# Patient Record
Sex: FEMALE | Race: WHITE | NOT HISPANIC OR LATINO | Employment: UNEMPLOYED | URBAN - METROPOLITAN AREA
[De-identification: names, ages, dates, MRNs, and addresses within clinical notes are randomized per-mention and may not be internally consistent; named-entity substitution may affect disease eponyms.]

---

## 2019-12-11 ENCOUNTER — TRANSCRIBE ORDERS (OUTPATIENT)
Dept: NUTRITION | Facility: HOSPITAL | Age: 39
End: 2019-12-11

## 2019-12-11 DIAGNOSIS — K58.0 IRRITABLE BOWEL SYNDROME WITH DIARRHEA: Primary | ICD-10-CM

## 2020-01-15 ENCOUNTER — APPOINTMENT (OUTPATIENT)
Dept: NUTRITION | Facility: HOSPITAL | Age: 40
End: 2020-01-15

## 2020-08-04 ENCOUNTER — TELEMEDICINE (OUTPATIENT)
Dept: SLEEP MEDICINE | Facility: HOSPITAL | Age: 40
End: 2020-08-04

## 2020-08-04 DIAGNOSIS — G47.33 OBSTRUCTIVE SLEEP APNEA, ADULT: ICD-10-CM

## 2020-08-04 DIAGNOSIS — E66.01 MORBID (SEVERE) OBESITY DUE TO EXCESS CALORIES (HCC): ICD-10-CM

## 2020-08-04 DIAGNOSIS — R56.9 NOCTURNAL SEIZURES (HCC): ICD-10-CM

## 2020-08-04 DIAGNOSIS — R06.83 SNORING: Primary | ICD-10-CM

## 2020-08-04 DIAGNOSIS — F51.04 PSYCHOPHYSIOLOGICAL INSOMNIA: ICD-10-CM

## 2020-08-04 PROCEDURE — 99203 OFFICE O/P NEW LOW 30 MIN: CPT | Performed by: INTERNAL MEDICINE

## 2020-08-04 RX ORDER — LOPERAMIDE HYDROCHLORIDE 2 MG/1
TABLET ORAL
COMMUNITY
End: 2022-09-26

## 2020-08-04 RX ORDER — ALBUTEROL SULFATE 2.5 MG/3ML
2.5 SOLUTION RESPIRATORY (INHALATION) EVERY 6 HOURS PRN
COMMUNITY
End: 2022-09-26

## 2020-08-04 RX ORDER — METHOCARBAMOL 500 MG/1
TABLET, FILM COATED ORAL
COMMUNITY
End: 2022-09-26

## 2020-08-04 RX ORDER — CYCLOBENZAPRINE HCL 10 MG
10 TABLET ORAL 3 TIMES DAILY PRN
COMMUNITY

## 2020-08-04 RX ORDER — ALBUTEROL SULFATE 90 UG/1
AEROSOL, METERED RESPIRATORY (INHALATION)
COMMUNITY

## 2020-08-04 RX ORDER — DULOXETIN HYDROCHLORIDE 30 MG/1
CAPSULE, DELAYED RELEASE ORAL
COMMUNITY
End: 2022-09-26

## 2020-08-04 RX ORDER — FLUOXETINE 10 MG/1
TABLET, FILM COATED ORAL
COMMUNITY
End: 2022-09-26

## 2020-08-04 RX ORDER — NAPROXEN 500 MG/1
TABLET ORAL
COMMUNITY

## 2020-08-04 RX ORDER — PRAZOSIN HYDROCHLORIDE 5 MG/1
CAPSULE ORAL
COMMUNITY
End: 2022-09-26

## 2020-08-04 RX ORDER — PREDNISONE 20 MG/1
TABLET ORAL
COMMUNITY
End: 2022-09-26

## 2020-08-04 RX ORDER — ACETAMINOPHEN 160 MG
TABLET,DISINTEGRATING ORAL
COMMUNITY

## 2020-08-04 RX ORDER — HYDROXYZINE PAMOATE 25 MG/1
25 CAPSULE ORAL 3 TIMES DAILY PRN
COMMUNITY

## 2020-08-04 RX ORDER — IPRATROPIUM BROMIDE 21 UG/1
SPRAY, METERED NASAL
COMMUNITY

## 2020-08-04 RX ORDER — MONTELUKAST SODIUM 10 MG/1
TABLET ORAL
COMMUNITY
End: 2022-09-26

## 2020-08-04 RX ORDER — DULOXETIN HYDROCHLORIDE 60 MG/1
CAPSULE, DELAYED RELEASE ORAL
COMMUNITY
End: 2022-09-26

## 2020-08-04 RX ORDER — DICYCLOMINE HYDROCHLORIDE 10 MG/1
CAPSULE ORAL
COMMUNITY
End: 2022-09-26

## 2020-08-04 RX ORDER — FLUTICASONE PROPIONATE 50 MCG
SPRAY, SUSPENSION (ML) NASAL AS NEEDED
COMMUNITY
End: 2022-09-26

## 2020-08-04 RX ORDER — PHENOL 1.4 %
AEROSOL, SPRAY (ML) MUCOUS MEMBRANE NIGHTLY
COMMUNITY

## 2020-08-04 RX ORDER — LEVETIRACETAM 750 MG/1
750 TABLET ORAL 2 TIMES DAILY
COMMUNITY

## 2020-08-04 NOTE — PATIENT INSTRUCTIONS
Insomnia  Insomnia is a sleep disorder that makes it difficult to fall asleep or stay asleep. Insomnia can cause fatigue, low energy, difficulty concentrating, mood swings, and poor performance at work or school.  There are three different ways to classify insomnia:  · Difficulty falling asleep.  · Difficulty staying asleep.  · Waking up too early in the morning.  Any type of insomnia can be long-term (chronic) or short-term (acute). Both are common. Short-term insomnia usually lasts for three months or less. Chronic insomnia occurs at least three times a week for longer than three months.  What are the causes?  Insomnia may be caused by another condition, situation, or substance, such as:  · Anxiety.  · Certain medicines.  · Gastroesophageal reflux disease (GERD) or other gastrointestinal conditions.  · Asthma or other breathing conditions.  · Restless legs syndrome, sleep apnea, or other sleep disorders.  · Chronic pain.  · Menopause.  · Stroke.  · Abuse of alcohol, tobacco, or illegal drugs.  · Mental health conditions, such as depression.  · Caffeine.  · Neurological disorders, such as Alzheimer's disease.  · An overactive thyroid (hyperthyroidism).  Sometimes, the cause of insomnia may not be known.  What increases the risk?  Risk factors for insomnia include:  · Gender. Women are affected more often than men.  · Age. Insomnia is more common as you get older.  · Stress.  · Lack of exercise.  · Irregular work schedule or working night shifts.  · Traveling between different time zones.  · Certain medical and mental health conditions.  What are the signs or symptoms?  If you have insomnia, the main symptom is having trouble falling asleep or having trouble staying asleep. This may lead to other symptoms, such as:  · Feeling fatigued or having low energy.  · Feeling nervous about going to sleep.  · Not feeling rested in the morning.  · Having trouble concentrating.  · Feeling irritable, anxious, or depressed.  How  is this diagnosed?  This condition may be diagnosed based on:  · Your symptoms and medical history. Your health care provider may ask about:  ? Your sleep habits.  ? Any medical conditions you have.  ? Your mental health.  · A physical exam.  How is this treated?  Treatment for insomnia depends on the cause. Treatment may focus on treating an underlying condition that is causing insomnia. Treatment may also include:  · Medicines to help you sleep.  · Counseling or therapy.  · Lifestyle adjustments to help you sleep better.  Follow these instructions at home:  Eating and drinking    · Limit or avoid alcohol, caffeinated beverages, and cigarettes, especially close to bedtime. These can disrupt your sleep.  · Do not eat a large meal or eat spicy foods right before bedtime. This can lead to digestive discomfort that can make it hard for you to sleep.  Sleep habits    · Keep a sleep diary to help you and your health care provider figure out what could be causing your insomnia. Write down:  ? When you sleep.  ? When you wake up during the night.  ? How well you sleep.  ? How rested you feel the next day.  ? Any side effects of medicines you are taking.  ? What you eat and drink.  · Make your bedroom a dark, comfortable place where it is easy to fall asleep.  ? Put up shades or blackout curtains to block light from outside.  ? Use a white noise machine to block noise.  ? Keep the temperature cool.  · Limit screen use before bedtime. This includes:  ? Watching TV.  ? Using your smartphone, tablet, or computer.  · Stick to a routine that includes going to bed and waking up at the same times every day and night. This can help you fall asleep faster. Consider making a quiet activity, such as reading, part of your nighttime routine.  · Try to avoid taking naps during the day so that you sleep better at night.  · Get out of bed if you are still awake after 15 minutes of trying to sleep. Keep the lights down, but try reading or  doing a quiet activity. When you feel sleepy, go back to bed.  General instructions  · Take over-the-counter and prescription medicines only as told by your health care provider.  · Exercise regularly, as told by your health care provider. Avoid exercise starting several hours before bedtime.  · Use relaxation techniques to manage stress. Ask your health care provider to suggest some techniques that may work well for you. These may include:  ? Breathing exercises.  ? Routines to release muscle tension.  ? Visualizing peaceful scenes.  · Make sure that you drive carefully. Avoid driving if you feel very sleepy.  · Keep all follow-up visits as told by your health care provider. This is important.  Contact a health care provider if:  · You are tired throughout the day.  · You have trouble in your daily routine due to sleepiness.  · You continue to have sleep problems, or your sleep problems get worse.  Get help right away if:  · You have serious thoughts about hurting yourself or someone else.  If you ever feel like you may hurt yourself or others, or have thoughts about taking your own life, get help right away. You can go to your nearest emergency department or call:  · Your local emergency services (911 in the [place]  · A suicide crisis helpline, such as the National Suicide Prevention Lifeline at 1-715.819.1067. This is open 24 hours a day.  Summary  · Insomnia is a sleep disorder that makes it difficult to fall asleep or stay asleep.  · Insomnia can be long-term (chronic) or short-term (acute).  · Treatment for insomnia depends on the cause. Treatment may focus on treating an underlying condition that is causing insomnia.  · Keep a sleep diary to help you and your health care provider figure out what could be causing your insomnia.  This information is not intended to replace advice given to you by your health care provider. Make sure you discuss any questions you have with your health care provider.  Document  Released: 12/15/2001 Document Revised: 11/30/2018 Document Reviewed: 09/27/2018  [employer] Patient Education © 2020 [employer]  Sleep Apnea  Sleep apnea is a condition in which breathing pauses or becomes shallow during sleep. Episodes of sleep apnea usually last 10 seconds or longer, and they may occur as many as 20 times an hour. Sleep apnea disrupts your sleep and keeps your body from getting the rest that it needs. This condition can increase your risk of certain health problems, including:  · Heart attack.  · Stroke.  · Obesity.  · Diabetes.  · Heart failure.  · Irregular heartbeat.  What are the causes?  There are three kinds of sleep apnea:  · Obstructive sleep apnea. This kind is caused by a blocked or collapsed airway.  · Central sleep apnea. This kind happens when the part of the brain that controls breathing does not send the correct signals to the muscles that control breathing.  · Mixed sleep apnea. This is a combination of obstructive and central sleep apnea.  The most common cause of this condition is a collapsed or blocked airway. An airway can collapse or become blocked if:  · Your throat muscles are abnormally relaxed.  · Your tongue and tonsils are larger than normal.  · You are overweight.  · Your airway is smaller than normal.  What increases the risk?  You are more likely to develop this condition if you:  · Are overweight.  · Smoke.  · Have a smaller than normal airway.  · Are elderly.  · Are male.  · Drink alcohol.  · Take sedatives or tranquilizers.  · Have a family history of sleep apnea.  What are the signs or symptoms?  Symptoms of this condition include:  · Trouble staying asleep.  · Daytime sleepiness and tiredness.  · Irritability.  · Loud snoring.  · Morning headaches.  · Trouble concentrating.  · Forgetfulness.  · Decreased interest in sex.  · Unexplained sleepiness.  · Mood swings.  · Personality changes.  · Feelings of depression.  · Waking up often during the night to  urinate.  · Dry mouth.  · Sore throat.  How is this diagnosed?  This condition may be diagnosed with:  · A medical history.  · A physical exam.  · A series of tests that are done while you are sleeping (sleep study). These tests are usually done in a sleep lab, but they may also be done at home.  How is this treated?  Treatment for this condition aims to restore normal breathing and to ease symptoms during sleep. It may involve managing health issues that can affect breathing, such as high blood pressure or obesity. Treatment may include:  · Sleeping on your side.  · Using a decongestant if you have nasal congestion.  · Avoiding the use of depressants, including alcohol, sedatives, and narcotics.  · Losing weight if you are overweight.  · Making changes to your diet.  · Quitting smoking.  · Using a device to open your airway while you sleep, such as:  ? An oral appliance. This is a custom-made mouthpiece that shifts your lower jaw forward.  ? A continuous positive airway pressure (CPAP) device. This device blows air through a mask when you breathe out (exhale).  ? A nasal expiratory positive airway pressure (EPAP) device. This device has valves that you put into each nostril.  ? A bi-level positive airway pressure (BPAP) device. This device blows air through a mask when you breathe in (inhale) and breathe out (exhale).  · Having surgery if other treatments do not work. During surgery, excess tissue is removed to create a wider airway.  It is important to get treatment for sleep apnea. Without treatment, this condition can lead to:  · High blood pressure.  · Coronary artery disease.  · In men, an inability to achieve or maintain an erection (impotence).  · Reduced thinking abilities.  Follow these instructions at home:  Lifestyle  · Make any lifestyle changes that your health care provider recommends.  · Eat a healthy, well-balanced diet.  · Take steps to lose weight if you are overweight.  · Avoid using depressants,  including alcohol, sedatives, and narcotics.  · Do not use any products that contain nicotine or tobacco, such as cigarettes, e-cigarettes, and chewing tobacco. If you need help quitting, ask your health care provider.  General instructions  · Take over-the-counter and prescription medicines only as told by your health care provider.  · If you were given a device to open your airway while you sleep, use it only as told by your health care provider.  · If you are having surgery, make sure to tell your health care provider you have sleep apnea. You may need to bring your device with you.  · Keep all follow-up visits as told by your health care provider. This is important.  Contact a health care provider if:  · The device that you received to open your airway during sleep is uncomfortable or does not seem to be working.  · Your symptoms do not improve.  · Your symptoms get worse.  Get help right away if:  · You develop:  ? Chest pain.  ? Shortness of breath.  ? Discomfort in your back, arms, or stomach.  · You have:  ? Trouble speaking.  ? Weakness on one side of your body.  ? Drooping in your face.  These symptoms may represent a serious problem that is an emergency. Do not wait to see if the symptoms will go away. Get medical help right away. Call your local emergency services (911 in the [place] Do not drive yourself to the hospital.  Summary  · Sleep apnea is a condition in which breathing pauses or becomes shallow during sleep.  · The most common cause is a collapsed or blocked airway.  · The goal of treatment is to restore normal breathing and to ease symptoms during sleep.  This information is not intended to replace advice given to you by your health care provider. Make sure you discuss any questions you have with your health care provider.  Document Released: 12/08/2003 Document Revised: 10/04/2019 Document Reviewed: 08/13/2019  [employer] Patient Education © 2020 [employer]

## 2020-08-05 NOTE — PROGRESS NOTES
Subjective   Merle Muir is a 40 y.o. female is being seen for consultation today at the request of Dr. Whalen for evaluation of difficulties getting to sleep and staying asleep.  You have chosen to receive care through a telehealth visit.  Do you consent to use a video/audio connection for your medical care today? Yes      History of Present Illness  Patient complains of not sleeping well.  She says she awakens frequently and has problems getting to sleep.  She says she has had snoring noted all of her life.  She reportedly had nasal surgery when she was younger.  She has occasional apneas noted for the past 3 years.  She says she has had nocturnal seizures noted throughout her life.  She is on medications but said her last nocturnal seizure was a couple nights ago.  She has a neurostimulator implant to help control her seizures.      She says she is not rested on arising in the morning.  She denies morning headaches.  She will fall asleep if sitting quietly during the day.  She denies taking naps.  She has a history of occasional heartburn.  She thinks she has broken her nose in the past.    She has bed between 10 PM and 11 PM.  She may take 2 to 3 hours to go to sleep.  She says she awakens almost every hour when she is in bed.  She thinks she only gets 2 to 4 hours of sleep per night.  She denies any dysfunction or diabetes.  She has been told she had polycystic ovary syndrome.      She has seasonal environmental allergies and says she is allergic to Sudafed and aspirin      Current Outpatient Medications:   •  Acetaminophen 500 MG capsule, , Disp: , Rfl:   •  albuterol (PROVENTIL) (2.5 MG/3ML) 0.083% nebulizer solution, albuterol sulfate 2.5 mg/3 mL (0.083 %) solution for nebulization  One vial Every four to six hours, Disp: , Rfl:   •  albuterol sulfate  (90 Base) MCG/ACT inhaler, albuterol sulfate HFA 90 mcg/actuation aerosol inhaler, Disp: , Rfl:   •  Cholecalciferol (VITAMIN D3) 50 MCG (2000  UT) capsule, Vitamin D3 50 mcg (2,000 unit) capsule  2 capsules daily OTC, Disp: , Rfl:   •  cyclobenzaprine (FLEXERIL) 10 MG tablet, cyclobenzaprine 10 mg tablet, Disp: , Rfl:   •  dicyclomine (BENTYL) 10 MG capsule, dicyclomine 10 mg capsule, Disp: , Rfl:   •  DULoxetine (Cymbalta) 30 MG capsule, duloxetine 30 mg capsule,delayed release, Disp: , Rfl:   •  DULoxetine (Cymbalta) 60 MG capsule, duloxetine 60 mg capsule,delayed release, Disp: , Rfl:   •  FLUoxetine (PROzac) 10 MG tablet, fluoxetine 10 mg capsule, Disp: , Rfl:   •  fluticasone (FLONASE) 50 MCG/ACT nasal spray, fluticasone propionate 50 mcg/actuation nasal spray,suspension, Disp: , Rfl:   •  Fluticasone Furoate-Vilanterol (Breo Ellipta) 100-25 MCG/INH inhaler, Every 12 (Twelve) Hours., Disp: , Rfl:   •  hydrOXYzine pamoate (VISTARIL) 25 MG capsule, hydroxyzine pamoate 25 mg capsule, Disp: , Rfl:   •  ipratropium (ATROVENT) 0.03 % nasal spray, ipratropium bromide 0.03 % nasal spray, Disp: , Rfl:   •  levETIRAcetam (KEPPRA) 750 MG tablet, levetiracetam 750 mg tablet, Disp: , Rfl:   •  loperamide (IMODIUM A-D) 2 MG tablet, Anti-Diarrheal (loperamide) 2 mg tablet, Disp: , Rfl:   •  Melatonin 10 MG tablet, , Disp: , Rfl:   •  methocarbamol (ROBAXIN) 500 MG tablet, methocarbamol 500 mg tablet, Disp: , Rfl:   •  montelukast (SINGULAIR) 10 MG tablet, montelukast 10 mg tablet, Disp: , Rfl:   •  naproxen (NAPROSYN) 500 MG tablet, naproxen 500 mg tablet, Disp: , Rfl:   •  norethindrone (AYGESTIN) 5 MG tablet, norethindrone acetate 5 mg tablet, Disp: , Rfl:   •  prazosin (MINIPRESS) 5 MG capsule, prazosin 5 mg capsule, Disp: , Rfl:   •  predniSONE (DELTASONE) 20 MG tablet, prednisone 20 mg tablet, Disp: , Rfl:     Social History    Tobacco Use      Smoking status: Without       Social History     Substance and Sexual Activity   Alcohol Use  she estimates having only 1-2 drinks per year       Caffeine: She has 1 cola per day and may occasionally have a  tea    History reviewed.  She has a history she says of arthritis and chronic pain.  She has history of depression anxiety and panic disorders as well as her seizure disorder.  She has a history of asthma and fibromyalgia    History reviewed.  She had wisdom teeth extraction, tonsillectomy, sinus surgery, , nasal repair, exploratory lap, cholecystectomy, and neurostimulator.    History reviewed.  She was adopted but thinks her birth mother had a history of breast cancer    The following portions of the patient's history were reviewed and updated as appropriate: allergies, current medications, past family history, past medical history, past social history, past surgical history and problem list.    Review of Systems   Constitutional: Positive for fatigue.   Eyes: Negative.    Respiratory: Positive for shortness of breath and wheezing.    Cardiovascular: Negative.    Gastrointestinal: Positive for diarrhea.   Endocrine: Negative.    Genitourinary: Positive for frequency.   Musculoskeletal: Positive for arthralgias, back pain, joint swelling and myalgias.   Skin: Negative.    Allergic/Immunologic: Positive for environmental allergies.   Neurological: Positive for seizures and headaches.   Hematological: Negative.    Psychiatric/Behavioral: Positive for dysphoric mood. The patient is nervous/anxious.    Chilton score is 13/24    Objective     There were no vitals taken for this visit.     Physical Exam  Patient appears awake and alert.  She does not appear to be in acute respiratory distress.  She does appear to be anxious and is pacing constantly.  Stated weight is 230 pounds.  Her height 5 feet 2 inches.  Her body mass index is 40.5.  She has Mallampati class IV anatomy.    Assessment/Plan   Diagnoses and all orders for this visit:    Snoring  -     Polysomnography 4 or More Parameters; Future    Obstructive sleep apnea, adult  -     Polysomnography 4 or More Parameters; Future    Psychophysiological  insomnia    Nocturnal seizures (CMS/HCC)  -     Polysomnography 4 or More Parameters; Future    Morbid (severe) obesity due to excess calories (CMS/HCC)    Patient has a history of snoring nonrestorative sleep.  I think she gives a very good story for obstructive sleep apnea.  She also has disturbed sleep with what she describes as nocturnal seizures.  We will plan to proceed to polysomnogram with complete seizure montage.  We have discussed possible therapies for sleep apnea including CPAP, weight control, oral appliance, and surgery.  We have also discussed the long-term consequences of untreated obstructive sleep apnea.  She is encouraged to lose weight.  She is encouraged to avoid alcohol and sedatives close to bedtime.  She is encouraged to practice lateral position sleep.    Patient also seems to have significant psychophysiologic insomnia.  We have discussed measures to improve sleep hygiene.  She is try to get a regular rise time and a regular bedtime.  She is try to get light exposure early in the day.  She is to try to exercise on a regular basis.  She is tried to establish a bedtime ritual and get off screens at least an hour before she wishes to go to sleep.  This may need to be addressed further once we have her sleep apnea treated.    Total time: 30 minutes exclusive of procedures.         Javier Sharma MD [facility]  Sleep Medicine  Pulmonary and Critical Care Medicine

## 2022-09-19 ENCOUNTER — APPOINTMENT (OUTPATIENT)
Dept: CT IMAGING | Facility: HOSPITAL | Age: 42
End: 2022-09-19

## 2022-09-19 ENCOUNTER — HOSPITAL ENCOUNTER (EMERGENCY)
Facility: HOSPITAL | Age: 42
Discharge: HOME | End: 2022-09-20
Attending: EMERGENCY MEDICINE | Admitting: EMERGENCY MEDICINE

## 2022-09-19 DIAGNOSIS — K59.00 CONSTIPATION, UNSPECIFIED CONSTIPATION TYPE: ICD-10-CM

## 2022-09-19 DIAGNOSIS — R10.31 ACUTE BILATERAL LOWER ABDOMINAL PAIN: Primary | ICD-10-CM

## 2022-09-19 DIAGNOSIS — K62.5 RECTAL BLEEDING: ICD-10-CM

## 2022-09-19 DIAGNOSIS — R10.32 ACUTE BILATERAL LOWER ABDOMINAL PAIN: Primary | ICD-10-CM

## 2022-09-19 LAB
ABO GROUP BLD: NORMAL
ALBUMIN SERPL-MCNC: 4.2 G/DL (ref 3.5–5.2)
ALBUMIN/GLOB SERPL: 1.1 G/DL
ALP SERPL-CCNC: 113 U/L (ref 39–117)
ALT SERPL W P-5'-P-CCNC: 44 U/L (ref 1–33)
ANION GAP SERPL CALCULATED.3IONS-SCNC: 11 MMOL/L (ref 5–15)
AST SERPL-CCNC: 31 U/L (ref 1–32)
BASOPHILS # BLD AUTO: 0.05 10*3/MM3 (ref 0–0.2)
BASOPHILS NFR BLD AUTO: 0.5 % (ref 0–1.5)
BILIRUB SERPL-MCNC: 0.4 MG/DL (ref 0–1.2)
BLD GP AB SCN SERPL QL: NEGATIVE
BUN SERPL-MCNC: 12 MG/DL (ref 6–20)
BUN/CREAT SERPL: 17.9 (ref 7–25)
CALCIUM SPEC-SCNC: 9.5 MG/DL (ref 8.6–10.5)
CHLORIDE SERPL-SCNC: 102 MMOL/L (ref 98–107)
CO2 SERPL-SCNC: 27 MMOL/L (ref 22–29)
CREAT SERPL-MCNC: 0.67 MG/DL (ref 0.57–1)
DEPRECATED RDW RBC AUTO: 42.5 FL (ref 37–54)
EGFRCR SERPLBLD CKD-EPI 2021: 113.5 ML/MIN/1.73
EOSINOPHIL # BLD AUTO: 0.16 10*3/MM3 (ref 0–0.4)
EOSINOPHIL NFR BLD AUTO: 1.6 % (ref 0.3–6.2)
ERYTHROCYTE [DISTWIDTH] IN BLOOD BY AUTOMATED COUNT: 14.2 % (ref 12.3–15.4)
GLOBULIN UR ELPH-MCNC: 3.9 GM/DL
GLUCOSE SERPL-MCNC: 93 MG/DL (ref 65–99)
HCG INTACT+B SERPL-ACNC: <0.1 MIU/ML
HCT VFR BLD AUTO: 43.6 % (ref 34–46.6)
HGB BLD-MCNC: 13.9 G/DL (ref 12–15.9)
HOLD SPECIMEN: NORMAL
HOLD SPECIMEN: NORMAL
IMM GRANULOCYTES # BLD AUTO: 0.03 10*3/MM3 (ref 0–0.05)
IMM GRANULOCYTES NFR BLD AUTO: 0.3 % (ref 0–0.5)
LIPASE SERPL-CCNC: 33 U/L (ref 13–60)
LYMPHOCYTES # BLD AUTO: 3.58 10*3/MM3 (ref 0.7–3.1)
LYMPHOCYTES NFR BLD AUTO: 36.1 % (ref 19.6–45.3)
MCH RBC QN AUTO: 26.4 PG (ref 26.6–33)
MCHC RBC AUTO-ENTMCNC: 31.9 G/DL (ref 31.5–35.7)
MCV RBC AUTO: 82.7 FL (ref 79–97)
MONOCYTES # BLD AUTO: 0.73 10*3/MM3 (ref 0.1–0.9)
MONOCYTES NFR BLD AUTO: 7.4 % (ref 5–12)
NEUTROPHILS NFR BLD AUTO: 5.38 10*3/MM3 (ref 1.7–7)
NEUTROPHILS NFR BLD AUTO: 54.1 % (ref 42.7–76)
NRBC BLD AUTO-RTO: 0 /100 WBC (ref 0–0.2)
PLATELET # BLD AUTO: 337 10*3/MM3 (ref 140–450)
PMV BLD AUTO: 11.2 FL (ref 6–12)
POTASSIUM SERPL-SCNC: 4.3 MMOL/L (ref 3.5–5.2)
PROT SERPL-MCNC: 8.1 G/DL (ref 6–8.5)
RBC # BLD AUTO: 5.27 10*6/MM3 (ref 3.77–5.28)
RH BLD: POSITIVE
SODIUM SERPL-SCNC: 140 MMOL/L (ref 136–145)
T&S EXPIRATION DATE: NORMAL
WBC NRBC COR # BLD: 9.93 10*3/MM3 (ref 3.4–10.8)
WHOLE BLOOD HOLD COAG: NORMAL
WHOLE BLOOD HOLD SPECIMEN: NORMAL

## 2022-09-19 PROCEDURE — 84702 CHORIONIC GONADOTROPIN TEST: CPT | Performed by: EMERGENCY MEDICINE

## 2022-09-19 PROCEDURE — 86850 RBC ANTIBODY SCREEN: CPT

## 2022-09-19 PROCEDURE — 25010000002 IOPAMIDOL 61 % SOLUTION: Performed by: EMERGENCY MEDICINE

## 2022-09-19 PROCEDURE — 86901 BLOOD TYPING SEROLOGIC RH(D): CPT

## 2022-09-19 PROCEDURE — 86900 BLOOD TYPING SEROLOGIC ABO: CPT

## 2022-09-19 PROCEDURE — 99283 EMERGENCY DEPT VISIT LOW MDM: CPT

## 2022-09-19 PROCEDURE — 80053 COMPREHEN METABOLIC PANEL: CPT

## 2022-09-19 PROCEDURE — 83690 ASSAY OF LIPASE: CPT | Performed by: EMERGENCY MEDICINE

## 2022-09-19 PROCEDURE — 85025 COMPLETE CBC W/AUTO DIFF WBC: CPT

## 2022-09-19 PROCEDURE — 74177 CT ABD & PELVIS W/CONTRAST: CPT

## 2022-09-19 RX ORDER — SODIUM CHLORIDE 0.9 % (FLUSH) 0.9 %
10 SYRINGE (ML) INJECTION AS NEEDED
Status: DISCONTINUED | OUTPATIENT
Start: 2022-09-19 | End: 2022-09-20 | Stop reason: HOSPADM

## 2022-09-19 RX ADMIN — IOPAMIDOL 90 ML: 612 INJECTION, SOLUTION INTRAVENOUS at 23:20

## 2022-09-20 VITALS
RESPIRATION RATE: 16 BRPM | WEIGHT: 230 LBS | TEMPERATURE: 98.3 F | OXYGEN SATURATION: 97 % | HEART RATE: 83 BPM | BODY MASS INDEX: 42.33 KG/M2 | DIASTOLIC BLOOD PRESSURE: 47 MMHG | HEIGHT: 62 IN | SYSTOLIC BLOOD PRESSURE: 106 MMHG

## 2022-09-20 LAB — HOLD SPECIMEN: NORMAL

## 2022-09-20 NOTE — ED PROVIDER NOTES
[facility]    EMERGENCY DEPARTMENT ENCOUNTER      Pt Name: Merle Muir  MRN: [id number]  YOB: 1980  Date of evaluation: 9/19/2022  Provider: Vikas Govea MD    CHIEF COMPLAINT       Chief Complaint   Patient presents with   • Abdominal Pain   • Diarrhea   • Constipation   • Black or Bloody Stool         HISTORY OF PRESENT ILLNESS  (Location/Symptom, Timing/Onset, Context/Setting, Quality, Duration, Modifying Factors, Severity.)   Merle Muir is a 42 y.o. female who presents to the emergency department with moderate severity intermittent watery diarrhea and constipation over the course the past 3 days.  She has had occasional bright red blood streaked on her stool and on the tissue paper but no gross blood in the toilet and she has had no vomiting.  She describes moderate cramping left lower quadrant abdominal pain intermittently as well that is not currently ongoing.  She does not take any anticoagulant medications.  She denies any fever, chills, or urinary symptoms associated with this.      Nursing notes were reviewed.    REVIEW OF SYSTEMS    (2-9 systems for level 4, 10 or more for level 5)   ROS:  General:  No fevers, no chills, no weakness  Cardiovascular:  No chest pain, no palpitations  Respiratory:  No shortness of breath, no cough, no wheezing  Gastrointestinal: Diarrhea  Musculoskeletal:  No muscle pain, no joint pain  Skin:  No rash  Neurologic:  No speech problems, no headache, no extremity numbness, no extremity tingling, no extremity weakness  Psychiatric:  No anxiety  Genitourinary:  No dysuria, no hematuria    Except as noted above the remainder of the review of systems was reviewed and negative.       PAST MEDICAL HISTORY   None    SURGICAL HISTORY     No past surgical history on file.      CURRENT MEDICATIONS     No current facility-administered medications for this encounter.    Current Outpatient Medications:   •  Acetaminophen 500 MG capsule, , Disp: , Rfl:   •   albuterol (PROVENTIL) (2.5 MG/3ML) 0.083% nebulizer solution, albuterol sulfate 2.5 mg/3 mL (0.083 %) solution for nebulization  One vial Every four to six hours, Disp: , Rfl:   •  albuterol sulfate  (90 Base) MCG/ACT inhaler, albuterol sulfate HFA 90 mcg/actuation aerosol inhaler, Disp: , Rfl:   •  Cholecalciferol (VITAMIN D3) 50 MCG (2000 UT) capsule, Vitamin D3 50 mcg (2,000 unit) capsule  2 capsules daily OTC, Disp: , Rfl:   •  cyclobenzaprine (FLEXERIL) 10 MG tablet, cyclobenzaprine 10 mg tablet, Disp: , Rfl:   •  dicyclomine (BENTYL) 10 MG capsule, dicyclomine 10 mg capsule, Disp: , Rfl:   •  DULoxetine (CYMBALTA) 30 MG capsule, duloxetine 30 mg capsule,delayed release, Disp: , Rfl:   •  DULoxetine (CYMBALTA) 60 MG capsule, duloxetine 60 mg capsule,delayed release, Disp: , Rfl:   •  FLUoxetine (PROzac) 10 MG tablet, fluoxetine 10 mg capsule, Disp: , Rfl:   •  fluticasone (FLONASE) 50 MCG/ACT nasal spray, fluticasone propionate 50 mcg/actuation nasal spray,suspension, Disp: , Rfl:   •  Fluticasone Furoate-Vilanterol (Breo Ellipta) 100-25 MCG/INH inhaler, Every 12 (Twelve) Hours., Disp: , Rfl:   •  hydrOXYzine pamoate (VISTARIL) 25 MG capsule, hydroxyzine pamoate 25 mg capsule, Disp: , Rfl:   •  ipratropium (ATROVENT) 0.03 % nasal spray, ipratropium bromide 0.03 % nasal spray, Disp: , Rfl:   •  levETIRAcetam (KEPPRA) 750 MG tablet, levetiracetam 750 mg tablet, Disp: , Rfl:   •  loperamide (IMODIUM A-D) 2 MG tablet, Anti-Diarrheal (loperamide) 2 mg tablet, Disp: , Rfl:   •  Melatonin 10 MG tablet, , Disp: , Rfl:   •  methocarbamol (ROBAXIN) 500 MG tablet, methocarbamol 500 mg tablet, Disp: , Rfl:   •  montelukast (SINGULAIR) 10 MG tablet, montelukast 10 mg tablet, Disp: , Rfl:   •  naproxen (NAPROSYN) 500 MG tablet, naproxen 500 mg tablet, Disp: , Rfl:   •  norethindrone (AYGESTIN) 5 MG tablet, norethindrone acetate 5 mg tablet, Disp: , Rfl:   •  prazosin (MINIPRESS) 5 MG capsule, prazosin 5 mg capsule,  "Disp: , Rfl:   •  predniSONE (DELTASONE) 20 MG tablet, prednisone 20 mg tablet, Disp: , Rfl:     ALLERGIES     Asa [aspirin], Minocycline, Rifampin, and Sudafed [pseudoephedrine hcl]    FAMILY HISTORY     No family history on file.       SOCIAL HISTORY       Social History     Socioeconomic History   • Marital status: Single         PHYSICAL EXAM    (up to 7 for level 4, 8 or more for level 5)     Vitals:    09/19/22 1955 09/19/22 2339 09/19/22 2359   BP: 129/73 116/65 106/47   Pulse: 83     Resp: 16     Temp: 98.3 °F (36.8 °C)     TempSrc: Oral     SpO2: 98% (!) 88% 97%   Weight: 104 kg (230 lb)     Height: 157.5 cm (62\")         Physical Exam  General: Awake, alert, no acute distress.  HEENT: Conjunctivae normal.  Neck: Trachea midline.  Cardiac: Heart regular rate, rhythm, no murmurs, rubs, or gallops  Lungs: Lungs are clear to auscultation, there is no wheezing, rhonchi, or rales. There is no use of accessory muscles.  Chest wall: There is no tenderness to palpation over the chest wall or over ribs  Abdomen: Abdomen is soft, nontender, nondistended. There are no firm or pulsatile masses, no rebound rigidity or guarding.   Musculoskeletal: No deformity.  Neuro: Alert and oriented x 4.  Dermatology: Skin is warm and dry  Psych: Mentation is grossly normal, cognition is grossly normal. Affect is appropriate.        DIAGNOSTIC RESULTS     EKG: All EKGs are interpreted by the Emergency Department Physician who either signs or Co-signs this chart in the absence of a cardiologist.    No orders to display       RADIOLOGY:   Non-plain film images such as CT, Ultrasound and MRI are read by the radiologist. Plain radiographic images are visualized and preliminarily interpreted by the emergency physician with the below findings:      [x] Radiologist's Report Reviewed:  CT Abdomen Pelvis With Contrast   Final Result      2 left adnexal cysts measuring up to 4.6 and 3.9 cm in diameter respectively.      No additional acute " abdominal or pelvic process seen.      Scattered colonic diverticula without visible acute diverticulitis.      Possible hepatic steatosis.      Electronically signed by:  Lior Huntley M.D.     9/19/2022 9:50 PM Mountain Time            ED BEDSIDE ULTRASOUND:   Performed by ED Physician - none    LABS:    I have reviewed and interpreted all of the currently available lab results from this visit (if applicable):  Results for orders placed or performed during the hospital encounter of 09/19/22   Comprehensive Metabolic Panel    Specimen: Blood   Result Value Ref Range    Glucose 93 65 - 99 mg/dL    BUN 12 6 - 20 mg/dL    Creatinine 0.67 0.57 - 1.00 mg/dL    Sodium 140 136 - 145 mmol/L    Potassium 4.3 3.5 - 5.2 mmol/L    Chloride 102 98 - 107 mmol/L    CO2 27.0 22.0 - 29.0 mmol/L    Calcium 9.5 8.6 - 10.5 mg/dL    Total Protein 8.1 6.0 - 8.5 g/dL    Albumin 4.20 3.50 - 5.20 g/dL    ALT (SGPT) 44 (H) 1 - 33 U/L    AST (SGOT) 31 1 - 32 U/L    Alkaline Phosphatase 113 39 - 117 U/L    Total Bilirubin 0.4 0.0 - 1.2 mg/dL    Globulin 3.9 gm/dL    A/G Ratio 1.1 g/dL    BUN/Creatinine Ratio 17.9 7.0 - 25.0    Anion Gap 11.0 5.0 - 15.0 mmol/L    eGFR 113.5 >60.0 mL/min/1.73   CBC Auto Differential    Specimen: Blood   Result Value Ref Range    WBC 9.93 3.40 - 10.80 10*3/mm3    RBC 5.27 3.77 - 5.28 10*6/mm3    Hemoglobin 13.9 12.0 - 15.9 g/dL    Hematocrit 43.6 34.0 - 46.6 %    MCV 82.7 79.0 - 97.0 fL    MCH 26.4 (L) 26.6 - 33.0 pg    MCHC 31.9 31.5 - 35.7 g/dL    RDW 14.2 12.3 - 15.4 %    RDW-SD 42.5 37.0 - 54.0 fl    MPV 11.2 6.0 - 12.0 fL    Platelets 337 140 - 450 10*3/mm3    Neutrophil % 54.1 42.7 - 76.0 %    Lymphocyte % 36.1 19.6 - 45.3 %    Monocyte % 7.4 5.0 - 12.0 %    Eosinophil % 1.6 0.3 - 6.2 %    Basophil % 0.5 0.0 - 1.5 %    Immature Grans % 0.3 0.0 - 0.5 %    Neutrophils, Absolute 5.38 1.70 - 7.00 10*3/mm3    Lymphocytes, Absolute 3.58 (H) 0.70 - 3.10 10*3/mm3    Monocytes, Absolute 0.73 0.10 - 0.90 10*3/mm3  "   Eosinophils, Absolute 0.16 0.00 - 0.40 10*3/mm3    Basophils, Absolute 0.05 0.00 - 0.20 10*3/mm3    Immature Grans, Absolute 0.03 0.00 - 0.05 10*3/mm3    nRBC 0.0 0.0 - 0.2 /100 WBC   hCG, Quantitative, Pregnancy    Specimen: Blood   Result Value Ref Range    HCG Quantitative <0.10 mIU/mL   Lipase    Specimen: Blood   Result Value Ref Range    Lipase 33 13 - 60 U/L   Type & Screen    Specimen: Blood   Result Value Ref Range    ABO Type O     RH type Positive     Antibody Screen Negative     T&S Expiration Date 9/22/2022 11:59:59 PM    Green Top (Gel)   Result Value Ref Range    Extra Tube Hold for add-ons.    Lavender Top   Result Value Ref Range    Extra Tube hold for add-on    Gold Top - SST   Result Value Ref Range    Extra Tube Hold for add-ons.    Gray Top   Result Value Ref Range    Extra Tube Hold for add-ons.    Light Blue Top   Result Value Ref Range    Extra Tube Hold for add-ons.         All other labs were within normal range or not returned as of this dictation.      EMERGENCY DEPARTMENT COURSE and DIFFERENTIAL DIAGNOSIS/MDM:   Vitals:    Vitals:    09/19/22 1955 09/19/22 2339 09/19/22 2359   BP: 129/73 116/65 106/47   Pulse: 83     Resp: 16     Temp: 98.3 °F (36.8 °C)     TempSrc: Oral     SpO2: 98% (!) 88% 97%   Weight: 104 kg (230 lb)     Height: 157.5 cm (62\")              Patient is very well-appearing and nontoxic throughout the duration of her stay in the emergency department with no ongoing abdominal pain and a benign exam.  She has had mild bleeding streaked in her stool but no gross blood and no hematemesis.  There is no suggestion based on my assessment of significant GI bleeding.  CT scan demonstrates no acute intra-abdominal pathology including evidence of diverticulitis, vascular abnormality, appendicitis, or other acute process.  Labs are unremarkable.  I feel that he is appropriate for discharge home at this time.  Will refer to gastroenterology for further evaluation.    I had a " discussion with the patient/family regarding diagnosis, diagnostic results, treatment plan, and medications.  The patient/family indicated understanding of these instructions.  I spent adequate time at the bedside preceding discharge necessary to personally discuss the aftercare instructions, giving patient education, providing explanations of the results of our evaluations/findings, and my decision making to assure that the patient/family understand the plan of care.  Time was allotted to answer questions at that time and throughout the ED course.  Emphasis was placed on timely follow-up after discharge.  I also discussed the potential for the development of an acute emergent condition requiring further evaluation, admission, or even surgical intervention. I discussed that we found nothing during the visit today indicating the need for further workup, admission, or the presence of an unstable medical condition.  I encouraged the patient to return to the emergency department immediately for ANY concerns, worsening, new complaints, or if symptoms persist and unable to seek follow-up in a timely fashion.  The patient/family expressed understanding and agreement with this plan.  The patient will follow-up with their PCP in 1-2 days for reevaluation.       MEDICATIONS ADMINISTERED IN ED:  Medications   iopamidol (ISOVUE-300) 61 % injection 90 mL (90 mL Intravenous Given 9/19/22 4650)       PROCEDURES:  Procedures    CRITICAL CARE TIME    Total Critical Care time was 0 minutes, excluding separately reportable procedures.   There was a high probability of clinically significant/life threatening deterioration in the patient's condition which required my urgent intervention.      FINAL IMPRESSION      1. Acute bilateral lower abdominal pain    2. Constipation, unspecified constipation type    3. Rectal bleeding          DISPOSITION/PLAN     ED Disposition     ED Disposition   Discharge    Condition   Stable    Comment   --              PATIENT REFERRED TO:  Lorena Whalen MD  [id number]  [place] [id number]  [phone]    Schedule an appointment as soon as possible for a visit in 2 days      [facility] Emergency Department  [address] [facility] [id number] [phone]    If symptoms worsen    Venkata Manzano MD  [address]  [address]  [place] [address]  [phone]    Schedule an appointment as soon as possible for a visit in 2 days        DISCHARGE MEDICATIONS:     Medication List      CONTINUE taking these medications    Acetaminophen 500 MG capsule     * albuterol sulfate  (90 Base) MCG/ACT inhaler  Commonly known as: PROVENTIL HFA;VENTOLIN HFA;PROAIR HFA     * albuterol (2.5 MG/3ML) 0.083% nebulizer solution  Commonly known as: PROVENTIL     Breo Ellipta 100-25 MCG/INH inhaler  Generic drug: Fluticasone Furoate-Vilanterol     cyclobenzaprine 10 MG tablet  Commonly known as: FLEXERIL     dicyclomine 10 MG capsule  Commonly known as: BENTYL     * DULoxetine 60 MG capsule  Commonly known as: CYMBALTA     * DULoxetine 30 MG capsule  Commonly known as: CYMBALTA     FLUoxetine 10 MG tablet  Commonly known as: PROzac     fluticasone 50 MCG/ACT nasal spray  Commonly known as: FLONASE     hydrOXYzine pamoate 25 MG capsule  Commonly known as: VISTARIL     ipratropium 0.03 % nasal spray  Commonly known as: ATROVENT     levETIRAcetam 750 MG tablet  Commonly known as: KEPPRA     loperamide 2 MG tablet  Commonly known as: IMODIUM A-D     Melatonin 10 MG tablet     methocarbamol 500 MG tablet  Commonly known as: ROBAXIN     montelukast 10 MG tablet  Commonly known as: SINGULAIR     naproxen 500 MG tablet  Commonly known as: NAPROSYN     norethindrone 5 MG tablet  Commonly known as: AYGESTIN     prazosin 5 MG capsule  Commonly known as: MINIPRESS     predniSONE 20 MG tablet  Commonly known as: DELTASONE     Vitamin D3 50 MCG (2000 UT) capsule         * This list has 4  medication(s) that are the same as other medications prescribed for you. Read the directions carefully, and ask your doctor or other care provider to review them with you.                    Comment: Please note this report has been produced using speech recognition software.      Vikas Govea MD  Attending Emergency Physician               Vikas Govea MD  09/21/22 0790

## 2022-09-26 ENCOUNTER — TELEPHONE (OUTPATIENT)
Dept: GASTROENTEROLOGY | Facility: CLINIC | Age: 42
End: 2022-09-26

## 2022-09-26 ENCOUNTER — OFFICE VISIT (OUTPATIENT)
Dept: GASTROENTEROLOGY | Facility: CLINIC | Age: 42
End: 2022-09-26

## 2022-09-26 VITALS
SYSTOLIC BLOOD PRESSURE: 140 MMHG | TEMPERATURE: 97.6 F | DIASTOLIC BLOOD PRESSURE: 90 MMHG | OXYGEN SATURATION: 99 % | HEIGHT: 62 IN | HEART RATE: 77 BPM | WEIGHT: 234 LBS | BODY MASS INDEX: 43.06 KG/M2

## 2022-09-26 DIAGNOSIS — K62.5 RECTAL BLEEDING: ICD-10-CM

## 2022-09-26 DIAGNOSIS — K90.89 BILE SALT-INDUCED DIARRHEA: Primary | ICD-10-CM

## 2022-09-26 DIAGNOSIS — R11.0 CHRONIC NAUSEA: ICD-10-CM

## 2022-09-26 DIAGNOSIS — R10.13 DYSPEPSIA: ICD-10-CM

## 2022-09-26 PROCEDURE — 99204 OFFICE O/P NEW MOD 45 MIN: CPT | Performed by: NURSE PRACTITIONER

## 2022-09-26 RX ORDER — NAPROXEN 500 MG/1
500 TABLET ORAL 2 TIMES DAILY WITH MEALS
COMMUNITY

## 2022-09-26 RX ORDER — PREGABALIN 50 MG/1
CAPSULE ORAL
COMMUNITY

## 2022-09-26 RX ORDER — ONDANSETRON 4 MG/1
TABLET, ORALLY DISINTEGRATING ORAL
COMMUNITY
Start: 2022-09-07

## 2022-09-26 RX ORDER — OMEPRAZOLE 20 MG/1
20 CAPSULE, DELAYED RELEASE ORAL DAILY
Qty: 30 CAPSULE | Refills: 5 | Status: SHIPPED | OUTPATIENT
Start: 2022-09-26 | End: 2022-11-15 | Stop reason: SDUPTHER

## 2022-09-26 RX ORDER — SEMAGLUTIDE 1.34 MG/ML
INJECTION, SOLUTION SUBCUTANEOUS
COMMUNITY

## 2022-09-26 RX ORDER — NORETHINDRONE 0.35 MG/1
1 TABLET ORAL DAILY
COMMUNITY
Start: 2022-09-12

## 2022-09-26 RX ORDER — MONTELUKAST SODIUM 4 MG/1
1 TABLET, CHEWABLE ORAL 2 TIMES DAILY
Qty: 60 TABLET | Refills: 2 | Status: SHIPPED | OUTPATIENT
Start: 2022-09-26 | End: 2023-01-11

## 2022-09-26 RX ORDER — CARIPRAZINE 3 MG/1
CAPSULE, GELATIN COATED ORAL
COMMUNITY
Start: 2022-09-11

## 2022-09-26 NOTE — PROGRESS NOTES
New Patient Consultation     Patient Name: Merle Muir  : 1980   MRN: [id number]     Chief Complaint:    Chief Complaint   Patient presents with   • Follow-up     ER       History of Present Illness: Merle Muir is a 42 y.o. female who is here today for a Gastroenterology Consultation for diarrhea.      Merle reports years of post-prandial diarrhea. Recently started having constipation for the past 2 weeks.  There has been some rectal bleeding with straining.  Now back to having diarrhea. No longer having bleeding. Had constipation prior to merry but since merry as been having diarrhea.  There is associated lower abdominal pain.   This is gas and bloating.  Has tried cutting out diarrhea which is helpful but does not resolve the issues.    There is frequent nausea and vomiting.  Rarely has reflux but reports excessive belching.  There is no trouble swallowing.  There is anxiety and depression.  There is occasionally epigastric pain- not often.       She reports a history of pill cam and colonscopy- unsure where or when or why these were done. She does have a history of pelvic floor dysfunction.     abd surgical history includes merry, and exporatory laparotomy, c section, e-stem for urinary incontinence  She was recently evaluated in the ED with benign appearing blood work and CT scan    CT Abdomen Pelvis With Contrast (2022 23:18)      Subjective      Review of Systems:   Review of Systems   Constitutional: Negative for appetite change and unexpected weight loss.   HENT: Negative for trouble swallowing.    Gastrointestinal: Positive for abdominal distention, abdominal pain, anal bleeding, constipation, diarrhea, nausea, vomiting and indigestion. Negative for blood in stool, rectal pain and GERD.       Past Medical History:   Past Medical History:   Diagnosis Date   • Cholelithiasis     Gallbladder been removed due to infection   • Lactose intolerance     I think so not sure        Past Surgical History:   Past Surgical History:   Procedure Laterality Date   • CHOLECYSTECTOMY     • COLONOSCOPY         Family History:   Family History   Family history unknown: Yes       Social History:   Social History     Socioeconomic History   • Marital status: Single   Tobacco Use   • Smoking status: Never Smoker   • Smokeless tobacco: Never Used   Substance and Sexual Activity   • Alcohol use: Never   • Drug use: Never   • Sexual activity: Yes     Partners: Male     Birth control/protection: None       Alcohol/Tobacco History:   Social History     Substance and Sexual Activity   Alcohol Use Never     Social History     Tobacco Use   Smoking Status Never Smoker   Smokeless Tobacco Never Used       Medications:     Current Outpatient Medications:   •  Acetaminophen 500 MG capsule, Take  by mouth Every 4 (Four) Hours As Needed., Disp: , Rfl:   •  albuterol sulfate  (90 Base) MCG/ACT inhaler, albuterol sulfate HFA 90 mcg/actuation aerosol inhaler, Disp: , Rfl:   •  Cholecalciferol (VITAMIN D3) 50 MCG (2000 UT) capsule, Vitamin D3 50 mcg (2,000 unit) capsule  2 capsules daily OTC, Disp: , Rfl:   •  cyclobenzaprine (FLEXERIL) 10 MG tablet, Take 10 mg by mouth 3 (Three) Times a Day As Needed., Disp: , Rfl:   •  Fluticasone Furoate-Vilanterol (BREO ELLIPTA) 100-25 MCG/INH inhaler, Every 12 (Twelve) Hours., Disp: , Rfl:   •  Noemi 0.35 MG tablet, Take 1 tablet by mouth Daily., Disp: , Rfl:   •  hydrOXYzine pamoate (VISTARIL) 25 MG capsule, Take 25 mg by mouth 3 (Three) Times a Day As Needed., Disp: , Rfl:   •  ipratropium (ATROVENT) 0.03 % nasal spray, ipratropium bromide 0.03 % nasal spray, Disp: , Rfl:   •  levETIRAcetam (KEPPRA) 750 MG tablet, Take 750 mg by mouth 2 (Two) Times a Day., Disp: , Rfl:   •  Melatonin 10 MG tablet, Every Night., Disp: , Rfl:   •  naproxen (NAPROSYN) 500 MG tablet, Take 500 mg by mouth 2 (Two) Times a Day With Meals., Disp: , Rfl:   •  ondansetron ODT (ZOFRAN-ODT) 4 MG  "disintegrating tablet, Place 1 tablet 3 times a day by translingual route as needed for 2 days., Disp: , Rfl:   •  pregabalin (LYRICA) 50 MG capsule, pregabalin 50 mg capsule, Disp: , Rfl:   •  Semaglutide, 1 MG/DOSE, (Ozempic, 1 MG/DOSE,) 2 MG/1.5ML solution pen-injector, , Disp: , Rfl:   •  sertraline (ZOLOFT) 50 MG tablet, Take 50 mg by mouth Daily., Disp: , Rfl:   •  Vraylar 3 MG capsule capsule, Take 1 capsule every day by oral route for 90 days., Disp: , Rfl:   •  colestipol (COLESTID) 1 g tablet, Take 1 tablet by mouth 2 (Two) Times a Day., Disp: 60 tablet, Rfl: 2  •  naproxen (NAPROSYN) 500 MG tablet, naproxen 500 mg tablet, Disp: , Rfl:   •  omeprazole (priLOSEC) 20 MG capsule, Take 1 capsule by mouth Daily., Disp: 30 capsule, Rfl: 5    Allergies:   Allergies   Allergen Reactions   • Asa [Aspirin] Other (See Comments)     Unknown   • Minocycline Other (See Comments)     Renal failure   • Rifampin Other (See Comments)     Renal failure   • Sudafed [Pseudoephedrine Hcl] Other (See Comments)     Pt states it shuts her body down       Objective     Physical Exam:  Vital Signs:   Vitals:    09/26/22 0818   BP: 140/90   BP Location: Right arm   Patient Position: Sitting   Cuff Size: Adult   Pulse: 77   Temp: 97.6 °F (36.4 °C)   TempSrc: Temporal   SpO2: 99%   Weight: 106 kg (234 lb)   Height: 157.5 cm (62\")     Body mass index is 42.8 kg/m².     Physical Exam  Vitals and nursing note reviewed.   Constitutional:       General: She is not in acute distress.     Appearance: She is well-developed. She is obese. She is not diaphoretic.   Eyes:      General: No scleral icterus.     Conjunctiva/sclera: Conjunctivae normal.   Neck:      Thyroid: No thyromegaly.   Cardiovascular:      Rate and Rhythm: Normal rate and regular rhythm.   Pulmonary:      Effort: Pulmonary effort is normal.      Breath sounds: Normal breath sounds.   Abdominal:      General: Bowel sounds are normal. There is no distension.      Palpations: " Abdomen is soft.      Tenderness: There is no abdominal tenderness. There is no guarding or rebound.      Hernia: No hernia is present.   Musculoskeletal:      Cervical back: Neck supple.      Right lower leg: No edema.      Left lower leg: No edema.   Skin:     General: Skin is warm and dry.      Capillary Refill: Capillary refill takes 2 to 3 seconds.      Coloration: Skin is not jaundiced or pale.      Findings: No bruising or petechiae.      Nails: There is no clubbing.   Neurological:      Mental Status: She is alert and oriented to person, place, and time.   Psychiatric:         Behavior: Behavior normal.         Thought Content: Thought content normal.         Judgment: Judgment normal.         Assessment / Plan      Assessment/Plan:   Diagnoses and all orders for this visit:    1. Bile salt-induced diarrhea (Primary)  -     Ambulatory Referral For Screening Colonoscopy  Diarrhea with likely overlapping IBS, lactose intolerance, and bile salt diarrhea.  We will start colestipol twice daily.  Recommend colonoscopy due to rectal bleeding and chronicity of diarrhea.  Continue to avoid dairy.  2. Rectal bleeding  -     Ambulatory Referral For Screening Colonoscopy    3. Dyspepsia  -     Ambulatory referral for Screening EGD  -     omeprazole (priLOSEC) 20 MG capsule; Take 1 capsule by mouth Daily.  Dispense: 30 capsule; Refill: 5    4. Chronic nausea  -     Ambulatory referral for Screening EGD  -     omeprazole (priLOSEC) 20 MG capsule; Take 1 capsule by mouth Daily.  Dispense: 30 capsule; Refill: 5  -Reading her chronic nausea, she is on ozempic which may be contributing.  Will trial PPi and schedule EGD.         Follow Up:   Return in about 6 weeks (around 11/7/2022), or if symptoms worsen or fail to improve.    Plan of care reviewed with the patient at the conclusion of today's visit.  Education was provided regarding diagnosis, management, and any prescribed or recommended OTC medications.  Patient  verbalized understanding of and agreement with management plan.     OFE Clark  [facility]

## 2022-09-26 NOTE — TELEPHONE ENCOUNTER
Venecia,  Patient called and her pharmacy only got one of her prescriptions. They didn't receive the colestipol

## 2022-10-26 ENCOUNTER — PRIOR AUTHORIZATION (OUTPATIENT)
Dept: GASTROENTEROLOGY | Facility: CLINIC | Age: 42
End: 2022-10-26

## 2022-11-10 ENCOUNTER — OUTSIDE FACILITY SERVICE (OUTPATIENT)
Dept: GASTROENTEROLOGY | Facility: CLINIC | Age: 42
End: 2022-11-10

## 2022-11-10 PROCEDURE — 45380 COLONOSCOPY AND BIOPSY: CPT | Performed by: INTERNAL MEDICINE

## 2022-11-10 PROCEDURE — 43239 EGD BIOPSY SINGLE/MULTIPLE: CPT | Performed by: INTERNAL MEDICINE

## 2022-11-10 PROCEDURE — 88305 TISSUE EXAM BY PATHOLOGIST: CPT

## 2022-11-11 ENCOUNTER — LAB REQUISITION (OUTPATIENT)
Dept: LAB | Facility: HOSPITAL | Age: 42
End: 2022-11-11

## 2022-11-11 DIAGNOSIS — K21.00 GASTRO-ESOPHAGEAL REFLUX DISEASE WITH ESOPHAGITIS, WITHOUT BLEEDING: ICD-10-CM

## 2022-11-11 DIAGNOSIS — R19.7 DIARRHEA, UNSPECIFIED: ICD-10-CM

## 2022-11-11 DIAGNOSIS — Q39.6 CONGENITAL DIVERTICULUM OF ESOPHAGUS: ICD-10-CM

## 2022-11-11 DIAGNOSIS — K76.6 PORTAL HYPERTENSION: ICD-10-CM

## 2022-11-11 DIAGNOSIS — R10.13 EPIGASTRIC PAIN: ICD-10-CM

## 2022-11-11 DIAGNOSIS — K31.89 OTHER DISEASES OF STOMACH AND DUODENUM: ICD-10-CM

## 2022-11-11 DIAGNOSIS — K64.8 OTHER HEMORRHOIDS: ICD-10-CM

## 2022-11-11 DIAGNOSIS — K62.5 HEMORRHAGE OF ANUS AND RECTUM: ICD-10-CM

## 2022-11-14 LAB — REF LAB TEST METHOD: NORMAL

## 2022-11-15 DIAGNOSIS — B96.81 HELICOBACTER PYLORI GASTRITIS: Primary | ICD-10-CM

## 2022-11-15 DIAGNOSIS — R11.0 CHRONIC NAUSEA: ICD-10-CM

## 2022-11-15 DIAGNOSIS — R10.13 DYSPEPSIA: ICD-10-CM

## 2022-11-15 DIAGNOSIS — K29.70 HELICOBACTER PYLORI GASTRITIS: Primary | ICD-10-CM

## 2022-11-15 RX ORDER — CLARITHROMYCIN 500 MG/1
500 TABLET, COATED ORAL 2 TIMES DAILY
Qty: 28 TABLET | Refills: 0 | Status: SHIPPED | OUTPATIENT
Start: 2022-11-15

## 2022-11-15 RX ORDER — OMEPRAZOLE 40 MG/1
40 CAPSULE, DELAYED RELEASE ORAL 2 TIMES DAILY
Qty: 28 CAPSULE | Refills: 0 | Status: SHIPPED | OUTPATIENT
Start: 2022-11-15 | End: 2022-11-29

## 2022-11-15 RX ORDER — AMOXICILLIN 500 MG/1
1000 CAPSULE ORAL 2 TIMES DAILY
Qty: 56 CAPSULE | Refills: 0 | Status: SHIPPED | OUTPATIENT
Start: 2022-11-15

## 2023-01-09 DIAGNOSIS — K90.89 BILE SALT-INDUCED DIARRHEA: ICD-10-CM

## 2023-01-11 RX ORDER — MONTELUKAST SODIUM 4 MG/1
1 TABLET, CHEWABLE ORAL 2 TIMES DAILY
Qty: 60 TABLET | Refills: 2 | Status: SHIPPED | OUTPATIENT
Start: 2023-01-11 | End: 2023-04-07

## 2023-04-06 DIAGNOSIS — K90.89 BILE SALT-INDUCED DIARRHEA: ICD-10-CM

## 2023-04-07 RX ORDER — MONTELUKAST SODIUM 4 MG/1
1 TABLET, CHEWABLE ORAL 2 TIMES DAILY
Qty: 60 TABLET | Refills: 2 | Status: SHIPPED | OUTPATIENT
Start: 2023-04-07

## 2023-04-07 RX ORDER — OMEPRAZOLE 20 MG/1
CAPSULE, DELAYED RELEASE ORAL
Qty: 30 CAPSULE | Refills: 2 | Status: SHIPPED | OUTPATIENT
Start: 2023-04-07

## 2023-09-26 RX ORDER — METHOCARBAMOL 500 MG/1
500 TABLET, FILM COATED ORAL 3 TIMES DAILY
COMMUNITY
Start: 2023-06-07

## 2023-09-26 RX ORDER — PRAZOSIN HYDROCHLORIDE 5 MG/1
5 CAPSULE ORAL NIGHTLY
COMMUNITY
Start: 2023-06-07

## 2023-10-03 ENCOUNTER — OFFICE VISIT (OUTPATIENT)
Dept: BARIATRICS/WEIGHT MGMT | Facility: CLINIC | Age: 43
End: 2023-10-03
Payer: MEDICAID

## 2023-10-03 ENCOUNTER — OFFICE VISIT (OUTPATIENT)
Dept: BEHAVIORAL HEALTH | Facility: CLINIC | Age: 43
End: 2023-10-03
Payer: MEDICAID

## 2023-10-03 VITALS
RESPIRATION RATE: 18 BRPM | HEART RATE: 86 BPM | OXYGEN SATURATION: 98 % | TEMPERATURE: 97.7 F | DIASTOLIC BLOOD PRESSURE: 82 MMHG | WEIGHT: 237 LBS | HEIGHT: 62 IN | BODY MASS INDEX: 43.61 KG/M2 | SYSTOLIC BLOOD PRESSURE: 136 MMHG

## 2023-10-03 DIAGNOSIS — G47.33 OSA ON CPAP: ICD-10-CM

## 2023-10-03 DIAGNOSIS — F43.10 POST TRAUMATIC STRESS DISORDER (PTSD): ICD-10-CM

## 2023-10-03 DIAGNOSIS — E66.01 OBESITY, CLASS III, BMI 40-49.9 (MORBID OBESITY): Primary | ICD-10-CM

## 2023-10-03 DIAGNOSIS — E11.9 TYPE 2 DIABETES MELLITUS WITHOUT COMPLICATION, WITHOUT LONG-TERM CURRENT USE OF INSULIN: ICD-10-CM

## 2023-10-03 DIAGNOSIS — F32.89 OTHER DEPRESSION: ICD-10-CM

## 2023-10-03 DIAGNOSIS — Z71.89 ENCOUNTER FOR PSYCHOLOGICAL ASSESSMENT PRIOR TO BARIATRIC SURGERY: ICD-10-CM

## 2023-10-03 DIAGNOSIS — J45.909 ASTHMA, UNSPECIFIED ASTHMA SEVERITY, UNSPECIFIED WHETHER COMPLICATED, UNSPECIFIED WHETHER PERSISTENT: ICD-10-CM

## 2023-10-03 DIAGNOSIS — R53.82 CHRONIC FATIGUE: ICD-10-CM

## 2023-10-03 DIAGNOSIS — K21.9 GASTROESOPHAGEAL REFLUX DISEASE, UNSPECIFIED WHETHER ESOPHAGITIS PRESENT: ICD-10-CM

## 2023-10-03 DIAGNOSIS — E66.01 MORBID OBESITY WITH BMI OF 40.0-44.9, ADULT: Primary | ICD-10-CM

## 2023-10-03 PROBLEM — Z86.19 HISTORY OF HELICOBACTER PYLORI INFECTION: Status: ACTIVE | Noted: 2023-10-03

## 2023-10-03 PROBLEM — G40.909 EPILEPSY: Status: ACTIVE | Noted: 2023-10-03

## 2023-10-03 PROBLEM — M54.9 BACK PAIN: Status: ACTIVE | Noted: 2023-10-03

## 2023-10-03 PROBLEM — F41.9 ANXIETY AND DEPRESSION: Status: ACTIVE | Noted: 2023-10-03

## 2023-10-03 PROBLEM — R11.0 NAUSEA: Status: ACTIVE | Noted: 2023-10-03

## 2023-10-03 PROBLEM — F32.A ANXIETY AND DEPRESSION: Status: ACTIVE | Noted: 2023-10-03

## 2023-10-03 PROBLEM — M79.7 FIBROMYALGIA: Status: ACTIVE | Noted: 2023-10-03

## 2023-10-03 PROBLEM — K80.20 CHOLELITHIASIS: Status: ACTIVE | Noted: 2023-10-03

## 2023-10-03 PROCEDURE — 1159F MED LIST DOCD IN RCRD: CPT | Performed by: PHYSICIAN ASSISTANT

## 2023-10-03 PROCEDURE — 1160F RVW MEDS BY RX/DR IN RCRD: CPT | Performed by: PHYSICIAN ASSISTANT

## 2023-10-03 PROCEDURE — 90791 PSYCH DIAGNOSTIC EVALUATION: CPT | Performed by: PSYCHOLOGIST

## 2023-10-03 PROCEDURE — 1159F MED LIST DOCD IN RCRD: CPT | Performed by: PSYCHOLOGIST

## 2023-10-03 PROCEDURE — 1160F RVW MEDS BY RX/DR IN RCRD: CPT | Performed by: PSYCHOLOGIST

## 2023-10-03 PROCEDURE — 99204 OFFICE O/P NEW MOD 45 MIN: CPT | Performed by: PHYSICIAN ASSISTANT

## 2023-10-03 NOTE — PROGRESS NOTES
[facility] BARIATRIC SURGERY  [address]  [address]  [facility] [id number] [phone]      Patient  Name:  Merle Muir  :  1980      Date of Visit: 10/03/2023      Chief Complaint:  weight gain; unable to maintain weight loss      History of Present Illness:  Merle Muir is a 43 y.o. female who presents today for evaluation, education and consultation regarding metabolic and bariatric surgery with Dr. Cerna.     Merle has been overweight for at least 20+ years, has been 35 pounds or more overweight for at least 20+ years, has been 100 pounds or more overweight for 15+ or more years and started dieting at age 20.  Previous diet attempts include: Low Carbohydrate, Low Fat, and Calorie Counting; None; None.  The most weight Merle lost was 5 pounds but was unable to maintain that weight loss.  Her maximum lifetime weight is 245 pounds.     GI: GERD treated with daily Prilosec. EGD in . Hx of h pylori. S/p lap merry in  for gallstones. Reports history of chronic nausea.     History of epilepsy on Keppra and s/p neurostimulator. Follows with Dr. Deleon.     Type 2 diabetic-dx in . A1c is 7. Currently on Ozempic. No hx of insulin use.     Other PMHx significant for BALAJI on CPAP, asthma, anxiety/depression, PTSD, hx of suicide attempt, chronic back pain.       Complete history has been obtained and discussed today, as pertinent to metabolic/ bariatric surgery.     Past Medical History:   Diagnosis Date    Anxiety and depression     Asthma     Back pain     Naproxen and Flexeril; hx of steroid injections    Chest pain     Cholelithiasis     Gallbladder been removed due to infection    Epilepsy     Keppra; has neurotransmiter in place. Dr. Deleon    Fibromyalgia     GERD (gastroesophageal reflux disease)     Omeprazole; EGD in ; hx of h pylori    History of Helicobacter pylori infection     s/p treatment    History of renal insufficiency     History  of suicide attempt     at age 18 and 20    Lactose intolerance     I think so not sure    Nausea     chronic    BALAJI on CPAP     PTSD (post-traumatic stress disorder)     Type 2 diabetes mellitus     dx in ; no insulin; last a1c 7    Urinary incontinence      Past Surgical History:   Procedure Laterality Date     SECTION      lower transverse    CRANIAL NEUROSTIMULATOR INSERTION/REPLACEMENT      battery replaced in     DIAGNOSTIC LAPAROSCOPY      ovarian cysts    ENDOSCOPY AND COLONOSCOPY      LAPAROSCOPIC CHOLECYSTECTOMY      gallstones    NASAL SINUS SURGERY      TEETH EXTRACTION         Allergies   Allergen Reactions    Minocycline Other (See Comments)     Renal failure    Rifampin Other (See Comments)     Renal failure    Sudafed [Pseudoephedrine Hcl] Other (See Comments)     Pt states it shuts her body down    Asa [Aspirin] Other (See Comments)     Unknown    Lactose Intolerance (Gi) GI Intolerance       Current Outpatient Medications:     Acetaminophen 500 MG capsule, Take  by mouth Every 4 (Four) Hours As Needed., Disp: , Rfl:     albuterol sulfate  (90 Base) MCG/ACT inhaler, albuterol sulfate HFA 90 mcg/actuation aerosol inhaler, Disp: , Rfl:     Cholecalciferol (VITAMIN D3) 50 MCG (2000 UT) capsule, Vitamin D3 50 mcg (2,000 unit) capsule  2 capsules daily OTC, Disp: , Rfl:     colestipol (COLESTID) 1 g tablet, Take 1 tablet by mouth 2 (Two) Times a Day., Disp: 60 tablet, Rfl: 2    cyclobenzaprine (FLEXERIL) 10 MG tablet, Take 1 tablet by mouth 3 (Three) Times a Day As Needed., Disp: , Rfl:     Fluticasone Furoate-Vilanterol (BREO ELLIPTA) 100-25 MCG/INH inhaler, Every 12 (Twelve) Hours., Disp: , Rfl:     hydrOXYzine pamoate (VISTARIL) 25 MG capsule, Take 1 capsule by mouth 3 (Three) Times a Day As Needed., Disp: , Rfl:     ipratropium (ATROVENT) 0.03 % nasal spray, ipratropium bromide 0.03 % nasal spray, Disp: , Rfl:     levETIRAcetam (KEPPRA) 750 MG tablet, Take 1  tablet by mouth 2 (Two) Times a Day., Disp: , Rfl:     Melatonin 10 MG tablet, Every Night., Disp: , Rfl:     methocarbamol (ROBAXIN) 500 MG tablet, Take 1 tablet by mouth 3 (Three) Times a Day., Disp: , Rfl:     mometasone-formoterol (DULERA 100) 100-5 MCG/ACT inhaler, Inhale 2 puffs 2 (Two) Times a Day., Disp: , Rfl:     naproxen (NAPROSYN) 500 MG tablet, Take 1 tablet by mouth 2 (Two) Times a Day With Meals., Disp: , Rfl:     omeprazole (priLOSEC) 20 MG capsule, TAKE ONE CAPSULE BY MOUTH EVERY DAY, Disp: 30 capsule, Rfl: 2    ondansetron ODT (ZOFRAN-ODT) 4 MG disintegrating tablet, Place 1 tablet 3 times a day by translingual route as needed for 2 days., Disp: , Rfl:     prazosin (MINIPRESS) 5 MG capsule, Take 1 capsule by mouth Every Night., Disp: , Rfl:     pregabalin (LYRICA) 50 MG capsule, pregabalin 50 mg capsule, Disp: , Rfl:     Semaglutide, 1 MG/DOSE, (Ozempic, 1 MG/DOSE,) 2 MG/1.5ML solution pen-injector, , Disp: , Rfl:     Vraylar 3 MG capsule capsule, Take 1 capsule every day by oral route for 90 days., Disp: , Rfl:     amoxicillin (AMOXIL) 500 MG capsule, Take 2 capsules by mouth 2 (Two) Times a Day. (Patient not taking: Reported on 10/3/2023), Disp: 56 capsule, Rfl: 0    clarithromycin (BIAXIN) 500 MG tablet, Take 1 tablet by mouth 2 (Two) Times a Day. (Patient not taking: Reported on 10/3/2023), Disp: 28 tablet, Rfl: 0    Noemi 0.35 MG tablet, Take 1 tablet by mouth Daily. (Patient not taking: Reported on 10/3/2023), Disp: , Rfl:     sertraline (ZOLOFT) 50 MG tablet, Take 1 tablet by mouth Daily. (Patient not taking: Reported on 10/3/2023), Disp: , Rfl:     Social History     Socioeconomic History    Marital status: Single   Tobacco Use    Smoking status: Never    Smokeless tobacco: Never   Vaping Use    Vaping Use: Never used   Substance and Sexual Activity    Alcohol use: Yes     Comment: once a month    Drug use: Never    Sexual activity: Yes     Partners: Male     Birth control/protection:  None     Social History     Social History Narrative    Lives in [place], [place]  with 1 child and is currently disabled.        Family History   Adopted: Yes   Family history unknown: Yes       Review of Systems:  Constitutional:  reports fatigue, weight gain and denies fevers, chills.  HEENT:  denies headache, ear pain or loss of hearing, blurred or double vision, nasal discharge or sore throat.  Cardiovascular:  reports none and denies HTN, HLD, CAD, Atrial Fib, hx heart disease, heart murmur, hx MI, chest pain, palpitations, edema, hx DVT.  Respiratory:  reports sleep apnea, asthma and denies dyspnea on exertion, shortness of breath , cough , wheezing, COPD, hx PE.  Gastrointestinal:  reports heartburn, nausea, gallbladder issues and denies dysphagia, vomiting, abdominal pain, IBS, diarrhea, constipation, melena, blood in stool, liver disease, hx pancreatitis.  Genitourinary:  reports renal insufficiency, incontinence and denies history of  frequent UTI,  hematuria, dysuria, polyuria, polydipsia, renal failure.    Musculoskeletal:  reports back pain and denies joint pain and autoimmune disease.  Neurological:  reports headaches, migraines, seizure and denies numbness /tingling, dizziness, confusion, stroke.  Psychiatric:  reports hx depression, hx anxiety, hx suicide attempt and denies depressed mood, feeling anxious, bipolar disorder, suicidal ideation, hx substance abuse, eating disorder.  Endocrine:  reports PCOS, diabetes and denies endometriosis, glucose intolerance, thyroid disease, gout.  Hematologic:  reports none and denies bruising, bleeding disorder, hx anemia, hx blood transfusion.  Skin:  reports none and denies rashes, hx MRSA.    Physical Exam:  Vital Signs:  Weight: 108 kg (237 lb)   Body mass index is 43.35 kg/m².  Temp: 97.7 °F (36.5 °C)   Heart Rate: 86   BP: 136/82     Physical Exam  Constitutional:       Appearance: She is obese.   HENT:      Head: Normocephalic and atraumatic.    Eyes:      Extraocular Movements: Extraocular movements intact.      Conjunctiva/sclera: Conjunctivae normal.   Cardiovascular:      Rate and Rhythm: Normal rate and regular rhythm.   Pulmonary:      Effort: Pulmonary effort is normal.      Breath sounds: Normal breath sounds.   Abdominal:      General: Bowel sounds are normal. There is no distension.      Palpations: Abdomen is soft. There is no mass.      Tenderness: There is no abdominal tenderness.      Comments: Old lap scars; lower transverse scar from prior c section.    Musculoskeletal:         General: Normal range of motion.      Cervical back: Normal range of motion and neck supple.   Skin:     General: Skin is warm and dry.   Neurological:      General: No focal deficit present.      Mental Status: She is alert and oriented to person, place, and time.   Psychiatric:         Mood and Affect: Mood normal.         Behavior: Behavior normal.         Thought Content: Thought content normal.         Judgment: Judgment normal.       Patient Active Problem List   Diagnosis    Asthma    Generalized anxiety disorder    Insomnia    Moderate recurrent major depression    Pelvic and perineal pain    GERD (gastroesophageal reflux disease)    History of Helicobacter pylori infection    Back pain    Type 2 diabetes mellitus    BALAJI on CPAP    Nausea    Fibromyalgia    Epilepsy    Cholelithiasis    Anxiety and depression       Assessment:  43 y.o. female with medically complicated obesity pursuing sleeve gastrectomy.    Metabolic & Bariatric Surgery is deemed medically necessary given the following:  Class 3 Severe Obesity (BMI >=40). Obesity-related health conditions include the following: obstructive sleep apnea, diabetes mellitus, and GERD. Obesity is worsening. BMI is is above average; BMI management plan is completed. We discussed consulting a Bariatric surgeon.          Plan:  Further evaluation will include: CBC, CMP, Lipids, TSH, HgA1C, H.Pylori UBT, Gastric  Emptying Study, and EGD.    Additional clearances needed prior to surgery will include: Cardiology, Pulmonary, and Neurology.     Patient understands that bariatric surgery is not cosmetic surgery but rather a tool to help make a lifelong commitment to lifestyle changes including diet, exercise and behavior modifications.  The patient has been educated today on those expected postoperative lifestyle changes.  Psychological and Nutritional consultations will be completed prior to surgery.  Instructions on how to access [employer] (an internet based site w/ educational surgical videos) were given to the patient.  Recommended perioperative vitamin supplementation was reviewed.  The importance of avoiding ASA/ NSAIDS/ steroids/ tobacco/nicotine/ hormones/ immunomodulators perioperatively was discussed in detail.  All questions/concerns have been addressed.      Further input to follow pending the above.           JENNIFER Linn      ADDENDUM:     No record of EGD in 2022, patient did have PillCam.  I will arrange for an EGD at [facility].  She will need to hold her Ozempic 2 weeks prior.

## 2023-10-03 NOTE — PROGRESS NOTES
PROGRESS NOTE    Data:    Merle Muir is a 43 y.o. female who met with the undersigned for a scheduled psychological evaluation from 12:45 - 1:30 pm.      Clinical Maneuvering/Intervention:      Chief complaint and history of presenting illness/Problems: struggling with obesity for several years. Despite trying different weight loss plans and diets, the pt reported being unsuccessful in losing weight. A psychological evaluation was conducted in order to assess past and current level of functioning. Areas assessed included, but were not limited to: perception of social support, perception of ability to face and deal with challenges in life (positive functioning), anxiety symptoms, depressive symptoms, perspective on beliefs/belief system, coping skills for stress, intelligence level, addiction issues, etc. Therapeutic rapport was established. Interventions conducted today were geared towards assessing the pt's readiness for weight loss surgery and identifying and psychological contraindications for undergoing such a major life change. Social support was deemed strong (specific to weight loss surgery/weight loss in this manner and in a general sense): fiance, friends, therapist, and doctor. Current psychological struggles were described as low to moderate and included PTSD, depression, and frustrations with being overweight. Coping skills for distress and related to undergoing a major life change such as weight loss surgery/weight loss were deemed strong and included asking for help, good sense of humor, follows directions well, responsible person, and maintains quality relationships with others. She expressed wanting to have weight loss surgery, while at the same time, feeling quite nervous about the surgery itself. She expressed worry about complications and was quite fidgety throughout the visit. An action plan was designed to empower the pt to know what to do. Simple steps of one, two, three were laid out in  order to help the pt feel more in control of things and less anxious. She will follow up with this therapist in two months. The reason explained was that she needs to give herself time to feel more centered and confident about her decision to have weight loss surgery.     Past Family and Social History:      History of family mental health problems: mother (substance abuse), father (substance abuse)     Psychosocial history: treatment of psychiatric care in the past (N/A), alcohol/substance abuse treatment in the past (N/A) , alcohol/substance abuse problems (N/A), inpatient psychiatric care (N/A).    Mental Status Exam (MSE):  Hygiene:  fair  Dress: normal  Attitude:  cooperative   Motor Activity: fidgety  Speech: normal  Mood:   anxious  Affect:  congruent  Thought Processes: normal  Thought Content:  normal  Suicidal Thoughts:  not endorsed  Homicidal Thoughts:  not endorsed  Crisis Safety Plan: not needed   Hallucinations:  denied      Patient's Support Network Includes:  family, friends      Progress toward goal: there is evidence to suggest that she is taking measures to improve the quality of her life including seeking weight loss surgery      Functional Status: moderate      Prognosis: good with weight loss surgery    Evaluation, Diagnoses, and Ability/Capacity to Respond to Treatment:      The pt presented to be struggling with PTSD, depression, and being overweight (BMI = 43.35, morbid obesity). Results of MSE demonstrated a functional status of moderate. She often struggles to leave her home related to anxiety. She has seizures, which seem to exacerbate emotional distress. Strengths: asking for help, etc (see detailed list of coping skills above). Needed for growth (CPT code requirement for Weaknesses): psychotropic medication, counseling and weight loss.      From a psychological standpoint, the pt does not present as a good candidate for bariatric surgery.     Treatment Plan:      Short term  goals/required for weight loss surgery: A second psychological evaluation, not to occur at this facility sooner than 12/2/23.     Short term goals: Continue with psychotropic medication as prescribed and with regular weekly counseling sessions. Heal from anxiety/PTSD and depression.  Long term goals: Reach a healthy weight, heal from PTSD/depression, and feel more positive in mood overall.     Caity Sylvester, PhD, LP

## 2023-10-04 LAB
ALBUMIN SERPL-MCNC: 4.4 G/DL (ref 3.9–4.9)
ALBUMIN/GLOB SERPL: 1.3 {RATIO} (ref 1.2–2.2)
ALP SERPL-CCNC: 134 IU/L (ref 44–121)
ALT SERPL-CCNC: 42 IU/L (ref 0–32)
AST SERPL-CCNC: 29 IU/L (ref 0–40)
BASOPHILS # BLD AUTO: 0.1 X10E3/UL (ref 0–0.2)
BASOPHILS NFR BLD AUTO: 1 %
BILIRUB SERPL-MCNC: 0.4 MG/DL (ref 0–1.2)
BUN SERPL-MCNC: 12 MG/DL (ref 6–24)
BUN/CREAT SERPL: 16 (ref 9–23)
CALCIUM SERPL-MCNC: 9.8 MG/DL (ref 8.7–10.2)
CHLORIDE SERPL-SCNC: 101 MMOL/L (ref 96–106)
CHOLEST SERPL-MCNC: 188 MG/DL (ref 100–199)
CO2 SERPL-SCNC: 24 MMOL/L (ref 20–29)
CREAT SERPL-MCNC: 0.76 MG/DL (ref 0.57–1)
EGFRCR SERPLBLD CKD-EPI 2021: 101 ML/MIN/1.73
EOSINOPHIL # BLD AUTO: 0.2 X10E3/UL (ref 0–0.4)
EOSINOPHIL NFR BLD AUTO: 2 %
ERYTHROCYTE [DISTWIDTH] IN BLOOD BY AUTOMATED COUNT: 14.5 % (ref 11.7–15.4)
GLOBULIN SER CALC-MCNC: 3.5 G/DL (ref 1.5–4.5)
GLUCOSE SERPL-MCNC: 110 MG/DL (ref 70–99)
HBA1C MFR BLD: 5.9 % (ref 4.8–5.6)
HCT VFR BLD AUTO: 43.9 % (ref 34–46.6)
HDLC SERPL-MCNC: 38 MG/DL
HGB BLD-MCNC: 14.1 G/DL (ref 11.1–15.9)
IMM GRANULOCYTES # BLD AUTO: 0.1 X10E3/UL (ref 0–0.1)
IMM GRANULOCYTES NFR BLD AUTO: 1 %
LDLC SERPL CALC-MCNC: 120 MG/DL (ref 0–99)
LYMPHOCYTES # BLD AUTO: 3.2 X10E3/UL (ref 0.7–3.1)
LYMPHOCYTES NFR BLD AUTO: 32 %
MCH RBC QN AUTO: 26 PG (ref 26.6–33)
MCHC RBC AUTO-ENTMCNC: 32.1 G/DL (ref 31.5–35.7)
MCV RBC AUTO: 81 FL (ref 79–97)
MONOCYTES # BLD AUTO: 0.7 X10E3/UL (ref 0.1–0.9)
MONOCYTES NFR BLD AUTO: 8 %
NEUTROPHILS # BLD AUTO: 5.6 X10E3/UL (ref 1.4–7)
NEUTROPHILS NFR BLD AUTO: 56 %
PLATELET # BLD AUTO: 296 X10E3/UL (ref 150–450)
POTASSIUM SERPL-SCNC: 4.3 MMOL/L (ref 3.5–5.2)
PROT SERPL-MCNC: 7.9 G/DL (ref 6–8.5)
RBC # BLD AUTO: 5.42 X10E6/UL (ref 3.77–5.28)
SODIUM SERPL-SCNC: 139 MMOL/L (ref 134–144)
TRIGL SERPL-MCNC: 166 MG/DL (ref 0–149)
TSH SERPL DL<=0.005 MIU/L-ACNC: 1.43 UIU/ML (ref 0.45–4.5)
VLDLC SERPL CALC-MCNC: 30 MG/DL (ref 5–40)
WBC # BLD AUTO: 9.8 X10E3/UL (ref 3.4–10.8)

## 2023-10-05 RX ORDER — OMEPRAZOLE 20 MG/1
CAPSULE, DELAYED RELEASE ORAL
Qty: 30 CAPSULE | Refills: 2 | Status: SHIPPED | OUTPATIENT
Start: 2023-10-05

## 2023-10-05 NOTE — TELEPHONE ENCOUNTER
Rx Refill Note  Pending Prescriptions:                       Disp   Refills    omeprazole (priLOSEC) 20 MG capsule [Pharm*30 cap*2        Sig: TAKE ONE CAPSULE BY MOUTH EVERY DAY    Last office visit with prescribing clinician: Visit date not found   Last telemedicine visit with prescribing clinician: Visit date not found   Next office visit with prescribing clinician: Visit date not found         Milli Ponce MA  10/05/23, 08:47 EDT

## 2023-10-10 ENCOUNTER — DOCUMENTATION (OUTPATIENT)
Dept: BARIATRICS/WEIGHT MGMT | Facility: CLINIC | Age: 43
End: 2023-10-10
Payer: MEDICAID

## 2023-10-10 NOTE — PROGRESS NOTES
"Weight Loss Surgery  Presurgical Nutrition Assessment     Merle Muir  10/03/2023  [id number]  [id number]  1980   female    Surgery desired: LSG (sleeve gastrectomy)     HEIGHT 157.5 cm (62\")   WEIGHT 108 kg (237 #)   BMI 43.35        Highest weight ever:  111 kg  (245 #)      Allergies   Allergen Reactions    Minocycline Other (See Comments)     Renal failure    Rifampin Other (See Comments)     Renal failure    Sudafed [Pseudoephedrine Hcl] Other (See Comments)     Pt states it shuts her body down    Asa [Aspirin] Other (See Comments)     Unknown    Lactose Intolerance (Gi) GI Intolerance       Current Outpatient Medications:     Acetaminophen 500 MG capsule, Take  by mouth Every 4 (Four) Hours As Needed., Disp: , Rfl:     albuterol sulfate  (90 Base) MCG/ACT inhaler, albuterol sulfate HFA 90 mcg/actuation aerosol inhaler, Disp: , Rfl:     amoxicillin (AMOXIL) 500 MG capsule, Take 2 capsules by mouth 2 (Two) Times a Day. (Patient not taking: Reported on 10/3/2023), Disp: 56 capsule, Rfl: 0    Cholecalciferol (VITAMIN D3) 50 MCG (2000 UT) capsule, Vitamin D3 50 mcg (2,000 unit) capsule  2 capsules daily OTC, Disp: , Rfl:     clarithromycin (BIAXIN) 500 MG tablet, Take 1 tablet by mouth 2 (Two) Times a Day. (Patient not taking: Reported on 10/3/2023), Disp: 28 tablet, Rfl: 0    colestipol (COLESTID) 1 g tablet, Take 1 tablet by mouth 2 (Two) Times a Day., Disp: 60 tablet, Rfl: 2    cyclobenzaprine (FLEXERIL) 10 MG tablet, Take 1 tablet by mouth 3 (Three) Times a Day As Needed., Disp: , Rfl:     Fluticasone Furoate-Vilanterol (BREO ELLIPTA) 100-25 MCG/INH inhaler, Every 12 (Twelve) Hours., Disp: , Rfl:     Noemi 0.35 MG tablet, Take 1 tablet by mouth Daily. (Patient not taking: Reported on 10/3/2023), Disp: , Rfl:     hydrOXYzine pamoate (VISTARIL) 25 MG capsule, Take 1 capsule by mouth 3 (Three) Times a Day As Needed., Disp: , Rfl:     ipratropium (ATROVENT) 0.03 % nasal spray, ipratropium " bromide 0.03 % nasal spray, Disp: , Rfl:     levETIRAcetam (KEPPRA) 750 MG tablet, Take 1 tablet by mouth 2 (Two) Times a Day., Disp: , Rfl:     Melatonin 10 MG tablet, Every Night., Disp: , Rfl:     methocarbamol (ROBAXIN) 500 MG tablet, Take 1 tablet by mouth 3 (Three) Times a Day., Disp: , Rfl:     mometasone-formoterol (DULERA 100) 100-5 MCG/ACT inhaler, Inhale 2 puffs 2 (Two) Times a Day., Disp: , Rfl:     naproxen (NAPROSYN) 500 MG tablet, Take 1 tablet by mouth 2 (Two) Times a Day With Meals., Disp: , Rfl:     omeprazole (priLOSEC) 20 MG capsule, TAKE ONE CAPSULE BY MOUTH EVERY DAY, Disp: 30 capsule, Rfl: 2    ondansetron ODT (ZOFRAN-ODT) 4 MG disintegrating tablet, Place 1 tablet 3 times a day by translingual route as needed for 2 days., Disp: , Rfl:     prazosin (MINIPRESS) 5 MG capsule, Take 1 capsule by mouth Every Night., Disp: , Rfl:     pregabalin (LYRICA) 50 MG capsule, pregabalin 50 mg capsule, Disp: , Rfl:     Semaglutide, 1 MG/DOSE, (Ozempic, 1 MG/DOSE,) 2 MG/1.5ML solution pen-injector, , Disp: , Rfl:     sertraline (ZOLOFT) 50 MG tablet, Take 1 tablet by mouth Daily. (Patient not taking: Reported on 10/3/2023), Disp: , Rfl:     Vraylar 3 MG capsule capsule, Take 1 capsule every day by oral route for 90 days., Disp: , Rfl:       Subjective information: Nutrition consult completed with patient, who is edentulous and who states that she has dentures, but that they cause choking, preventing her from wearing them. Therefore, she states, she is limited in the foods she can eat. No crunchy or chewy foods, such as steak, she says.  Also, patient reports frequent nightmares and severe back pain prevent her from sleeping through the night. When she awakens, she eats. This, along with snacking before bed, were described as primary problems preventing weight loss.     Nutrition Recall   Example of Usual 24 hour intake:     Breakfast: Usually skips, but when she does she picks up biscuits and gravy or  "pancakes from fast food restaurants, or chooses something from [employer].     Lunch: Has a busy schedule, so she eats out frequently at various restaurants.     Dinner: When she doesn't cook, heats TV dinners or eats pasta or Mexican food out.  Uses air fryer when she cooks meat at home    Snacks: \"Whatever I find - it depends on the day.\"    Beverages of Choice: Regular Dr Pepper - 32 oz four times each day (128 total ounces stated); coffee once weekly.  No tea reported    Food Allergies or Intolerances: lactose intolerant          Exercise / Activity: no current personal planned exercise or activity program      Assessment of Nutritional Adequacy, Excessive Intake or Deficiencies:        Protein intake is too low to ensure successful weight loss. Minimal intake                                       Processed / simple Carbohydrate intake is: high due to soda intake and frequent fast food meals                                       Balance of diet with a variety of fruits and vegetables is: poor - none recorded or stated                                                       Reliance on restaurant food including fast food is: as previously recorded                                                                          Ingestion of sweet beverages eg soda, sweet tea, fruit juice: extremely excessive - regular Dr Pepper, as above      Education    Provided Nutrition Guidelines for Bariatric and Metabolic Surgery   Reviewed guidelines for higher protein, limited carbohydrate diet to promote weight loss.  Encouraged patient to incorporate these principles of healthy eating.    Educated patient to wisely choose an appropriate protein supplement beverage for the post-surgery liquid diet.  Provided product guidelines and examples.    Explained importance of goal setting to help in changing eating behaviors that are not conducive to weight loss.  Specific macronutrient goals as below.   Provided follow-up options for " support, including contact information for dietitians here.     Discussed importance of tracking grams of protein and carbohydrate in diet.  Web-based support information and apps for smart phones and computers given.        Nutrition Goals   Protein goal:  grams per day in three regular balanced meals and two to three high protein snacks each day, to ensure desired weight loss.   Carbohydrate goal:  100-140 grams per day  Beverage goal: Appropriate non-carbonated beverage intake.  Patient to wean self off of any sweet beverages, including soda.    Exercise Goals  Continue current exercise routine, if appropriate, and obtain approval from caregiver if physically limited for any reason.   Start activity plan per PCP/specialist advice if not currently exercising.     Recommend that team proceed with surgery and follow per protocol.   Anna Etienne RD  10/10/2023  08:43 EDT

## 2023-10-16 ENCOUNTER — OFFICE VISIT (OUTPATIENT)
Dept: CARDIOLOGY | Facility: CLINIC | Age: 43
End: 2023-10-16
Payer: MEDICAID

## 2023-10-16 VITALS
WEIGHT: 242 LBS | HEIGHT: 62 IN | DIASTOLIC BLOOD PRESSURE: 74 MMHG | BODY MASS INDEX: 44.53 KG/M2 | OXYGEN SATURATION: 97 % | SYSTOLIC BLOOD PRESSURE: 124 MMHG | HEART RATE: 64 BPM

## 2023-10-16 DIAGNOSIS — E78.5 HYPERLIPIDEMIA, UNSPECIFIED HYPERLIPIDEMIA TYPE: Primary | ICD-10-CM

## 2023-10-16 PROCEDURE — 99204 OFFICE O/P NEW MOD 45 MIN: CPT | Performed by: INTERNAL MEDICINE

## 2023-10-16 PROCEDURE — 93000 ELECTROCARDIOGRAM COMPLETE: CPT | Performed by: INTERNAL MEDICINE

## 2023-10-16 RX ORDER — BLOOD SUGAR DIAGNOSTIC
STRIP MISCELLANEOUS
COMMUNITY
Start: 2023-10-06

## 2023-10-16 RX ORDER — FLUTICASONE PROPIONATE 50 MCG
SPRAY, SUSPENSION (ML) NASAL
COMMUNITY
Start: 2023-10-06

## 2023-10-16 NOTE — PROGRESS NOTES
"Chief Complaint  Shortness of Breath and Surgical Clearance      Subjective   History of Present Illness    Problem List  -bariatric surgery preop  -HLD  -EKG, RBBB    Ms. Muir is a 43 year old woman being seen for the above.  Not having any current CV symptoms.  Remote hx of chest pain.  Able to walk without issues and do stairs without issues.  ROS negative except for the above.       Objective   Vital Signs:  Vitals:    10/16/23 1107   BP: 124/74   Pulse: 64   SpO2: 97%     Estimated body mass index is 44.26 kg/m² as calculated from the following:    Height as of this encounter: 157.5 cm (62\").    Weight as of this encounter: 110 kg (242 lb).       Physical Exam  HENT:      Head: Normocephalic.   Eyes:      Extraocular Movements: Extraocular movements intact.   Cardiovascular:      Rate and Rhythm: Normal rate and regular rhythm.      Heart sounds: No murmur heard.     No gallop.   Pulmonary:      Breath sounds: Normal breath sounds.   Abdominal:      Palpations: Abdomen is soft.   Musculoskeletal:      Right lower leg: No edema.      Left lower leg: No edema.   Skin:     General: Skin is warm and dry.   Neurological:      General: No focal deficit present.      Mental Status: She is alert.   Psychiatric:         Mood and Affect: Mood normal.           ECG 12 Lead    Date/Time: 10/16/2023 11:21 AM  Performed by: Rajiv Ingram MD    Authorized by: Rajiv Ingram MD  Rhythm: sinus rhythm  Conduction: right bundle branch block           Assessment   -bariatric surgery preop  -HLD  -EKG, RBBB    Plan   -can proceed with bariatric surgery without further CV evaluation  -CT calcium score    Return in about 6 months (around 4/16/2024).  Rajiv Ingram MD  10/16/2023 11:21 EDT     "

## 2023-10-17 ENCOUNTER — LAB (OUTPATIENT)
Dept: LAB | Facility: HOSPITAL | Age: 43
End: 2023-10-17
Payer: MEDICAID

## 2023-10-17 DIAGNOSIS — E11.9 TYPE 2 DIABETES MELLITUS WITHOUT COMPLICATION, WITHOUT LONG-TERM CURRENT USE OF INSULIN: ICD-10-CM

## 2023-10-17 DIAGNOSIS — G47.33 OSA ON CPAP: ICD-10-CM

## 2023-10-17 DIAGNOSIS — K21.9 GASTROESOPHAGEAL REFLUX DISEASE, UNSPECIFIED WHETHER ESOPHAGITIS PRESENT: ICD-10-CM

## 2023-10-17 DIAGNOSIS — R53.82 CHRONIC FATIGUE: ICD-10-CM

## 2023-10-17 DIAGNOSIS — J45.909 ASTHMA, UNSPECIFIED ASTHMA SEVERITY, UNSPECIFIED WHETHER COMPLICATED, UNSPECIFIED WHETHER PERSISTENT: ICD-10-CM

## 2023-10-17 DIAGNOSIS — E66.01 OBESITY, CLASS III, BMI 40-49.9 (MORBID OBESITY): ICD-10-CM

## 2023-10-17 PROCEDURE — 83013 H PYLORI (C-13) BREATH: CPT

## 2023-10-19 LAB — UREA BREATH TEST QL: POSITIVE

## 2023-10-30 ENCOUNTER — OFFICE VISIT (OUTPATIENT)
Dept: PULMONOLOGY | Facility: CLINIC | Age: 43
End: 2023-10-30
Payer: MEDICAID

## 2023-10-30 VITALS
TEMPERATURE: 98.4 F | HEIGHT: 62 IN | BODY MASS INDEX: 43.98 KG/M2 | HEART RATE: 66 BPM | SYSTOLIC BLOOD PRESSURE: 114 MMHG | OXYGEN SATURATION: 100 % | WEIGHT: 239 LBS | DIASTOLIC BLOOD PRESSURE: 62 MMHG

## 2023-10-30 DIAGNOSIS — Z01.818 PREOPERATIVE CLEARANCE: Primary | ICD-10-CM

## 2023-10-30 DIAGNOSIS — G47.33 OSA ON CPAP: ICD-10-CM

## 2023-10-30 DIAGNOSIS — J45.20 MILD INTERMITTENT ASTHMA WITHOUT COMPLICATION: ICD-10-CM

## 2023-10-30 PROCEDURE — 1159F MED LIST DOCD IN RCRD: CPT | Performed by: NURSE PRACTITIONER

## 2023-10-30 PROCEDURE — 99214 OFFICE O/P EST MOD 30 MIN: CPT | Performed by: NURSE PRACTITIONER

## 2023-10-30 PROCEDURE — 1160F RVW MEDS BY RX/DR IN RCRD: CPT | Performed by: NURSE PRACTITIONER

## 2023-10-30 NOTE — PROGRESS NOTES
"[facility] Pulmonary Evaluation    Chief Complaint  Preoperative clearance (New patient)    Referring Provider:     Kamari          Merle Muir presents to [facility] for pulmonary preoperative evaluation for bariatric surgery.    She does have a history of asthma.  She takes Dulera twice daily and albuterol as needed.  She denies any significant shortness of breath, cough or wheezing.  She denies any recent acute exacerbations or illnesses.  She has very rare use of steroids.  She does occasionally have some wheezing at night when she tries to lie down.      She was seen in 2020 by Dr. Sharma for sleep medicine.  He recommended a sleep study for evaluation of obstructive sleep apnea.  She did get the sleep study with her primary care provider, and has been on a CPAP for about a year, sees her PCP and neurology for supplies.       Objective     Vital Signs:   /62 (BP Location: Left arm, Patient Position: Sitting, Cuff Size: Adult)   Pulse 66   Temp 98.4 °F (36.9 °C)   Ht 157.5 cm (62\")   Wt 108 kg (239 lb)   SpO2 100% Comment: Room air at rest  BMI 43.71 kg/m²       Immunization History   Administered Date(s) Administered    COVID-19 (ASTRAZENECA) 04/07/2021    COVID-19 (PFIZER) Purple Cap Monovalent 12/09/2021    COVID-19 (UNSPECIFIED) 03/10/2021    Flu Vaccine Intradermal Quad 18-64YR 11/08/2012, 01/03/2014    MMR 01/10/1994       Physical Exam  Vitals reviewed.   Constitutional:       General: She is not in acute distress.     Appearance: She is well-developed.   HENT:      Head: Normocephalic and atraumatic.   Eyes:      Pupils: Pupils are equal, round, and reactive to light.   Cardiovascular:      Rate and Rhythm: Normal rate and regular rhythm.      Heart sounds: No murmur heard.  Pulmonary:      Effort: Pulmonary effort is normal. No respiratory distress.      Breath sounds: Normal breath sounds. No wheezing or rales.   Abdominal:      " General: Bowel sounds are normal. There is no distension.      Palpations: Abdomen is soft.   Musculoskeletal:         General: Normal range of motion.      Cervical back: Normal range of motion and neck supple.   Skin:     General: Skin is warm and dry.      Findings: No erythema.   Neurological:      Mental Status: She is alert and oriented to person, place, and time.   Psychiatric:         Behavior: Behavior normal.          Result Review :            PFTs done in the office today:  No obstruction or restriction FVC is 2.99, 90% predicted normal DLCO.      PA and lateral chest x-ray done the office today reviewed by me  Awaiting final MD interpretation                 Assessment and Plan    Problem List Items Addressed This Visit          Pulmonary and Pneumonias    Mild intermittent asthma without complication       Sleep    BALAJI on CPAP     Other Visit Diagnoses       Preoperative clearance    -  Primary    Relevant Orders    XR Chest PA & Lateral            Mrs. Muir does have history of asthma but it seems to be well controlled at this time.  We went over her pulmonary function test which were normal.  Continue on her Dulera twice daily.    Continue on her CPAP for underlying struct of sleep apnea.    She can follow-up here in the office as needed.    ARISCAT Preoperative Pulmonary Risk Index.    Age [x]   <= 50 years old (0 points)    []   51-80 years old (3 points)    []   >80 years old (16 points)       Preoperative Oxygen Saturation [x]   >= 96% (0 points)    []   91-95% (8 points)    []   <= 90% (24 points)       Other Clinical Risk Factors []   Respiratory Infection in the last month (17 points)    []   Pre-operative anemia: Hb < 10 g/dL (11 points)    []   Emergency Surgery (8 points)       Surgical Incision [x]   Upper abdominal (15 points)    []   Intrathoracic (24 points)       Duration of Surgery [x]   < 2 hours (0 points)    []   2-3 hours (16 points)    []   >3 hours (23 points)       Total  Criteria Point Count: 15     ARISCAT risk index interpretation:      0-25 points: Low risk  1.6 % pulmonary complication rate.   26-44 points: Intermediate risk 13.3% pulmonary complication rate.    points: High risk 42.1 % pulmonary complication rate.     Postoperative Pulmonary Complications include but are not limited to: Respiratory Failure, Pulmonary Infection, Pleural Effusion, Atelectasis, Pneumothorax Bronchospasm, Aspiration Pneumonia.        I spent 32 minutes caring for Merle on this date of service. This time includes time spent by me in the following activities:preparing for the visit, reviewing tests, obtaining and/or reviewing a separately obtained history, performing a medically appropriate examination and/or evaluation , counseling and educating the patient/family/caregiver, ordering medications, tests, or procedures, referring and communicating with other health care professionals , documenting information in the medical record, and independently interpreting results and communicating that information with the patient/family/caregiver    Follow Up     No follow-ups on file.  Patient was given instructions and counseling regarding her condition or for health maintenance advice. Please see specific information pulled into the AVS if appropriate.     OFE Onofre, ACNP  [facility]  Electronically signed

## 2023-11-03 ENCOUNTER — PATIENT ROUNDING (BHMG ONLY) (OUTPATIENT)
Dept: PULMONOLOGY | Facility: CLINIC | Age: 43
End: 2023-11-03
Payer: MEDICAID

## 2023-11-03 NOTE — PROGRESS NOTES
November 3, 2023    Hello, may I speak with Merle Muir?    My name is LEONIDES      I am  with [facility]  [address] [place] [id number] [phone].    Before we get started may I verify your date of birth? 1980    I am calling to officially welcome you to our practice and ask about your recent visit. Is this a good time to talk? yes    Tell me about your visit with us. What things went well?  YES THINGS WENT WELL       We're always looking for ways to make our patients' experiences even better. Do you have recommendations on ways we may improve?  no    Overall were you satisfied with your first visit to our practice? yes       I appreciate you taking the time to speak with me today. Is there anything else I can do for you? no      Thank you, and have a great day.

## 2024-01-29 ENCOUNTER — LAB (OUTPATIENT)
Dept: LAB | Facility: HOSPITAL | Age: 44
End: 2024-01-29
Payer: MEDICAID

## 2024-01-29 ENCOUNTER — HOSPITAL ENCOUNTER (OUTPATIENT)
Dept: NUCLEAR MEDICINE | Facility: HOSPITAL | Age: 44
Discharge: HOME | End: 2024-01-29
Payer: MEDICAID

## 2024-01-29 DIAGNOSIS — E11.9 TYPE 2 DIABETES MELLITUS WITHOUT COMPLICATION, WITHOUT LONG-TERM CURRENT USE OF INSULIN: ICD-10-CM

## 2024-01-29 DIAGNOSIS — A04.8 H. PYLORI INFECTION: ICD-10-CM

## 2024-01-29 PROCEDURE — 83013 H PYLORI (C-13) BREATH: CPT

## 2024-01-29 PROCEDURE — 78264 GASTRIC EMPTYING IMG STUDY: CPT

## 2024-01-29 PROCEDURE — A9541 TC99M SULFUR COLLOID: HCPCS | Performed by: PHYSICIAN ASSISTANT

## 2024-01-29 PROCEDURE — 0 TECHNETIUM SULFUR COLLOID: Performed by: PHYSICIAN ASSISTANT

## 2024-01-29 RX ADMIN — TECHNETIUM TC 99M SULFUR COLLOID 1 DOSE: KIT at 09:21

## 2024-01-31 DIAGNOSIS — A04.8 H. PYLORI INFECTION: Primary | ICD-10-CM

## 2024-01-31 LAB — UREA BREATH TEST QL: POSITIVE

## 2024-02-05 ENCOUNTER — OFFICE VISIT (OUTPATIENT)
Dept: BEHAVIORAL HEALTH | Facility: CLINIC | Age: 44
End: 2024-02-05
Payer: MEDICAID

## 2024-02-05 DIAGNOSIS — F43.10 POST TRAUMATIC STRESS DISORDER (PTSD): ICD-10-CM

## 2024-02-05 DIAGNOSIS — E66.01 MORBID OBESITY WITH BMI OF 40.0-44.9, ADULT: Primary | ICD-10-CM

## 2024-02-05 DIAGNOSIS — Z71.89 ENCOUNTER FOR PSYCHOLOGICAL ASSESSMENT PRIOR TO BARIATRIC SURGERY: ICD-10-CM

## 2024-02-05 DIAGNOSIS — Z62.819 HISTORY OF ABUSE IN CHILDHOOD: ICD-10-CM

## 2024-02-05 PROCEDURE — 1159F MED LIST DOCD IN RCRD: CPT | Performed by: PSYCHOLOGIST

## 2024-02-05 PROCEDURE — 90791 PSYCH DIAGNOSTIC EVALUATION: CPT | Performed by: PSYCHOLOGIST

## 2024-02-05 PROCEDURE — 1160F RVW MEDS BY RX/DR IN RCRD: CPT | Performed by: PSYCHOLOGIST
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
## 2024-03-26 ENCOUNTER — OFFICE VISIT (OUTPATIENT)
Dept: GASTROENTEROLOGY | Facility: CLINIC | Age: 44
End: 2024-03-26
Payer: MEDICAID

## 2024-03-26 VITALS
TEMPERATURE: 97.5 F | HEART RATE: 76 BPM | WEIGHT: 245 LBS | BODY MASS INDEX: 45.08 KG/M2 | SYSTOLIC BLOOD PRESSURE: 124 MMHG | DIASTOLIC BLOOD PRESSURE: 76 MMHG | OXYGEN SATURATION: 99 % | HEIGHT: 62 IN

## 2024-03-26 DIAGNOSIS — K29.70 HELICOBACTER PYLORI GASTRITIS: Primary | ICD-10-CM

## 2024-03-26 DIAGNOSIS — R10.13 DYSPEPSIA: ICD-10-CM

## 2024-03-26 DIAGNOSIS — B96.81 HELICOBACTER PYLORI GASTRITIS: Primary | ICD-10-CM

## 2024-03-26 PROCEDURE — 99214 OFFICE O/P EST MOD 30 MIN: CPT | Performed by: NURSE PRACTITIONER

## 2024-03-26 PROCEDURE — 1159F MED LIST DOCD IN RCRD: CPT | Performed by: NURSE PRACTITIONER

## 2024-03-26 PROCEDURE — 1160F RVW MEDS BY RX/DR IN RCRD: CPT | Performed by: NURSE PRACTITIONER

## 2024-03-26 RX ORDER — AMOXICILLIN 500 MG/1
1000 CAPSULE ORAL 2 TIMES DAILY
Qty: 56 CAPSULE | Refills: 0 | Status: SHIPPED | OUTPATIENT
Start: 2024-03-26

## 2024-03-26 RX ORDER — OMEPRAZOLE 20 MG/1
20 CAPSULE, DELAYED RELEASE ORAL 2 TIMES DAILY
Qty: 28 CAPSULE | Refills: 0 | Status: SHIPPED | OUTPATIENT
Start: 2024-03-26 | End: 2024-04-09

## 2024-03-26 RX ORDER — LEVOFLOXACIN 500 MG/1
500 TABLET, FILM COATED ORAL DAILY
Qty: 14 TABLET | Refills: 0 | Status: SHIPPED | OUTPATIENT
Start: 2024-03-26

## 2024-03-26 NOTE — PROGRESS NOTES
Follow Up      Patient Name: Merle Muir  : 1980   MRN: [id number]     Chief Complaint:    Chief Complaint   Patient presents with    H. Pylori       History of Present Illness: Merle Muir is a 43 y.o. female who is here today for follow up on h pylori    Merle is here today for H. pylori.  Her positive H. pylori status is keeping her from pursuing bariatric surgery.  She has been treated twice (maybe 3 times- she is unsure) for this problem with Prevpac and remains positive.  She is not on any immunosuppressants.  She does complete antibiotics as prescribed.  Currently not taking colestipol.     abd surgical history includes merry, and exporatory laparotomy, c section, e-stem for urinary incontinence     Subjective      Review of Systems:   Review of Systems   Constitutional:  Negative for appetite change and unexpected weight loss.   HENT:  Negative for trouble swallowing.    Gastrointestinal:  Positive for abdominal distention, abdominal pain, constipation, diarrhea, nausea and indigestion. Negative for anal bleeding, blood in stool, rectal pain, vomiting and GERD.       Medications:     Current Outpatient Medications:     Acetaminophen 500 MG capsule, Take  by mouth Every 4 (Four) Hours As Needed., Disp: , Rfl:     albuterol sulfate  (90 Base) MCG/ACT inhaler, albuterol sulfate HFA 90 mcg/actuation aerosol inhaler, Disp: , Rfl:     Cholecalciferol (VITAMIN D3) 50 MCG (2000 UT) capsule, Vitamin D3 50 mcg (2,000 unit) capsule  2 capsules daily OTC, Disp: , Rfl:     colestipol (COLESTID) 1 g tablet, Take 1 tablet by mouth 2 (Two) Times a Day., Disp: 60 tablet, Rfl: 2    cyclobenzaprine (FLEXERIL) 10 MG tablet, Take 1 tablet by mouth 3 (Three) Times a Day As Needed., Disp: , Rfl:     fluticasone (FLONASE) 50 MCG/ACT nasal spray, , Disp: , Rfl:     hydrOXYzine pamoate (VISTARIL) 25 MG capsule, Take 1 capsule by mouth 3 (Three) Times a Day As Needed., Disp: , Rfl:      ipratropium (ATROVENT) 0.03 % nasal spray, ipratropium bromide 0.03 % nasal spray, Disp: , Rfl:     levETIRAcetam (KEPPRA) 750 MG tablet, Take 1 tablet by mouth 2 (Two) Times a Day., Disp: , Rfl:     Melatonin 10 MG tablet, Every Night., Disp: , Rfl:     methocarbamol (ROBAXIN) 500 MG tablet, Take 1 tablet by mouth 3 (Three) Times a Day., Disp: , Rfl:     mometasone-formoterol (DULERA 100) 100-5 MCG/ACT inhaler, Inhale 2 puffs 2 (Two) Times a Day., Disp: , Rfl:     naproxen (NAPROSYN) 500 MG tablet, Take 1 tablet by mouth 2 (Two) Times a Day With Meals., Disp: , Rfl:     omeprazole (priLOSEC) 20 MG capsule, Take 1 capsule by mouth 2 (Two) Times a Day for 14 days., Disp: 28 capsule, Rfl: 0    ondansetron ODT (ZOFRAN-ODT) 4 MG disintegrating tablet, Place 1 tablet 3 times a day by translingual route as needed for 2 days., Disp: , Rfl:     OneTouch Ultra test strip, , Disp: , Rfl:     prazosin (MINIPRESS) 5 MG capsule, Take 1 capsule by mouth Every Night., Disp: , Rfl:     pregabalin (LYRICA) 50 MG capsule, pregabalin 50 mg capsule, Disp: , Rfl:     Semaglutide, 1 MG/DOSE, (Ozempic, 1 MG/DOSE,) 2 MG/1.5ML solution pen-injector, 1 mg 1 (One) Time Per Week., Disp: , Rfl:     sertraline (ZOLOFT) 50 MG tablet, Take 1 tablet by mouth Daily., Disp: , Rfl:     Vraylar 3 MG capsule capsule, Take 1 capsule every day by oral route for 90 days., Disp: , Rfl:     amoxicillin (AMOXIL) 500 MG capsule, Take 2 capsules by mouth 2 (Two) Times a Day., Disp: 56 capsule, Rfl: 0    levoFLOXacin (Levaquin) 500 MG tablet, Take 1 tablet by mouth Daily., Disp: 14 tablet, Rfl: 0    Allergies:   Allergies   Allergen Reactions    Minocycline Other (See Comments)     Renal failure    Rifampin Other (See Comments)     Renal failure    Sudafed [Pseudoephedrine Hcl] Other (See Comments)     Pt states it shuts her body down    Asa [Aspirin] Other (See Comments)     Unknown    Lactose Intolerance (Gi) GI Intolerance       Social History:   Social  "History     Socioeconomic History    Marital status: Single   Tobacco Use    Smoking status: Never     Passive exposure: Never    Smokeless tobacco: Never   Vaping Use    Vaping status: Never Used   Substance and Sexual Activity    Alcohol use: Yes     Comment: once a month    Drug use: Never    Sexual activity: Yes     Partners: Male     Birth control/protection: None        Surgical History:   Past Surgical History:   Procedure Laterality Date     SECTION      lower transverse    CRANIAL NEUROSTIMULATOR INSERTION/REPLACEMENT      battery replaced in     DIAGNOSTIC LAPAROSCOPY      ovarian cysts    ENDOSCOPY AND COLONOSCOPY      LAPAROSCOPIC CHOLECYSTECTOMY      gallstones    NASAL SINUS SURGERY      TEETH EXTRACTION          Medical History:   Past Medical History:   Diagnosis Date    Anxiety and depression     Asthma     Back pain     Naproxen and Flexeril; hx of steroid injections    Chest pain     Cholelithiasis     Gallbladder been removed due to infection    Epilepsy     Keppra; has neurotransmiter in place. Dr. Deleon    Fibromyalgia     GERD (gastroesophageal reflux disease)     Omeprazole; EGD in ; hx of h pylori    History of Helicobacter pylori infection     s/p treatment    History of renal insufficiency     History of suicide attempt     at age 18 and 20    Lactose intolerance     I think so not sure    Nausea     chronic    BALAJI on CPAP     PTSD (post-traumatic stress disorder)     Type 2 diabetes mellitus     dx in ; no insulin; last a1c 7    Urinary incontinence         Objective     Physical Exam:  Vital Signs:   Vitals:    24 0845   BP: 124/76   Pulse: 76   Temp: 97.5 °F (36.4 °C)   SpO2: 99%   Weight: 111 kg (245 lb)   Height: 157.5 cm (62.01\")     Body mass index is 44.8 kg/m².     Physical Exam  Constitutional:       General: She is not in acute distress.     Appearance: She is well-developed.   Pulmonary:      Effort: Pulmonary effort is normal. No " accessory muscle usage or respiratory distress.   Abdominal:      General: Bowel sounds are normal. There is no distension.      Palpations: Abdomen is soft.   Skin:     Coloration: Skin is not pale.      Findings: No erythema.   Neurological:      Mental Status: She is alert and oriented to person, place, and time.   Psychiatric:         Speech: Speech normal.         Behavior: Behavior normal.         Thought Content: Thought content normal.         Judgment: Judgment normal.         Assessment / Plan      Assessment/Plan:   Diagnoses and all orders for this visit:    1. Helicobacter pylori gastritis (Primary)  -     omeprazole (priLOSEC) 20 MG capsule; Take 1 capsule by mouth 2 (Two) Times a Day for 14 days.  Dispense: 28 capsule; Refill: 0  -     levoFLOXacin (Levaquin) 500 MG tablet; Take 1 tablet by mouth Daily.  Dispense: 14 tablet; Refill: 0  -     amoxicillin (AMOXIL) 500 MG capsule; Take 2 capsules by mouth 2 (Two) Times a Day.  Dispense: 56 capsule; Refill: 0  -     H. Pylori Antigen, Stool - Stool, Per Rectum  -     Ambulatory Referral to Infectious Disease    2. Dyspepsia    Will treat with salvage Levaquin therapy.  Discussed side effects of Levaquin-i.e. risk of tendon rupture    She will complete this and repeat a stool antigen in 4 weeks.  If this remains positive, she will follow with infectious disease.  Will go ahead and place his referral to avoid further delay in her bariatric surgery.  Advised not to schedule until after her treatment and stool test have a chance to be completed    Follow Up:   No follow-ups on file.    Plan of care reviewed with the patient at the conclusion of today's visit.  Education was provided regarding diagnosis, management, and any prescribed or recommended OTC medications.  Patient verbalized understanding of and agreement with management plan.       OFE Clark  [facility]

## 2024-05-08 ENCOUNTER — LAB (OUTPATIENT)
Dept: LAB | Facility: HOSPITAL | Age: 44
End: 2024-05-08
Payer: MEDICAID

## 2024-05-08 PROCEDURE — 87338 HPYLORI STOOL AG IA: CPT | Performed by: NURSE PRACTITIONER

## 2024-05-10 LAB — H PYLORI AG STL QL IA: NEGATIVE

## 2024-05-15 DIAGNOSIS — R53.83 FATIGUE, UNSPECIFIED TYPE: Primary | ICD-10-CM

## 2024-05-15 DIAGNOSIS — R06.00 DYSPNEA, UNSPECIFIED TYPE: ICD-10-CM

## 2024-06-11 DIAGNOSIS — R06.00 DYSPNEA, UNSPECIFIED TYPE: ICD-10-CM

## 2024-06-11 DIAGNOSIS — R53.83 FATIGUE, UNSPECIFIED TYPE: ICD-10-CM

## 2024-06-24 ENCOUNTER — CONSULT (OUTPATIENT)
Dept: BARIATRICS/WEIGHT MGMT | Facility: CLINIC | Age: 44
End: 2024-06-24
Payer: MEDICAID

## 2024-06-24 VITALS
TEMPERATURE: 97.7 F | BODY MASS INDEX: 45.82 KG/M2 | HEART RATE: 89 BPM | OXYGEN SATURATION: 97 % | HEIGHT: 62 IN | SYSTOLIC BLOOD PRESSURE: 136 MMHG | DIASTOLIC BLOOD PRESSURE: 86 MMHG | WEIGHT: 249 LBS | RESPIRATION RATE: 18 BRPM

## 2024-06-24 DIAGNOSIS — K21.9 GASTROESOPHAGEAL REFLUX DISEASE, UNSPECIFIED WHETHER ESOPHAGITIS PRESENT: ICD-10-CM

## 2024-06-24 DIAGNOSIS — G47.33 OSA ON CPAP: ICD-10-CM

## 2024-06-24 DIAGNOSIS — E66.01 OBESITY, CLASS III, BMI 40-49.9 (MORBID OBESITY): Primary | ICD-10-CM

## 2024-06-24 DIAGNOSIS — F32.A ANXIETY AND DEPRESSION: ICD-10-CM

## 2024-06-24 DIAGNOSIS — E11.9 TYPE 2 DIABETES MELLITUS WITHOUT COMPLICATION, WITHOUT LONG-TERM CURRENT USE OF INSULIN: ICD-10-CM

## 2024-06-24 DIAGNOSIS — M54.9 BACK PAIN, UNSPECIFIED BACK LOCATION, UNSPECIFIED BACK PAIN LATERALITY, UNSPECIFIED CHRONICITY: ICD-10-CM

## 2024-06-24 DIAGNOSIS — F41.9 ANXIETY AND DEPRESSION: ICD-10-CM

## 2024-06-24 DIAGNOSIS — J45.20 MILD INTERMITTENT ASTHMA WITHOUT COMPLICATION: ICD-10-CM

## 2024-06-24 PROCEDURE — 99215 OFFICE O/P EST HI 40 MIN: CPT | Performed by: SURGERY

## 2024-06-24 RX ORDER — ENOXAPARIN SODIUM 100 MG/ML
40 INJECTION SUBCUTANEOUS ONCE
OUTPATIENT
Start: 2024-06-24 | End: 2024-06-24

## 2024-06-24 RX ORDER — MELOXICAM 15 MG/1
15 TABLET ORAL DAILY
COMMUNITY

## 2024-06-24 RX ORDER — ACETAMINOPHEN 500 MG
1000 TABLET ORAL ONCE
OUTPATIENT
Start: 2024-06-24 | End: 2024-06-24

## 2024-06-24 RX ORDER — GABAPENTIN 100 MG/1
600 CAPSULE ORAL ONCE
OUTPATIENT
Start: 2024-06-24 | End: 2024-06-24

## 2024-06-24 RX ORDER — GLIPIZIDE 5 MG/1
5 TABLET, FILM COATED, EXTENDED RELEASE ORAL 2 TIMES DAILY
COMMUNITY

## 2024-06-24 RX ORDER — OMEPRAZOLE 20 MG/1
20 CAPSULE, DELAYED RELEASE ORAL DAILY
COMMUNITY

## 2024-06-24 RX ORDER — PANTOPRAZOLE SODIUM 40 MG/10ML
40 INJECTION, POWDER, LYOPHILIZED, FOR SOLUTION INTRAVENOUS ONCE
OUTPATIENT
Start: 2024-06-24 | End: 2024-06-24

## 2024-06-24 RX ORDER — INSULIN GLARGINE 100 [IU]/ML
10 INJECTION, SOLUTION SUBCUTANEOUS DAILY
COMMUNITY

## 2024-06-24 RX ORDER — SCOLOPAMINE TRANSDERMAL SYSTEM 1 MG/1
1 PATCH, EXTENDED RELEASE TRANSDERMAL ONCE
OUTPATIENT
Start: 2024-06-24 | End: 2024-06-24

## 2024-06-24 RX ORDER — SODIUM CHLORIDE 9 MG/ML
150 INJECTION, SOLUTION INTRAVENOUS CONTINUOUS
OUTPATIENT
Start: 2024-06-24

## 2024-06-24 RX ORDER — CHLORHEXIDINE GLUCONATE ORAL RINSE 1.2 MG/ML
15 SOLUTION DENTAL
OUTPATIENT
Start: 2024-06-24 | End: 2024-06-24

## 2024-06-24 NOTE — H&P (VIEW-ONLY)
"[facility]IATR[facility] SURGERY  [address]  [address]  [facility] [id number] [phone]      Patient  Name:  Merle Muir  :  1980      Date of Visit: 2024      Chief Complaint:  weight gain; unable to maintain weight loss    History of Present Illness:  Merle Muir is a 43 y.o. female who presents today for evaluation, education and consultation regarding weight loss surgery.     Patient has been overweight for many years, with numerous failed dietary/weight loss attempts.  She now has obesity related comorbidities of asthma, back pain, depression, GERD, BALAJI, DM2, urinary incontinence and as such has decided to pursue weight loss surgery.    From intake:  \"GI: GERD treated with daily Prilosec. EGD in . Hx of h pylori. S/p lap merry in  for gallstones. Reports history of chronic nausea.     History of epilepsy on Keppra and s/p neurostimulator. Follows with Dr. Deleon.     Type 2 diabetic-dx in . A1c is 7. Currently on Ozempic. No hx of insulin use.     Other PMHx significant for BALAJI on CPAP, asthma, anxiety/depression, PTSD, hx of suicide attempt, chronic back pain.     \"    2024 Update:    Since LOV, started on 10 U QHS Lantus.  Also on Ozempic and glipizide.  Reports random BG running 300s.  Has improved BG.  Last A1C was improving from 9.    Epilepsy.  Last seizure was a minor one (absence) yesterday.  Has also had grand mal in the past.      The patient lives in [place], [place], and she babysits intermittently.  She is disabled for seizures, chronic pain, PTSD.  Cranial neurotransmitter (implanted in left chest).  Takes Keppra, and in hospital has required benzos to abort seizure.  Last hospitalized for grand mal seizure 1 year go.  Does not drive d/t seizures.      No personal or family hx of VTE or clotting d/o.  No liver, lung, heart, or renal disease        Review of data:    XAVIER: pregabalin monthly  CBC: normal  CMP: " normal  HP negative after treatment.  Took several rounds to clear HP.       EGD:   2022 Dr. Ott  Portal hypertensive gastropathy, LA grade A esophagitis, diverticulum in middle 1/3 of esophagus  No mention of HH.    Path  DIAGNOSIS:  A.   DUODENUM, BIOPSY, SECOND PORTION:  Duodenal type mucosa with no significant histopathologic abnormalities  B.   ANTRUM, BIOPSY:  H. pylori gastritis  Negative for intestinal metaplasia or dysplasia  C.   GASTRIC BIOPSY, BODY:  H. pylori gastritis  Negative for intestinal metaplasia or dysplasia  D.   ESOPHAGUS, BIOPSY, DISTAL:  Squamous mucosa with features suggestive of reflux esophagitis (no  eosinophils seen)  Glandular mucosa with mild chronic inflammation  Negative for intestinal metaplasia or dysplasia  Negative for specific microorganisms      GES: normal    Cardiac clearance: cleared without further testing     Neurology clearance: cleared for bariatric surgery re: epilepsy    Pulm clearance: low risk        Last tobacco: never  Last NSAIDs: Mobic yesterday, toradol shot 3 weeks ago.  Last ASA: n/a  Last steroids: n/a  Last hormones: remote  Ozempic: 10 days ago.  Did not take last week's dose d/t nausea.         Past Medical History:   Diagnosis Date   • Anxiety and depression    • Asthma    • Back pain     Naproxen and Flexeril; hx of steroid injections   • Chest pain    • Cholelithiasis     Gallbladder been removed due to infection   • Epilepsy     Keppra; has neurotransmiter in place. Dr. Deleon   • Fibromyalgia    • GERD (gastroesophageal reflux disease)     Omeprazole; EGD in 2022; hx of h pylori   • History of Helicobacter pylori infection     s/p treatment   • History of renal insufficiency    • History of suicide attempt     at age 18 and 20   • HLD (hyperlipidemia)    • Lactose intolerance     I think so not sure   • Nausea     chronic   • BALAJI on CPAP    • PTSD (post-traumatic stress disorder)    • Right bundle branch block    • Type 2 diabetes mellitus      dx in ; no insulin; last a1c 7   • Urinary incontinence     s/p bladder stimulator     Past Surgical History:   Procedure Laterality Date   • BLADDER SURGERY      stimulator   •  SECTION      lower transverse   • CRANIAL NEUROSTIMULATOR INSERTION/REPLACEMENT      battery replaced in    • DIAGNOSTIC LAPAROSCOPY      ovarian cysts   • ENDOSCOPY AND COLONOSCOPY     • LAPAROSCOPIC CHOLECYSTECTOMY      gallstones   • NASAL SINUS SURGERY     • TEETH EXTRACTION         Allergies   Allergen Reactions   • Minocycline Other (See Comments)     Renal failure   • Rifampin Other (See Comments)     Renal failure   • Sudafed [Pseudoephedrine Hcl] Other (See Comments)     Pt states it shuts her body down   • Asa [Aspirin] Other (See Comments)     Unknown   • Lactose Intolerance (Gi) GI Intolerance       Current Outpatient Medications:   •  Acetaminophen 500 MG capsule, Take  by mouth Every 4 (Four) Hours As Needed., Disp: , Rfl:   •  albuterol sulfate  (90 Base) MCG/ACT inhaler, albuterol sulfate HFA 90 mcg/actuation aerosol inhaler, Disp: , Rfl:   •  Cholecalciferol (VITAMIN D3) 50 MCG (2000 UT) capsule, Vitamin D3 50 mcg (2,000 unit) capsule  2 capsules daily OTC, Disp: , Rfl:   •  colestipol (COLESTID) 1 g tablet, Take 1 tablet by mouth 2 (Two) Times a Day., Disp: 60 tablet, Rfl: 2  •  cyclobenzaprine (FLEXERIL) 10 MG tablet, Take 1 tablet by mouth 3 (Three) Times a Day As Needed., Disp: , Rfl:   •  fluticasone (FLONASE) 50 MCG/ACT nasal spray, , Disp: , Rfl:   •  glipizide (GLUCOTROL XL) 5 MG ER tablet, Take 1 tablet by mouth 2 (Two) Times a Day., Disp: , Rfl:   •  hydrOXYzine pamoate (VISTARIL) 25 MG capsule, Take 1 capsule by mouth 3 (Three) Times a Day As Needed., Disp: , Rfl:   •  insulin glargine (LANTUS, SEMGLEE) 100 UNIT/ML injection, Inject 10 Units under the skin into the appropriate area as directed Daily., Disp: , Rfl:   •  levETIRAcetam (KEPPRA) 750 MG tablet, Take 1 tablet  by mouth 2 (Two) Times a Day., Disp: , Rfl:   •  Melatonin 10 MG tablet, Every Night., Disp: , Rfl:   •  methocarbamol (ROBAXIN) 500 MG tablet, Take 1 tablet by mouth 3 (Three) Times a Day., Disp: , Rfl:   •  mometasone-formoterol (DULERA 100) 100-5 MCG/ACT inhaler, Inhale 2 puffs 2 (Two) Times a Day., Disp: , Rfl:   •  omeprazole (priLOSEC) 20 MG capsule, Take 1 capsule by mouth Daily., Disp: , Rfl:   •  ondansetron ODT (ZOFRAN-ODT) 4 MG disintegrating tablet, Place 1 tablet 3 times a day by translingual route as needed for 2 days., Disp: , Rfl:   •  OneTouch Ultra test strip, , Disp: , Rfl:   •  prazosin (MINIPRESS) 5 MG capsule, Take 1 capsule by mouth Every Night., Disp: , Rfl:   •  pregabalin (LYRICA) 50 MG capsule, pregabalin 50 mg capsule, Disp: , Rfl:   •  Semaglutide, 1 MG/DOSE, (Ozempic, 1 MG/DOSE,) 2 MG/1.5ML solution pen-injector, 1 mg 1 (One) Time Per Week., Disp: , Rfl:   •  sertraline (ZOLOFT) 50 MG tablet, Take 1 tablet by mouth Daily., Disp: , Rfl:   •  Vraylar 3 MG capsule capsule, Take 1 capsule every day by oral route for 90 days., Disp: , Rfl:   •  amoxicillin (AMOXIL) 500 MG capsule, Take 2 capsules by mouth 2 (Two) Times a Day. (Patient not taking: Reported on 6/24/2024), Disp: 56 capsule, Rfl: 0  •  meloxicam (MOBIC) 15 MG tablet, Take 1 tablet by mouth Daily. (Patient not taking: Reported on 6/24/2024), Disp: , Rfl:   •  naproxen (NAPROSYN) 500 MG tablet, Take 1 tablet by mouth 2 (Two) Times a Day With Meals. (Patient not taking: Reported on 6/24/2024), Disp: , Rfl:     Social History     Socioeconomic History   • Marital status: Single   Tobacco Use   • Smoking status: Never     Passive exposure: Never   • Smokeless tobacco: Never   Vaping Use   • Vaping status: Never Used   Substance and Sexual Activity   • Alcohol use: Yes     Comment: once a month   • Drug use: Never   • Sexual activity: Yes     Partners: Male     Birth control/protection: None     Social History     Social History  Narrative    Lives in [place], [place]  with 1 child and is currently disabled.         Family History   Adopted: Yes   Family history unknown: Yes       Review of Systems    Physical Exam:  Vital Signs:  Weight: 113 kg (249 lb)   Body mass index is 45.54 kg/m².  Temp: 97.7 °F (36.5 °C)   Heart Rate: 89   BP: 136/86     Physical Exam  Vitals reviewed.   Constitutional:       Appearance: She is well-developed.   HENT:      Head: Normocephalic and atraumatic.      Comments: edentulous     Nose: Nose normal.   Eyes:      Conjunctiva/sclera: Conjunctivae normal.      Pupils: Pupils are equal, round, and reactive to light.   Neck:      Thyroid: No thyromegaly.      Vascular: No carotid bruit.      Trachea: No tracheal deviation.   Cardiovascular:      Rate and Rhythm: Normal rate and regular rhythm.      Heart sounds: Normal heart sounds.   Pulmonary:      Effort: Pulmonary effort is normal. No respiratory distress.      Breath sounds: Normal breath sounds.   Abdominal:      General: There is no distension.      Palpations: Abdomen is soft.      Tenderness: There is no abdominal tenderness.      Comments: Lap scars, low transverse C section scar   Musculoskeletal:         General: No deformity. Normal range of motion.      Cervical back: Normal range of motion and neck supple.   Skin:     General: Skin is warm and dry.      Findings: No rash.   Neurological:      Mental Status: She is alert and oriented to person, place, and time.      Cranial Nerves: No cranial nerve deficit.      Coordination: Coordination normal.   Psychiatric:         Behavior: Behavior normal.         Thought Content: Thought content normal.         Judgment: Judgment normal.       Problem List Items Addressed This Visit       Type 2 diabetes mellitus    Overview     dx in 2022; no insulin; last a1c 7         Relevant Medications    glipizide (GLUCOTROL XL) 5 MG ER tablet    insulin glargine (LANTUS, SEMGLEE) 100 UNIT/ML injection    BALAJI on  CPAP    Mild intermittent asthma without complication    GERD (gastroesophageal reflux disease)    Overview     Omeprazole; EGD in 2022; hx of h pylori         Relevant Medications    omeprazole (priLOSEC) 20 MG capsule    Back pain    Overview     Naproxen and Flexeril; hx of steroid injections         Anxiety and depression     Other Visit Diagnoses       Obesity, Class III, BMI 40-49.9 (morbid obesity)    -  Primary            Assessment:    Merle Muir is a 43 y.o. year old female with medically complicated obesity.    Weight loss surgery is deemed medically necessary given the following obesity related comorbidities including asthma, back pain, depression, GERD, BALAJI, DM2, urinary incontinence with current Weight: 113 kg (249 lb) and Body mass index is 45.54 kg/m²..    Patient is aware that surgery is a tool, and that weight loss is not guaranteed but only seen in the context of appropriate use, follow up and exercise.    The patient was present for an approximately a 2.5 hour discussion of the purpose of weight loss surgery, how WLS is a tool to assist in achieving weight loss goals, the most common complications and how best to avoid them, and the strategies for short and long term weight loss.  Ample opportunity to discuss questions was available both in group and during the time of individual examination.    I reviewed all available preop labs, Xrays, tests, clearances, etc and signed off on these in the record.  All of this in addition to the patient's unique history and exam has been taken into consideration in determining their appropriate candidacy for weight loss surgery.    Complications  of laparoscopic/possible robotic gastric sleeve were discussed. The patient is well aware of the potential complications of surgery that include but not limited to bleeding, infections, deep venous thrombosis, pulmonary embolism, pulmonary complications such as pneumonia, cardiac events, hernias, small  "bowel obstruction, damage to the spleen or other organs, bowel injury, disfiguring scars, failure to lose weight, need for additional surgery, conversion to an open procedure, and death. Patient is also aware of complications which apply in this particular procedure that can include but are not limited to a \"leak\" at the staple line which in some instances may require conversion to gastric bypass.    The patient is aware if a hiatal hernia is encountered, it likely will be repaired.  R/B/A Rx to hiatal hernia repair were discussed as outlined in our long consent form.  Briefly risks in addition to those for LSG include recurrent hernia, GUY, dysphagia, esophageal injury, pneumothorax, injury to the vagus nerves, injury to the thoracic duct, aorta or vena cava.    Greater than 3 minutes was spent with the patient discussing avoiding all tobacco products and second hand smoke at least 2 weeks pre-operatively and 6 weeks post-operatively to minimize the risk of sleeve leak.  This included discussing the importance of avoiding even secondhand smoke as the risk of leak is increased.  Examples discussed:  I made it very clear that the patient understands they should avoid even riding in a car where someone has previously smoked in the last 2 weeks, living in a house where someone smokes (even if it's in a separate room/patio/attached garage, etc.) we discussed that they should not have a conversation with a group of people who are smoking even if it's outside.  They can be around wood burning fires and barbecue.  I told them I do not know if marijuana has a same effects but my overall recommendation is to avoid it for 2 weeks prior in 6 weeks after surgery.  They also are aware that nicotine may also increase the risk of leak and I strongly encouraged him to avoid that as well for 2 weeks prior in 6 weeks after surgery.    Discussed the risks, benefits and alternative therapies at great length as outlined in our extensive " consent forms, consent videos, and educational teaching process under the direction of the center's .    A copy of the patient's signed informed consent is on file.    Plan:  Laparoscopic , possible robotic assisted sleeve gastrectomy  at [facility]      R/B/A Rx discussed to postop anticoagulation including but not limited to bleeding, drug reaction, venothromboembolic events, etc. and she declined.    MDM high:  Elective procedure with the following risk factors: morbid obesity, DM2, chronic pain, PTSD, reported panic disorder in hospital, RBBB, asthma  4+ chronic medical problems reviewed.    I spent 60 minutes caring for Merle on this date of service. This time includes time spent by me in the following activities: preparing for the visit, reviewing tests, performing a medically appropriate examination and/or evaluation, counseling and educating the patient/family/caregiver, referring and communicating with other health care professionals, documenting information in the medical record, care coordination, ordering medications, ordering test(s), ordering procedure(s), obtaining a separately obtained history, and reviewing a separately obtained history.       Thank you UseryKush MD for allowing me to share in the care of our mutual patient.             Kelsey Cerna MD                                           Answers submitted by the patient for this visit:  Other (Submitted on 6/22/2024)  Please describe your symptoms.: Pretty sure this cisit was for my gastric bypass  Have you had these symptoms before?: Yes  How long have you been having these symptoms?: Greater than 2 weeks  Primary Reason for Visit (Submitted on 6/22/2024)  What is the primary reason for your visit?: Other

## 2024-06-24 NOTE — PROGRESS NOTES
"[facility] BARIATRIC SURGERY  [address]  [address]  Formerly [facility] [id number] [phone]      Patient  Name:  Merle Muir  :  1980      Date of Visit: 2024      Chief Complaint:  weight gain; unable to maintain weight loss    History of Present Illness:  Merle Muir is a 43 y.o. female who presents today for evaluation, education and consultation regarding weight loss surgery.     Patient has been overweight for many years, with numerous failed dietary/weight loss attempts.  She now has obesity related comorbidities of asthma, back pain, depression, GERD, BALAJI, DM2, urinary incontinence and as such has decided to pursue weight loss surgery.    From intake:  \"GI: GERD treated with daily Prilosec. EGD in . Hx of h pylori. S/p lap merry in  for gallstones. Reports history of chronic nausea.     History of epilepsy on Keppra and s/p neurostimulator. Follows with Dr. Deleon.     Type 2 diabetic-dx in . A1c is 7. Currently on Ozempic. No hx of insulin use.     Other PMHx significant for BALAJI on CPAP, asthma, anxiety/depression, PTSD, hx of suicide attempt, chronic back pain.     \"    2024 Update:    Since LOV, started on 10 U QHS Lantus.  Also on Ozempic and glipizide.  Reports random BG running 300s.  Has improved BG.  Last A1C was improving from 9.    Epilepsy.  Last seizure was a minor one (absence) yesterday.  Has also had grand mal in the past.      The patient lives in [place], [place], and she babysits intermittently.  She is disabled for seizures, chronic pain, PTSD.  Cranial neurotransmitter (implanted in left chest).  Takes Keppra, and in hospital has required benzos to abort seizure.  Last hospitalized for grand mal seizure 1 year go.  Does not drive d/t seizures.      No personal or family hx of VTE or clotting d/o.  No liver, lung, heart, or renal disease        Review of data:    XAVIER: pregabalin monthly  CBC: normal  CMP: " normal  HP negative after treatment.  Took several rounds to clear HP.       EGD:   2022 Dr. Ott  Portal hypertensive gastropathy, LA grade A esophagitis, diverticulum in middle 1/3 of esophagus  No mention of HH.    Path  DIAGNOSIS:  A.   DUODENUM, BIOPSY, SECOND PORTION:  Duodenal type mucosa with no significant histopathologic abnormalities  B.   ANTRUM, BIOPSY:  H. pylori gastritis  Negative for intestinal metaplasia or dysplasia  C.   GASTRIC BIOPSY, BODY:  H. pylori gastritis  Negative for intestinal metaplasia or dysplasia  D.   ESOPHAGUS, BIOPSY, DISTAL:  Squamous mucosa with features suggestive of reflux esophagitis (no  eosinophils seen)  Glandular mucosa with mild chronic inflammation  Negative for intestinal metaplasia or dysplasia  Negative for specific microorganisms      GES: normal    Cardiac clearance: cleared without further testing     Neurology clearance: cleared for bariatric surgery re: epilepsy    Pulm clearance: low risk        Last tobacco: never  Last NSAIDs: Mobic yesterday, toradol shot 3 weeks ago.  Last ASA: n/a  Last steroids: n/a  Last hormones: remote  Ozempic: 10 days ago.  Did not take last week's dose d/t nausea.         Past Medical History:   Diagnosis Date   • Anxiety and depression    • Asthma    • Back pain     Naproxen and Flexeril; hx of steroid injections   • Chest pain    • Cholelithiasis     Gallbladder been removed due to infection   • Epilepsy     Keppra; has neurotransmiter in place. Dr. Deleon   • Fibromyalgia    • GERD (gastroesophageal reflux disease)     Omeprazole; EGD in 2022; hx of h pylori   • History of Helicobacter pylori infection     s/p treatment   • History of renal insufficiency    • History of suicide attempt     at age 18 and 20   • HLD (hyperlipidemia)    • Lactose intolerance     I think so not sure   • Nausea     chronic   • BALAJI on CPAP    • PTSD (post-traumatic stress disorder)    • Right bundle branch block    • Type 2 diabetes mellitus      dx in ; no insulin; last a1c 7   • Urinary incontinence     s/p bladder stimulator     Past Surgical History:   Procedure Laterality Date   • BLADDER SURGERY      stimulator   •  SECTION      lower transverse   • CRANIAL NEUROSTIMULATOR INSERTION/REPLACEMENT      battery replaced in    • DIAGNOSTIC LAPAROSCOPY      ovarian cysts   • ENDOSCOPY AND COLONOSCOPY     • LAPAROSCOPIC CHOLECYSTECTOMY      gallstones   • NASAL SINUS SURGERY     • TEETH EXTRACTION         Allergies   Allergen Reactions   • Minocycline Other (See Comments)     Renal failure   • Rifampin Other (See Comments)     Renal failure   • Sudafed [Pseudoephedrine Hcl] Other (See Comments)     Pt states it shuts her body down   • Asa [Aspirin] Other (See Comments)     Unknown   • Lactose Intolerance (Gi) GI Intolerance       Current Outpatient Medications:   •  Acetaminophen 500 MG capsule, Take  by mouth Every 4 (Four) Hours As Needed., Disp: , Rfl:   •  albuterol sulfate  (90 Base) MCG/ACT inhaler, albuterol sulfate HFA 90 mcg/actuation aerosol inhaler, Disp: , Rfl:   •  Cholecalciferol (VITAMIN D3) 50 MCG (2000 UT) capsule, Vitamin D3 50 mcg (2,000 unit) capsule  2 capsules daily OTC, Disp: , Rfl:   •  colestipol (COLESTID) 1 g tablet, Take 1 tablet by mouth 2 (Two) Times a Day., Disp: 60 tablet, Rfl: 2  •  cyclobenzaprine (FLEXERIL) 10 MG tablet, Take 1 tablet by mouth 3 (Three) Times a Day As Needed., Disp: , Rfl:   •  fluticasone (FLONASE) 50 MCG/ACT nasal spray, , Disp: , Rfl:   •  glipizide (GLUCOTROL XL) 5 MG ER tablet, Take 1 tablet by mouth 2 (Two) Times a Day., Disp: , Rfl:   •  hydrOXYzine pamoate (VISTARIL) 25 MG capsule, Take 1 capsule by mouth 3 (Three) Times a Day As Needed., Disp: , Rfl:   •  insulin glargine (LANTUS, SEMGLEE) 100 UNIT/ML injection, Inject 10 Units under the skin into the appropriate area as directed Daily., Disp: , Rfl:   •  levETIRAcetam (KEPPRA) 750 MG tablet, Take 1 tablet  by mouth 2 (Two) Times a Day., Disp: , Rfl:   •  Melatonin 10 MG tablet, Every Night., Disp: , Rfl:   •  methocarbamol (ROBAXIN) 500 MG tablet, Take 1 tablet by mouth 3 (Three) Times a Day., Disp: , Rfl:   •  mometasone-formoterol (DULERA 100) 100-5 MCG/ACT inhaler, Inhale 2 puffs 2 (Two) Times a Day., Disp: , Rfl:   •  omeprazole (priLOSEC) 20 MG capsule, Take 1 capsule by mouth Daily., Disp: , Rfl:   •  ondansetron ODT (ZOFRAN-ODT) 4 MG disintegrating tablet, Place 1 tablet 3 times a day by translingual route as needed for 2 days., Disp: , Rfl:   •  OneTouch Ultra test strip, , Disp: , Rfl:   •  prazosin (MINIPRESS) 5 MG capsule, Take 1 capsule by mouth Every Night., Disp: , Rfl:   •  pregabalin (LYRICA) 50 MG capsule, pregabalin 50 mg capsule, Disp: , Rfl:   •  Semaglutide, 1 MG/DOSE, (Ozempic, 1 MG/DOSE,) 2 MG/1.5ML solution pen-injector, 1 mg 1 (One) Time Per Week., Disp: , Rfl:   •  sertraline (ZOLOFT) 50 MG tablet, Take 1 tablet by mouth Daily., Disp: , Rfl:   •  Vraylar 3 MG capsule capsule, Take 1 capsule every day by oral route for 90 days., Disp: , Rfl:   •  amoxicillin (AMOXIL) 500 MG capsule, Take 2 capsules by mouth 2 (Two) Times a Day. (Patient not taking: Reported on 6/24/2024), Disp: 56 capsule, Rfl: 0  •  meloxicam (MOBIC) 15 MG tablet, Take 1 tablet by mouth Daily. (Patient not taking: Reported on 6/24/2024), Disp: , Rfl:   •  naproxen (NAPROSYN) 500 MG tablet, Take 1 tablet by mouth 2 (Two) Times a Day With Meals. (Patient not taking: Reported on 6/24/2024), Disp: , Rfl:     Social History     Socioeconomic History   • Marital status: Single   Tobacco Use   • Smoking status: Never     Passive exposure: Never   • Smokeless tobacco: Never   Vaping Use   • Vaping status: Never Used   Substance and Sexual Activity   • Alcohol use: Yes     Comment: once a month   • Drug use: Never   • Sexual activity: Yes     Partners: Male     Birth control/protection: None     Social History     Social History  Narrative    Lives in [place], [place].  with 1 child and is currently disabled.         Family History   Adopted: Yes   Family history unknown: Yes       Review of Systems    Physical Exam:  Vital Signs:  Weight: 113 kg (249 lb)   Body mass index is 45.54 kg/m².  Temp: 97.7 °F (36.5 °C)   Heart Rate: 89   BP: 136/86     Physical Exam  Vitals reviewed.   Constitutional:       Appearance: She is well-developed.   HENT:      Head: Normocephalic and atraumatic.      Comments: edentulous     Nose: Nose normal.   Eyes:      Conjunctiva/sclera: Conjunctivae normal.      Pupils: Pupils are equal, round, and reactive to light.   Neck:      Thyroid: No thyromegaly.      Vascular: No carotid bruit.      Trachea: No tracheal deviation.   Cardiovascular:      Rate and Rhythm: Normal rate and regular rhythm.      Heart sounds: Normal heart sounds.   Pulmonary:      Effort: Pulmonary effort is normal. No respiratory distress.      Breath sounds: Normal breath sounds.   Abdominal:      General: There is no distension.      Palpations: Abdomen is soft.      Tenderness: There is no abdominal tenderness.      Comments: Lap scars, low transverse C section scar   Musculoskeletal:         General: No deformity. Normal range of motion.      Cervical back: Normal range of motion and neck supple.   Skin:     General: Skin is warm and dry.      Findings: No rash.   Neurological:      Mental Status: She is alert and oriented to person, place, and time.      Cranial Nerves: No cranial nerve deficit.      Coordination: Coordination normal.   Psychiatric:         Behavior: Behavior normal.         Thought Content: Thought content normal.         Judgment: Judgment normal.       Problem List Items Addressed This Visit       Type 2 diabetes mellitus    Overview     dx in 2022; no insulin; last a1c 7         Relevant Medications    glipizide (GLUCOTROL XL) 5 MG ER tablet    insulin glargine (LANTUS, SEMGLEE) 100 UNIT/ML injection    BALAJI on  CPAP    Mild intermittent asthma without complication    GERD (gastroesophageal reflux disease)    Overview     Omeprazole; EGD in 2022; hx of h pylori         Relevant Medications    omeprazole (priLOSEC) 20 MG capsule    Back pain    Overview     Naproxen and Flexeril; hx of steroid injections         Anxiety and depression     Other Visit Diagnoses       Obesity, Class III, BMI 40-49.9 (morbid obesity)    -  Primary            Assessment:    Merle Muir is a 43 y.o. year old female with medically complicated obesity.    Weight loss surgery is deemed medically necessary given the following obesity related comorbidities including asthma, back pain, depression, GERD, BALAJI, DM2, urinary incontinence with current Weight: 113 kg (249 lb) and Body mass index is 45.54 kg/m²..    Patient is aware that surgery is a tool, and that weight loss is not guaranteed but only seen in the context of appropriate use, follow up and exercise.    The patient was present for an approximately a 2.5 hour discussion of the purpose of weight loss surgery, how WLS is a tool to assist in achieving weight loss goals, the most common complications and how best to avoid them, and the strategies for short and long term weight loss.  Ample opportunity to discuss questions was available both in group and during the time of individual examination.    I reviewed all available preop labs, Xrays, tests, clearances, etc and signed off on these in the record.  All of this in addition to the patient's unique history and exam has been taken into consideration in determining their appropriate candidacy for weight loss surgery.    Complications  of laparoscopic/possible robotic gastric sleeve were discussed. The patient is well aware of the potential complications of surgery that include but not limited to bleeding, infections, deep venous thrombosis, pulmonary embolism, pulmonary complications such as pneumonia, cardiac events, hernias, small  "bowel obstruction, damage to the spleen or other organs, bowel injury, disfiguring scars, failure to lose weight, need for additional surgery, conversion to an open procedure, and death. Patient is also aware of complications which apply in this particular procedure that can include but are not limited to a \"leak\" at the staple line which in some instances may require conversion to gastric bypass.    The patient is aware if a hiatal hernia is encountered, it likely will be repaired.  R/B/A Rx to hiatal hernia repair were discussed as outlined in our long consent form.  Briefly risks in addition to those for LSG include recurrent hernia, GUY, dysphagia, esophageal injury, pneumothorax, injury to the vagus nerves, injury to the thoracic duct, aorta or vena cava.    Greater than 3 minutes was spent with the patient discussing avoiding all tobacco products and second hand smoke at least 2 weeks pre-operatively and 6 weeks post-operatively to minimize the risk of sleeve leak.  This included discussing the importance of avoiding even secondhand smoke as the risk of leak is increased.  Examples discussed:  I made it very clear that the patient understands they should avoid even riding in a car where someone has previously smoked in the last 2 weeks, living in a house where someone smokes (even if it's in a separate room/patio/attached garage, etc.) we discussed that they should not have a conversation with a group of people who are smoking even if it's outside.  They can be around wood burning fires and barbecue.  I told them I do not know if marijuana has a same effects but my overall recommendation is to avoid it for 2 weeks prior in 6 weeks after surgery.  They also are aware that nicotine may also increase the risk of leak and I strongly encouraged him to avoid that as well for 2 weeks prior in 6 weeks after surgery.    Discussed the risks, benefits and alternative therapies at great length as outlined in our extensive " consent forms, consent videos, and educational teaching process under the direction of the center's .    A copy of the patient's signed informed consent is on file.    Plan:  Laparoscopic , possible robotic assisted sleeve gastrectomy  at [facility]      R/B/A Rx discussed to postop anticoagulation including but not limited to bleeding, drug reaction, venothromboembolic events, etc. and she declined.    MDM high:  Elective procedure with the following risk factors: morbid obesity, DM2, chronic pain, PTSD, reported panic disorder in hospital, RBBB, asthma  4+ chronic medical problems reviewed.    I spent 60 minutes caring for Merle on this date of service. This time includes time spent by me in the following activities: preparing for the visit, reviewing tests, performing a medically appropriate examination and/or evaluation, counseling and educating the patient/family/caregiver, referring and communicating with other health care professionals, documenting information in the medical record, care coordination, ordering medications, ordering test(s), ordering procedure(s), obtaining a separately obtained history, and reviewing a separately obtained history.       Thank you UseryKush MD for allowing me to share in the care of our mutual patient.             Kelsey Cerna MD                                           Answers submitted by the patient for this visit:  Other (Submitted on 6/22/2024)  Please describe your symptoms.: Pretty sure this cisit was for my gastric bypass  Have you had these symptoms before?: Yes  How long have you been having these symptoms?: Greater than 2 weeks  Primary Reason for Visit (Submitted on 6/22/2024)  What is the primary reason for your visit?: Other

## 2024-06-25 PROBLEM — E66.01 OBESITY, CLASS III, BMI 40-49.9 (MORBID OBESITY): Status: ACTIVE | Noted: 2024-06-24

## 2024-06-25 PROBLEM — E66.813 OBESITY, CLASS III, BMI 40-49.9 (MORBID OBESITY): Status: ACTIVE | Noted: 2024-06-24

## 2024-07-02 ENCOUNTER — PRE-ADMISSION TESTING (OUTPATIENT)
Dept: PREADMISSION TESTING | Facility: HOSPITAL | Age: 44
End: 2024-07-02
Payer: MEDICAID

## 2024-07-02 DIAGNOSIS — E66.01 OBESITY, CLASS III, BMI 40-49.9 (MORBID OBESITY): ICD-10-CM

## 2024-07-02 LAB
DEPRECATED RDW RBC AUTO: 41.4 FL (ref 37–54)
ERYTHROCYTE [DISTWIDTH] IN BLOOD BY AUTOMATED COUNT: 14.5 % (ref 12.3–15.4)
HBA1C MFR BLD: 6 % (ref 4.8–5.6)
HCT VFR BLD AUTO: 43.4 % (ref 34–46.6)
HGB BLD-MCNC: 14.1 G/DL (ref 12–15.9)
MCH RBC QN AUTO: 25.9 PG (ref 26.6–33)
MCHC RBC AUTO-ENTMCNC: 32.5 G/DL (ref 31.5–35.7)
MCV RBC AUTO: 79.6 FL (ref 79–97)
PLATELET # BLD AUTO: 325 10*3/MM3 (ref 140–450)
PMV BLD AUTO: 10.2 FL (ref 6–12)
POTASSIUM SERPL-SCNC: 4.2 MMOL/L (ref 3.5–5.2)
QT INTERVAL: 370 MS
QTC INTERVAL: 442 MS
RBC # BLD AUTO: 5.45 10*6/MM3 (ref 3.77–5.28)
WBC NRBC COR # BLD AUTO: 7.36 10*3/MM3 (ref 3.4–10.8)

## 2024-07-02 PROCEDURE — 87081 CULTURE SCREEN ONLY: CPT

## 2024-07-02 PROCEDURE — 93005 ELECTROCARDIOGRAM TRACING: CPT

## 2024-07-02 PROCEDURE — 85027 COMPLETE CBC AUTOMATED: CPT

## 2024-07-02 PROCEDURE — 36415 COLL VENOUS BLD VENIPUNCTURE: CPT

## 2024-07-02 PROCEDURE — 84132 ASSAY OF SERUM POTASSIUM: CPT

## 2024-07-02 PROCEDURE — 83036 HEMOGLOBIN GLYCOSYLATED A1C: CPT

## 2024-07-02 NOTE — DISCHARGE INSTRUCTIONS
Patient to apply Chlorhexadine wipes  to surgical area (as instructed) the night before procedure and the AM of procedure. Wipes provided.     Patient instructed to drink 20 ounces of Gatorade or Gatorlyte (if diabetic) and it needs to be completed 1 hour (for Main OR patients) or 2 hours (scheduled  section & [facility]PSC patients) before given arrival time for procedure (NO RED Gatorade and NO Gatorade Zero).    Patient verbalized understanding.     Patient viewed general PAT education video as instructed in their preoperative information received from their surgeon.  Patient stated the general PAT education video was viewed in its entirety and survey completed.  Copies of PAT general education handouts (Incentive Spirometry, Meds to Beds Program, Patient Belongings, Pre-op skin preparation instructions, Blood Glucose testing, Visitor policy, Surgery FAQ, Code H) distributed to patient if not printed. Education related to the PAT pass and skin preparation for surgery (if applicable) completed in PAT as a reinforcement to PAT education video. Patient instructed to return PAT pass provided today as well as completed skin preparation sheet (if applicable) on the day of procedure.     Additionally if patient had not viewed video yet but intended to view it at home or in our waiting area, then referred them to the handout with QR code/link provided during PAT visit.  Instructed patient to complete survey after viewing the video in its entirety.  Encouraged patient/family to read PAT general education handouts thoroughly and notify PAT staff with any questions or concerns. Patient verbalized understanding of all information and priority content.

## 2024-07-02 NOTE — PAT
An arrival time for procedure was not provided during PAT visit. If patient had any questions or concerns about their arrival time, they were instructed to contact their surgeon/physician.  Additionally, if the patient referred to an arrival time that was acquired from their my chart account, patient was encouraged to verify that time with their surgeon/physician. Arrival times are NOT provided in Pre Admission Testing Department.     Patient denies any current skin issues.      Patient to apply Chlorhexadine wipes  to surgical area (as instructed) the night before procedure and the AM of procedure. Wipes provided.     Patient instructed to drink 20 ounces of Gatorade or Gatorlyte (if diabetic) and it needs to be completed 1 hour (for Main OR patients) or 2 hours (scheduled  section & [facility] patients) before given arrival time for procedure (NO RED Gatorade and NO Gatorade Zero).    Patient verbalized understanding.     Pt reports that her c-pap is not currently working.

## 2024-07-03 LAB — MRSA SPEC QL CULT: NORMAL

## 2024-07-09 ENCOUNTER — ANESTHESIA EVENT (OUTPATIENT)
Dept: PERIOP | Facility: HOSPITAL | Age: 44
End: 2024-07-09
Payer: MEDICAID

## 2024-07-09 ENCOUNTER — ANESTHESIA (OUTPATIENT)
Dept: PERIOP | Facility: HOSPITAL | Age: 44
End: 2024-07-09
Payer: MEDICAID

## 2024-07-09 ENCOUNTER — HOSPITAL ENCOUNTER (INPATIENT)
Facility: HOSPITAL | Age: 44
LOS: 3 days | Discharge: HOME | End: 2024-07-12
Attending: SURGERY | Admitting: SURGERY
Payer: MEDICAID

## 2024-07-09 ENCOUNTER — ANESTHESIA EVENT CONVERTED (OUTPATIENT)
Dept: ANESTHESIOLOGY | Facility: HOSPITAL | Age: 44
End: 2024-07-09
Payer: MEDICAID

## 2024-07-09 DIAGNOSIS — E66.01 OBESITY, CLASS III, BMI 40-49.9 (MORBID OBESITY): ICD-10-CM

## 2024-07-09 LAB
B-HCG UR QL: NEGATIVE
EXPIRATION DATE: NORMAL
GLUCOSE BLDC GLUCOMTR-MCNC: 124 MG/DL (ref 70–130)
GLUCOSE BLDC GLUCOMTR-MCNC: 183 MG/DL (ref 70–130)
GLUCOSE BLDC GLUCOMTR-MCNC: 201 MG/DL (ref 70–130)
GLUCOSE BLDC GLUCOMTR-MCNC: 225 MG/DL (ref 70–130)
INTERNAL NEGATIVE CONTROL: NORMAL
INTERNAL POSITIVE CONTROL: NORMAL
Lab: NORMAL

## 2024-07-09 PROCEDURE — 25010000002 GLUCAGON (RDNA) PER 1 MG: Performed by: NURSE ANESTHETIST, CERTIFIED REGISTERED

## 2024-07-09 PROCEDURE — 81025 URINE PREGNANCY TEST: CPT | Performed by: ANESTHESIOLOGY

## 2024-07-09 PROCEDURE — 25010000002 DEXAMETHASONE SODIUM PHOSPHATE 10 MG/ML SOLUTION: Performed by: NURSE ANESTHETIST, CERTIFIED REGISTERED

## 2024-07-09 PROCEDURE — 25010000002 MIDAZOLAM PER 1 MG

## 2024-07-09 PROCEDURE — 88307 TISSUE EXAM BY PATHOLOGIST: CPT | Performed by: SURGERY

## 2024-07-09 PROCEDURE — 25010000002 CEFAZOLIN PER 500 MG: Performed by: SURGERY

## 2024-07-09 PROCEDURE — 25010000002 HYDROMORPHONE PER 4 MG: Performed by: SURGERY

## 2024-07-09 PROCEDURE — 8E0W4CZ ROBOTIC ASSISTED PROCEDURE OF TRUNK REGION, PERCUTANEOUS ENDOSCOPIC APPROACH: ICD-10-PCS | Performed by: SURGERY

## 2024-07-09 PROCEDURE — 88342 IMHCHEM/IMCYTCHM 1ST ANTB: CPT | Performed by: SURGERY

## 2024-07-09 PROCEDURE — 25010000002 PROPOFOL 10 MG/ML EMULSION: Performed by: NURSE ANESTHETIST, CERTIFIED REGISTERED

## 2024-07-09 PROCEDURE — 25010000002 FENTANYL CITRATE (PF) 100 MCG/2ML SOLUTION: Performed by: NURSE ANESTHETIST, CERTIFIED REGISTERED

## 2024-07-09 PROCEDURE — 25010000002 BUPIVACAINE (PF) 0.25 % SOLUTION: Performed by: NURSE ANESTHETIST, CERTIFIED REGISTERED

## 2024-07-09 PROCEDURE — 0DJ08ZZ INSPECTION OF UPPER INTESTINAL TRACT, VIA NATURAL OR ARTIFICIAL OPENING ENDOSCOPIC: ICD-10-PCS | Performed by: SURGERY

## 2024-07-09 PROCEDURE — 25010000002 ENOXAPARIN PER 10 MG: Performed by: SURGERY

## 2024-07-09 PROCEDURE — 25810000003 SODIUM CHLORIDE 0.9 % SOLUTION: Performed by: SURGERY

## 2024-07-09 PROCEDURE — 25810000003 LACTATED RINGERS PER 1000 ML: Performed by: SURGERY

## 2024-07-09 PROCEDURE — 25010000002 THIAMINE HCL 200 MG/2ML SOLUTION: Performed by: SURGERY

## 2024-07-09 PROCEDURE — 94640 AIRWAY INHALATION TREATMENT: CPT

## 2024-07-09 PROCEDURE — 25010000002 AMISULPRIDE (ANTIEMETIC) 5 MG/2ML SOLUTION: Performed by: NURSE ANESTHETIST, CERTIFIED REGISTERED

## 2024-07-09 PROCEDURE — 63710000001 INSULIN LISPRO (HUMAN) PER 5 UNITS: Performed by: SURGERY

## 2024-07-09 PROCEDURE — 0DB64Z3 EXCISION OF STOMACH, PERCUTANEOUS ENDOSCOPIC APPROACH, VERTICAL: ICD-10-PCS | Performed by: SURGERY

## 2024-07-09 PROCEDURE — 25010000002 ONDANSETRON PER 1 MG: Performed by: NURSE ANESTHETIST, CERTIFIED REGISTERED

## 2024-07-09 PROCEDURE — 25010000002 FENTANYL CITRATE (PF) 50 MCG/ML SOLUTION

## 2024-07-09 PROCEDURE — 25010000002 HYDROMORPHONE 1 MG/ML SOLUTION

## 2024-07-09 PROCEDURE — 82948 REAGENT STRIP/BLOOD GLUCOSE: CPT

## 2024-07-09 PROCEDURE — 25010000002 DEXAMETHASONE PER 1 MG: Performed by: NURSE ANESTHETIST, CERTIFIED REGISTERED

## 2024-07-09 PROCEDURE — 43775 LAP SLEEVE GASTRECTOMY: CPT | Performed by: SURGERY

## 2024-07-09 PROCEDURE — 25010000002 SUGAMMADEX 200 MG/2ML SOLUTION: Performed by: NURSE ANESTHETIST, CERTIFIED REGISTERED

## 2024-07-09 DEVICE — STAPLER 60 RELOAD BLUE
Type: IMPLANTABLE DEVICE | Site: ABDOMEN | Status: FUNCTIONAL
Brand: SUREFORM

## 2024-07-09 DEVICE — DEV CONTRL TISS STRATAFIX SPIRAL PDS PLS CT1 2-0 45CM: Type: IMPLANTABLE DEVICE | Site: ABDOMEN | Status: FUNCTIONAL

## 2024-07-09 DEVICE — STAPLER 60 RELOAD WHITE
Type: IMPLANTABLE DEVICE | Site: ABDOMEN | Status: FUNCTIONAL
Brand: SUREFORM

## 2024-07-09 RX ORDER — SODIUM CHLORIDE 9 MG/ML
150 INJECTION, SOLUTION INTRAVENOUS CONTINUOUS
Status: DISCONTINUED | OUTPATIENT
Start: 2024-07-09 | End: 2024-07-10

## 2024-07-09 RX ORDER — FENTANYL CITRATE 50 UG/ML
INJECTION, SOLUTION INTRAMUSCULAR; INTRAVENOUS AS NEEDED
Status: DISCONTINUED | OUTPATIENT
Start: 2024-07-09 | End: 2024-07-09 | Stop reason: SURG

## 2024-07-09 RX ORDER — NALOXONE HCL 0.4 MG/ML
0.4 VIAL (ML) INJECTION AS NEEDED
Status: DISCONTINUED | OUTPATIENT
Start: 2024-07-09 | End: 2024-07-09 | Stop reason: HOSPADM

## 2024-07-09 RX ORDER — GABAPENTIN 300 MG/1
600 CAPSULE ORAL ONCE
Status: COMPLETED | OUTPATIENT
Start: 2024-07-09 | End: 2024-07-09

## 2024-07-09 RX ORDER — IBUPROFEN 600 MG/1
TABLET ORAL AS NEEDED
Status: DISCONTINUED | OUTPATIENT
Start: 2024-07-09 | End: 2024-07-09 | Stop reason: SURG

## 2024-07-09 RX ORDER — DROPERIDOL 2.5 MG/ML
0.62 INJECTION, SOLUTION INTRAMUSCULAR; INTRAVENOUS
Status: DISCONTINUED | OUTPATIENT
Start: 2024-07-09 | End: 2024-07-09 | Stop reason: HOSPADM

## 2024-07-09 RX ORDER — LIDOCAINE HYDROCHLORIDE 10 MG/ML
0.5 INJECTION, SOLUTION EPIDURAL; INFILTRATION; INTRACAUDAL; PERINEURAL ONCE AS NEEDED
Status: DISCONTINUED | OUTPATIENT
Start: 2024-07-09 | End: 2024-07-09 | Stop reason: HOSPADM

## 2024-07-09 RX ORDER — HYDROCODONE BITARTRATE AND ACETAMINOPHEN 5; 325 MG/1; MG/1
1 TABLET ORAL ONCE AS NEEDED
Status: DISCONTINUED | OUTPATIENT
Start: 2024-07-09 | End: 2024-07-09 | Stop reason: HOSPADM

## 2024-07-09 RX ORDER — METOCLOPRAMIDE HYDROCHLORIDE 5 MG/ML
10 INJECTION INTRAMUSCULAR; INTRAVENOUS EVERY 6 HOURS PRN
Status: DISCONTINUED | OUTPATIENT
Start: 2024-07-09 | End: 2024-07-12 | Stop reason: HOSPADM

## 2024-07-09 RX ORDER — ALBUTEROL SULFATE 90 UG/1
2 AEROSOL, METERED RESPIRATORY (INHALATION) EVERY 6 HOURS PRN
Status: DISCONTINUED | OUTPATIENT
Start: 2024-07-09 | End: 2024-07-09

## 2024-07-09 RX ORDER — BUPIVACAINE HYDROCHLORIDE 2.5 MG/ML
INJECTION, SOLUTION EPIDURAL; INFILTRATION; INTRACAUDAL
Status: COMPLETED | OUTPATIENT
Start: 2024-07-09 | End: 2024-07-09

## 2024-07-09 RX ORDER — METOCLOPRAMIDE 10 MG/1
10 TABLET ORAL EVERY 6 HOURS PRN
Status: DISCONTINUED | OUTPATIENT
Start: 2024-07-09 | End: 2024-07-12 | Stop reason: HOSPADM

## 2024-07-09 RX ORDER — MIDAZOLAM HYDROCHLORIDE 1 MG/ML
INJECTION INTRAMUSCULAR; INTRAVENOUS
Status: COMPLETED
Start: 2024-07-09 | End: 2024-07-09

## 2024-07-09 RX ORDER — PREGABALIN 50 MG/1
50 CAPSULE ORAL DAILY
Status: DISCONTINUED | OUTPATIENT
Start: 2024-07-10 | End: 2024-07-09

## 2024-07-09 RX ORDER — IBUPROFEN 600 MG/1
1 TABLET ORAL
Status: DISCONTINUED | OUTPATIENT
Start: 2024-07-09 | End: 2024-07-12 | Stop reason: HOSPADM

## 2024-07-09 RX ORDER — ACETAMINOPHEN 500 MG
1000 TABLET ORAL ONCE
Status: COMPLETED | OUTPATIENT
Start: 2024-07-09 | End: 2024-07-09

## 2024-07-09 RX ORDER — ROCURONIUM BROMIDE 10 MG/ML
INJECTION, SOLUTION INTRAVENOUS AS NEEDED
Status: DISCONTINUED | OUTPATIENT
Start: 2024-07-09 | End: 2024-07-09 | Stop reason: SURG

## 2024-07-09 RX ORDER — PROPOFOL 10 MG/ML
VIAL (ML) INTRAVENOUS AS NEEDED
Status: DISCONTINUED | OUTPATIENT
Start: 2024-07-09 | End: 2024-07-09 | Stop reason: SURG

## 2024-07-09 RX ORDER — CYANOCOBALAMIN 1000 UG/ML
1000 INJECTION, SOLUTION INTRAMUSCULAR; SUBCUTANEOUS ONCE
Status: COMPLETED | OUTPATIENT
Start: 2024-07-10 | End: 2024-07-10

## 2024-07-09 RX ORDER — INSULIN LISPRO 100 [IU]/ML
3-14 INJECTION, SOLUTION INTRAVENOUS; SUBCUTANEOUS
Status: DISCONTINUED | OUTPATIENT
Start: 2024-07-09 | End: 2024-07-12 | Stop reason: HOSPADM

## 2024-07-09 RX ORDER — PROMETHAZINE HYDROCHLORIDE 25 MG/1
25 SUPPOSITORY RECTAL ONCE AS NEEDED
Status: DISCONTINUED | OUTPATIENT
Start: 2024-07-09 | End: 2024-07-09 | Stop reason: HOSPADM

## 2024-07-09 RX ORDER — MEPERIDINE HYDROCHLORIDE 25 MG/ML
12.5 INJECTION INTRAMUSCULAR; INTRAVENOUS; SUBCUTANEOUS
Status: DISCONTINUED | OUTPATIENT
Start: 2024-07-09 | End: 2024-07-09 | Stop reason: HOSPADM

## 2024-07-09 RX ORDER — HYDRALAZINE HYDROCHLORIDE 20 MG/ML
5 INJECTION INTRAMUSCULAR; INTRAVENOUS
Status: DISCONTINUED | OUTPATIENT
Start: 2024-07-09 | End: 2024-07-09 | Stop reason: HOSPADM

## 2024-07-09 RX ORDER — MIDAZOLAM HYDROCHLORIDE 1 MG/ML
1 INJECTION INTRAMUSCULAR; INTRAVENOUS
Status: DISCONTINUED | OUTPATIENT
Start: 2024-07-09 | End: 2024-07-09 | Stop reason: HOSPADM

## 2024-07-09 RX ORDER — MAGNESIUM HYDROXIDE 1200 MG/15ML
LIQUID ORAL AS NEEDED
Status: DISCONTINUED | OUTPATIENT
Start: 2024-07-09 | End: 2024-07-09 | Stop reason: HOSPADM

## 2024-07-09 RX ORDER — SCOLOPAMINE TRANSDERMAL SYSTEM 1 MG/1
1 PATCH, EXTENDED RELEASE TRANSDERMAL ONCE
Status: DISCONTINUED | OUTPATIENT
Start: 2024-07-09 | End: 2024-07-10

## 2024-07-09 RX ORDER — ENOXAPARIN SODIUM 100 MG/ML
INJECTION SUBCUTANEOUS AS NEEDED
Status: DISCONTINUED | OUTPATIENT
Start: 2024-07-09 | End: 2024-07-09 | Stop reason: HOSPADM

## 2024-07-09 RX ORDER — ALBUTEROL SULFATE 2.5 MG/3ML
2.5 SOLUTION RESPIRATORY (INHALATION) EVERY 6 HOURS PRN
Status: DISCONTINUED | OUTPATIENT
Start: 2024-07-09 | End: 2024-07-12 | Stop reason: HOSPADM

## 2024-07-09 RX ORDER — HYDRALAZINE HYDROCHLORIDE 20 MG/ML
10 INJECTION INTRAMUSCULAR; INTRAVENOUS
Status: DISCONTINUED | OUTPATIENT
Start: 2024-07-09 | End: 2024-07-12 | Stop reason: HOSPADM

## 2024-07-09 RX ORDER — PROCHLORPERAZINE EDISYLATE 5 MG/ML
10 INJECTION INTRAMUSCULAR; INTRAVENOUS EVERY 6 HOURS PRN
Status: DISCONTINUED | OUTPATIENT
Start: 2024-07-09 | End: 2024-07-12 | Stop reason: HOSPADM

## 2024-07-09 RX ORDER — THIAMINE HYDROCHLORIDE 100 MG/ML
100 INJECTION, SOLUTION INTRAMUSCULAR; INTRAVENOUS ONCE
Status: COMPLETED | OUTPATIENT
Start: 2024-07-09 | End: 2024-07-09

## 2024-07-09 RX ORDER — OXYCODONE HYDROCHLORIDE 5 MG/1
5 TABLET ORAL EVERY 6 HOURS PRN
Status: DISCONTINUED | OUTPATIENT
Start: 2024-07-09 | End: 2024-07-12 | Stop reason: HOSPADM

## 2024-07-09 RX ORDER — SODIUM CHLORIDE 0.9 % (FLUSH) 0.9 %
10 SYRINGE (ML) INJECTION EVERY 12 HOURS SCHEDULED
Status: DISCONTINUED | OUTPATIENT
Start: 2024-07-09 | End: 2024-07-09 | Stop reason: HOSPADM

## 2024-07-09 RX ORDER — HYDROMORPHONE HYDROCHLORIDE 2 MG/1
2 TABLET ORAL EVERY 4 HOURS PRN
Status: DISCONTINUED | OUTPATIENT
Start: 2024-07-09 | End: 2024-07-12 | Stop reason: HOSPADM

## 2024-07-09 RX ORDER — LIDOCAINE HYDROCHLORIDE 10 MG/ML
INJECTION, SOLUTION EPIDURAL; INFILTRATION; INTRACAUDAL; PERINEURAL AS NEEDED
Status: DISCONTINUED | OUTPATIENT
Start: 2024-07-09 | End: 2024-07-09 | Stop reason: SURG

## 2024-07-09 RX ORDER — PROMETHAZINE HYDROCHLORIDE 25 MG/1
12.5 TABLET ORAL EVERY 6 HOURS PRN
Status: DISCONTINUED | OUTPATIENT
Start: 2024-07-09 | End: 2024-07-12 | Stop reason: HOSPADM

## 2024-07-09 RX ORDER — ONDANSETRON 2 MG/ML
4 INJECTION INTRAMUSCULAR; INTRAVENOUS ONCE AS NEEDED
Status: DISCONTINUED | OUTPATIENT
Start: 2024-07-09 | End: 2024-07-09 | Stop reason: HOSPADM

## 2024-07-09 RX ORDER — DIPHENHYDRAMINE HYDROCHLORIDE 50 MG/ML
25 INJECTION INTRAMUSCULAR; INTRAVENOUS EVERY 4 HOURS PRN
Status: DISCONTINUED | OUTPATIENT
Start: 2024-07-09 | End: 2024-07-12 | Stop reason: HOSPADM

## 2024-07-09 RX ORDER — DEXAMETHASONE SODIUM PHOSPHATE 10 MG/ML
INJECTION, SOLUTION INTRAMUSCULAR; INTRAVENOUS
Status: COMPLETED | OUTPATIENT
Start: 2024-07-09 | End: 2024-07-09

## 2024-07-09 RX ORDER — DEXAMETHASONE SODIUM PHOSPHATE 4 MG/ML
INJECTION, SOLUTION INTRA-ARTICULAR; INTRALESIONAL; INTRAMUSCULAR; INTRAVENOUS; SOFT TISSUE AS NEEDED
Status: DISCONTINUED | OUTPATIENT
Start: 2024-07-09 | End: 2024-07-09 | Stop reason: SURG

## 2024-07-09 RX ORDER — FIBRINOGEN HUMAN, HUMAN THROMBIN 4 ML
KIT TOPICAL AS NEEDED
Status: DISCONTINUED | OUTPATIENT
Start: 2024-07-09 | End: 2024-07-09 | Stop reason: HOSPADM

## 2024-07-09 RX ORDER — SODIUM CHLORIDE 9 MG/ML
40 INJECTION, SOLUTION INTRAVENOUS AS NEEDED
Status: DISCONTINUED | OUTPATIENT
Start: 2024-07-09 | End: 2024-07-09 | Stop reason: HOSPADM

## 2024-07-09 RX ORDER — PROMETHAZINE HYDROCHLORIDE 25 MG/1
25 TABLET ORAL ONCE AS NEEDED
Status: DISCONTINUED | OUTPATIENT
Start: 2024-07-09 | End: 2024-07-09 | Stop reason: HOSPADM

## 2024-07-09 RX ORDER — PANTOPRAZOLE SODIUM 40 MG/1
40 TABLET, DELAYED RELEASE ORAL EVERY MORNING
Status: DISCONTINUED | OUTPATIENT
Start: 2024-07-10 | End: 2024-07-12 | Stop reason: HOSPADM

## 2024-07-09 RX ORDER — ENOXAPARIN SODIUM 100 MG/ML
40 INJECTION SUBCUTANEOUS EVERY 24 HOURS
Status: DISCONTINUED | OUTPATIENT
Start: 2024-07-10 | End: 2024-07-10

## 2024-07-09 RX ORDER — DROPERIDOL 2.5 MG/ML
0.62 INJECTION, SOLUTION INTRAMUSCULAR; INTRAVENOUS ONCE AS NEEDED
Status: DISCONTINUED | OUTPATIENT
Start: 2024-07-09 | End: 2024-07-09 | Stop reason: HOSPADM

## 2024-07-09 RX ORDER — ONDANSETRON 2 MG/ML
INJECTION INTRAMUSCULAR; INTRAVENOUS AS NEEDED
Status: DISCONTINUED | OUTPATIENT
Start: 2024-07-09 | End: 2024-07-09 | Stop reason: SURG

## 2024-07-09 RX ORDER — ACETAMINOPHEN 160 MG/5ML
1000 SOLUTION ORAL EVERY 8 HOURS SCHEDULED
Status: COMPLETED | OUTPATIENT
Start: 2024-07-09 | End: 2024-07-11

## 2024-07-09 RX ORDER — SODIUM CHLORIDE AND POTASSIUM CHLORIDE 150; 450 MG/100ML; MG/100ML
100 INJECTION, SOLUTION INTRAVENOUS CONTINUOUS
Status: DISCONTINUED | OUTPATIENT
Start: 2024-07-10 | End: 2024-07-12 | Stop reason: HOSPADM

## 2024-07-09 RX ORDER — LABETALOL HYDROCHLORIDE 5 MG/ML
10 INJECTION, SOLUTION INTRAVENOUS
Status: DISCONTINUED | OUTPATIENT
Start: 2024-07-09 | End: 2024-07-12 | Stop reason: HOSPADM

## 2024-07-09 RX ORDER — DEXTROSE MONOHYDRATE 25 G/50ML
25 INJECTION, SOLUTION INTRAVENOUS
Status: DISCONTINUED | OUTPATIENT
Start: 2024-07-09 | End: 2024-07-12 | Stop reason: HOSPADM

## 2024-07-09 RX ORDER — BUDESONIDE AND FORMOTEROL FUMARATE DIHYDRATE 160; 4.5 UG/1; UG/1
2 AEROSOL RESPIRATORY (INHALATION)
Status: DISCONTINUED | OUTPATIENT
Start: 2024-07-09 | End: 2024-07-12 | Stop reason: HOSPADM

## 2024-07-09 RX ORDER — METHOCARBAMOL 500 MG/1
500 TABLET, FILM COATED ORAL 3 TIMES DAILY
Status: DISCONTINUED | OUTPATIENT
Start: 2024-07-09 | End: 2024-07-12 | Stop reason: HOSPADM

## 2024-07-09 RX ORDER — SIMETHICONE 80 MG
80 TABLET,CHEWABLE ORAL 4 TIMES DAILY PRN
Status: DISCONTINUED | OUTPATIENT
Start: 2024-07-09 | End: 2024-07-12 | Stop reason: HOSPADM

## 2024-07-09 RX ORDER — SODIUM CHLORIDE 0.9 % (FLUSH) 0.9 %
10 SYRINGE (ML) INJECTION AS NEEDED
Status: DISCONTINUED | OUTPATIENT
Start: 2024-07-09 | End: 2024-07-09 | Stop reason: HOSPADM

## 2024-07-09 RX ORDER — IPRATROPIUM BROMIDE AND ALBUTEROL SULFATE 2.5; .5 MG/3ML; MG/3ML
3 SOLUTION RESPIRATORY (INHALATION) ONCE AS NEEDED
Status: DISCONTINUED | OUTPATIENT
Start: 2024-07-09 | End: 2024-07-09 | Stop reason: HOSPADM

## 2024-07-09 RX ORDER — SODIUM CHLORIDE, SODIUM LACTATE, POTASSIUM CHLORIDE, CALCIUM CHLORIDE 600; 310; 30; 20 MG/100ML; MG/100ML; MG/100ML; MG/100ML
150 INJECTION, SOLUTION INTRAVENOUS CONTINUOUS
Status: ACTIVE | OUTPATIENT
Start: 2024-07-09 | End: 2024-07-10

## 2024-07-09 RX ORDER — GABAPENTIN 100 MG/1
100 CAPSULE ORAL 3 TIMES DAILY
Status: COMPLETED | OUTPATIENT
Start: 2024-07-09 | End: 2024-07-11

## 2024-07-09 RX ORDER — ONDANSETRON 4 MG/1
4 TABLET, ORALLY DISINTEGRATING ORAL EVERY 6 HOURS PRN
Status: DISCONTINUED | OUTPATIENT
Start: 2024-07-13 | End: 2024-07-12 | Stop reason: HOSPADM

## 2024-07-09 RX ORDER — FENTANYL CITRATE 50 UG/ML
50 INJECTION, SOLUTION INTRAMUSCULAR; INTRAVENOUS
Status: DISCONTINUED | OUTPATIENT
Start: 2024-07-09 | End: 2024-07-09 | Stop reason: HOSPADM

## 2024-07-09 RX ORDER — HYDROMORPHONE HYDROCHLORIDE 1 MG/ML
0.5 INJECTION, SOLUTION INTRAMUSCULAR; INTRAVENOUS; SUBCUTANEOUS
Status: DISCONTINUED | OUTPATIENT
Start: 2024-07-09 | End: 2024-07-09 | Stop reason: HOSPADM

## 2024-07-09 RX ORDER — FENTANYL CITRATE 50 UG/ML
INJECTION, SOLUTION INTRAMUSCULAR; INTRAVENOUS
Status: COMPLETED
Start: 2024-07-09 | End: 2024-07-09

## 2024-07-09 RX ORDER — ONDANSETRON 2 MG/ML
4 INJECTION INTRAMUSCULAR; INTRAVENOUS EVERY 6 HOURS PRN
Status: DISCONTINUED | OUTPATIENT
Start: 2024-07-09 | End: 2024-07-12 | Stop reason: HOSPADM

## 2024-07-09 RX ORDER — ACETAMINOPHEN 500 MG
1000 TABLET ORAL EVERY 8 HOURS SCHEDULED
Status: COMPLETED | OUTPATIENT
Start: 2024-07-09 | End: 2024-07-11

## 2024-07-09 RX ORDER — SODIUM CHLORIDE 0.9 % (FLUSH) 0.9 %
1-10 SYRINGE (ML) INJECTION AS NEEDED
Status: DISCONTINUED | OUTPATIENT
Start: 2024-07-09 | End: 2024-07-12 | Stop reason: HOSPADM

## 2024-07-09 RX ORDER — PANTOPRAZOLE SODIUM 40 MG/10ML
40 INJECTION, POWDER, LYOPHILIZED, FOR SOLUTION INTRAVENOUS ONCE
Status: COMPLETED | OUTPATIENT
Start: 2024-07-09 | End: 2024-07-09

## 2024-07-09 RX ORDER — HYDROMORPHONE HYDROCHLORIDE 1 MG/ML
0.5 INJECTION, SOLUTION INTRAMUSCULAR; INTRAVENOUS; SUBCUTANEOUS
Status: DISCONTINUED | OUTPATIENT
Start: 2024-07-09 | End: 2024-07-12 | Stop reason: HOSPADM

## 2024-07-09 RX ORDER — PRAZOSIN HYDROCHLORIDE 1 MG/1
5 CAPSULE ORAL NIGHTLY
Status: DISCONTINUED | OUTPATIENT
Start: 2024-07-09 | End: 2024-07-12 | Stop reason: HOSPADM

## 2024-07-09 RX ORDER — SODIUM CHLORIDE 0.9 % (FLUSH) 0.9 %
3 SYRINGE (ML) INJECTION EVERY 12 HOURS SCHEDULED
Status: DISCONTINUED | OUTPATIENT
Start: 2024-07-09 | End: 2024-07-09 | Stop reason: HOSPADM

## 2024-07-09 RX ORDER — LEVETIRACETAM 500 MG/1
750 TABLET ORAL 2 TIMES DAILY
Status: DISCONTINUED | OUTPATIENT
Start: 2024-07-09 | End: 2024-07-12 | Stop reason: HOSPADM

## 2024-07-09 RX ORDER — GABAPENTIN 250 MG/5ML
100 SOLUTION ORAL 3 TIMES DAILY
Status: COMPLETED | OUTPATIENT
Start: 2024-07-09 | End: 2024-07-11

## 2024-07-09 RX ORDER — CHLORHEXIDINE GLUCONATE ORAL RINSE 1.2 MG/ML
15 SOLUTION DENTAL
Status: COMPLETED | OUTPATIENT
Start: 2024-07-09 | End: 2024-07-09

## 2024-07-09 RX ORDER — SCOLOPAMINE TRANSDERMAL SYSTEM 1 MG/1
1 PATCH, EXTENDED RELEASE TRANSDERMAL ONCE
Status: DISCONTINUED | OUTPATIENT
Start: 2024-07-09 | End: 2024-07-09

## 2024-07-09 RX ORDER — LABETALOL HYDROCHLORIDE 5 MG/ML
5 INJECTION, SOLUTION INTRAVENOUS
Status: DISCONTINUED | OUTPATIENT
Start: 2024-07-09 | End: 2024-07-09 | Stop reason: HOSPADM

## 2024-07-09 RX ORDER — SODIUM CHLORIDE 0.9 % (FLUSH) 0.9 %
3-10 SYRINGE (ML) INJECTION AS NEEDED
Status: DISCONTINUED | OUTPATIENT
Start: 2024-07-09 | End: 2024-07-09 | Stop reason: HOSPADM

## 2024-07-09 RX ORDER — SODIUM CHLORIDE 9 MG/ML
40 INJECTION, SOLUTION INTRAVENOUS AS NEEDED
Status: DISCONTINUED | OUTPATIENT
Start: 2024-07-09 | End: 2024-07-12 | Stop reason: HOSPADM

## 2024-07-09 RX ORDER — PROCHLORPERAZINE MALEATE 10 MG
10 TABLET ORAL EVERY 6 HOURS PRN
Status: DISCONTINUED | OUTPATIENT
Start: 2024-07-09 | End: 2024-07-12 | Stop reason: HOSPADM

## 2024-07-09 RX ORDER — SODIUM CHLORIDE 0.9 % (FLUSH) 0.9 %
10 SYRINGE (ML) INJECTION EVERY 12 HOURS SCHEDULED
Status: DISCONTINUED | OUTPATIENT
Start: 2024-07-09 | End: 2024-07-12 | Stop reason: HOSPADM

## 2024-07-09 RX ORDER — ENOXAPARIN SODIUM 100 MG/ML
40 INJECTION SUBCUTANEOUS ONCE
Status: DISCONTINUED | OUTPATIENT
Start: 2024-07-09 | End: 2024-07-09 | Stop reason: HOSPADM

## 2024-07-09 RX ORDER — NALOXONE HCL 0.4 MG/ML
0.1 VIAL (ML) INJECTION
Status: DISCONTINUED | OUTPATIENT
Start: 2024-07-09 | End: 2024-07-12 | Stop reason: HOSPADM

## 2024-07-09 RX ORDER — NICOTINE POLACRILEX 4 MG
15 LOZENGE BUCCAL
Status: DISCONTINUED | OUTPATIENT
Start: 2024-07-09 | End: 2024-07-12 | Stop reason: HOSPADM

## 2024-07-09 RX ORDER — ALPRAZOLAM 0.25 MG/1
0.25 TABLET ORAL 2 TIMES DAILY PRN
Status: DISCONTINUED | OUTPATIENT
Start: 2024-07-09 | End: 2024-07-12 | Stop reason: HOSPADM

## 2024-07-09 RX ADMIN — ACETAMINOPHEN 1000 MG: 500 TABLET ORAL at 06:45

## 2024-07-09 RX ADMIN — SODIUM CHLORIDE, POTASSIUM CHLORIDE, SODIUM LACTATE AND CALCIUM CHLORIDE 150 ML/HR: 600; 310; 30; 20 INJECTION, SOLUTION INTRAVENOUS at 18:56

## 2024-07-09 RX ADMIN — ACETAMINOPHEN 1000 MG: 500 TABLET ORAL at 15:14

## 2024-07-09 RX ADMIN — AMISULPRIDE 10 MG: 2.5 INJECTION, SOLUTION INTRAVENOUS at 08:57

## 2024-07-09 RX ADMIN — FENTANYL CITRATE 50 MCG: 50 INJECTION, SOLUTION INTRAMUSCULAR; INTRAVENOUS at 10:06

## 2024-07-09 RX ADMIN — LIDOCAINE HYDROCHLORIDE 50 MG: 10 INJECTION, SOLUTION EPIDURAL; INFILTRATION; INTRACAUDAL; PERINEURAL at 07:33

## 2024-07-09 RX ADMIN — SODIUM CHLORIDE 1000 ML: 900 INJECTION INTRAVENOUS at 06:49

## 2024-07-09 RX ADMIN — BUPIVACAINE HYDROCHLORIDE 60 ML: 2.5 INJECTION, SOLUTION EPIDURAL; INFILTRATION; INTRACAUDAL; PERINEURAL at 07:36

## 2024-07-09 RX ADMIN — GABAPENTIN 600 MG: 300 CAPSULE ORAL at 06:45

## 2024-07-09 RX ADMIN — GABAPENTIN 100 MG: 100 CAPSULE ORAL at 16:53

## 2024-07-09 RX ADMIN — INSULIN LISPRO 5 UNITS: 100 INJECTION, SOLUTION INTRAVENOUS; SUBCUTANEOUS at 16:53

## 2024-07-09 RX ADMIN — HYDROMORPHONE HYDROCHLORIDE 0.5 MG: 1 INJECTION, SOLUTION INTRAMUSCULAR; INTRAVENOUS; SUBCUTANEOUS at 09:32

## 2024-07-09 RX ADMIN — SODIUM CHLORIDE: 9 INJECTION, SOLUTION INTRAVENOUS at 07:30

## 2024-07-09 RX ADMIN — METHOCARBAMOL 500 MG: 500 TABLET ORAL at 16:53

## 2024-07-09 RX ADMIN — SCOPOLAMINE 1 PATCH: 1.5 PATCH, EXTENDED RELEASE TRANSDERMAL at 06:44

## 2024-07-09 RX ADMIN — INSULIN LISPRO 5 UNITS: 100 INJECTION, SOLUTION INTRAVENOUS; SUBCUTANEOUS at 11:36

## 2024-07-09 RX ADMIN — PRAZOSIN HYDROCHLORIDE 5 MG: 1 CAPSULE ORAL at 21:04

## 2024-07-09 RX ADMIN — MIDAZOLAM HYDROCHLORIDE 2 MG: 1 INJECTION, SOLUTION INTRAMUSCULAR; INTRAVENOUS at 09:31

## 2024-07-09 RX ADMIN — OXYCODONE HYDROCHLORIDE 5 MG: 5 TABLET ORAL at 21:04

## 2024-07-09 RX ADMIN — SODIUM CHLORIDE, POTASSIUM CHLORIDE, SODIUM LACTATE AND CALCIUM CHLORIDE 150 ML/HR: 600; 310; 30; 20 INJECTION, SOLUTION INTRAVENOUS at 11:23

## 2024-07-09 RX ADMIN — LEVETIRACETAM 750 MG: 500 TABLET, FILM COATED ORAL at 20:08

## 2024-07-09 RX ADMIN — HYDROMORPHONE HYDROCHLORIDE 0.5 MG: 1 INJECTION, SOLUTION INTRAMUSCULAR; INTRAVENOUS; SUBCUTANEOUS at 09:48

## 2024-07-09 RX ADMIN — HYDROMORPHONE HYDROCHLORIDE 0.5 MG: 1 INJECTION, SOLUTION INTRAMUSCULAR; INTRAVENOUS; SUBCUTANEOUS at 15:14

## 2024-07-09 RX ADMIN — Medication 10 ML: at 11:23

## 2024-07-09 RX ADMIN — METHOCARBAMOL 500 MG: 500 TABLET ORAL at 20:08

## 2024-07-09 RX ADMIN — ROCURONIUM BROMIDE 20 MG: 10 INJECTION INTRAVENOUS at 08:07

## 2024-07-09 RX ADMIN — FENTANYL CITRATE 50 MCG: 50 INJECTION, SOLUTION INTRAMUSCULAR; INTRAVENOUS at 08:05

## 2024-07-09 RX ADMIN — Medication 1 MG: at 08:31

## 2024-07-09 RX ADMIN — CHLORHEXIDINE GLUCONATE 0.12% ORAL RINSE 15 ML: 1.2 LIQUID ORAL at 06:45

## 2024-07-09 RX ADMIN — SODIUM CHLORIDE 2 G: 900 INJECTION INTRAVENOUS at 15:15

## 2024-07-09 RX ADMIN — ACETAMINOPHEN 1000 MG: 500 TABLET ORAL at 20:09

## 2024-07-09 RX ADMIN — PANTOPRAZOLE SODIUM 40 MG: 40 INJECTION, POWDER, FOR SOLUTION INTRAVENOUS at 06:49

## 2024-07-09 RX ADMIN — CHLORHEXIDINE GLUCONATE 0.12% ORAL RINSE 15 ML: 1.2 LIQUID ORAL at 06:46

## 2024-07-09 RX ADMIN — ROCURONIUM BROMIDE 50 MG: 10 INJECTION INTRAVENOUS at 07:33

## 2024-07-09 RX ADMIN — DEXAMETHASONE SODIUM PHOSPHATE 4 MG: 10 INJECTION, SOLUTION INTRAMUSCULAR; INTRAVENOUS at 07:36

## 2024-07-09 RX ADMIN — INSULIN LISPRO 3 UNITS: 100 INJECTION, SOLUTION INTRAVENOUS; SUBCUTANEOUS at 21:05

## 2024-07-09 RX ADMIN — ONDANSETRON 4 MG: 2 INJECTION INTRAMUSCULAR; INTRAVENOUS at 07:43

## 2024-07-09 RX ADMIN — FENTANYL CITRATE 50 MCG: 50 INJECTION, SOLUTION INTRAMUSCULAR; INTRAVENOUS at 07:33

## 2024-07-09 RX ADMIN — SUGAMMADEX 200 MG: 100 INJECTION, SOLUTION INTRAVENOUS at 09:02

## 2024-07-09 RX ADMIN — BUDESONIDE AND FORMOTEROL FUMARATE DIHYDRATE 2 PUFF: 160; 4.5 AEROSOL RESPIRATORY (INHALATION) at 21:15

## 2024-07-09 RX ADMIN — GABAPENTIN 100 MG: 100 CAPSULE ORAL at 20:08

## 2024-07-09 RX ADMIN — PANTOPRAZOLE SODIUM 40 MG: 40 INJECTION, POWDER, FOR SOLUTION INTRAVENOUS at 11:23

## 2024-07-09 RX ADMIN — THIAMINE HYDROCHLORIDE 100 MG: 100 INJECTION, SOLUTION INTRAMUSCULAR; INTRAVENOUS at 11:36

## 2024-07-09 RX ADMIN — HYDROMORPHONE HYDROCHLORIDE 0.5 MG: 1 INJECTION, SOLUTION INTRAMUSCULAR; INTRAVENOUS; SUBCUTANEOUS at 10:11

## 2024-07-09 RX ADMIN — DEXAMETHASONE SODIUM PHOSPHATE 8 MG: 4 INJECTION INTRA-ARTICULAR; INTRALESIONAL; INTRAMUSCULAR; INTRAVENOUS; SOFT TISSUE at 07:33

## 2024-07-09 RX ADMIN — SODIUM CHLORIDE 2 G: 900 INJECTION INTRAVENOUS at 07:40

## 2024-07-09 RX ADMIN — PROPOFOL 200 MG: 10 INJECTION, EMULSION INTRAVENOUS at 07:33

## 2024-07-09 NOTE — PLAN OF CARE
Goal Outcome Evaluation:           Progress: no change  Outcome Evaluation: Patient arrived on the unit this morning after having undergone a gastric sleeve and EGD with Dr. Cerna. Patient has had a decent shift, sleeping on and off since arriving on the unit. VSS, and patient has been alert and oriented times four (patient was confused when she first arrived, but is A&Ox4 at this time). Patient has asked for pain medications once thus far for pain related to her recent surgical procedure. Fiance at bedside. Nursing staff will continue to monitor and assess the patient.

## 2024-07-09 NOTE — ANESTHESIA PREPROCEDURE EVALUATION
Anesthesia Evaluation     history of anesthetic complications:  PONV               Airway   Mallampati: I  TM distance: >3 FB  Neck ROM: full  No difficulty expected  Dental      Pulmonary    (+) asthma,sleep apnea  Cardiovascular     ECG reviewed    (+) hyperlipidemia      Neuro/Psych  (+) seizures, headaches, psychiatric history  GI/Hepatic/Renal/Endo    (+) obesity, GERD, liver disease, diabetes mellitus    Musculoskeletal     (+) back pain  Abdominal    Substance History      OB/GYN          Other                    Anesthesia Plan    ASA 3     general     (tap)  intravenous induction     Anesthetic plan, risks, benefits, and alternatives have been provided, discussed and informed consent has been obtained with: patient.    Plan discussed with CRNA.    CODE STATUS:

## 2024-07-09 NOTE — ANESTHESIA PROCEDURE NOTES
"Peripheral Block      Patient reassessed immediately prior to procedure    Patient location during procedure: OR  Start time: 7/9/2024 7:36 AM  Reason for block: at surgeon's request and post-op pain management  Performed by  Anesthesiologist: Marcus Gifford MD  CRNA/CAA: Tory Hilton CRNA  Preanesthetic Checklist  Completed: patient identified, IV checked, site marked, risks and benefits discussed, surgical consent, monitors and equipment checked, pre-op evaluation and timeout performed  Prep:  Pt Position: supine  Sterile barriers:cap, gloves, mask and washed/disinfected hands  Prep: ChloraPrep  Patient monitoring: blood pressure monitoring, continuous pulse oximetry and EKG  Procedure    Sedation: yes  Performed under: general  Guidance:ultrasound guided  Images:still images obtained, printed/placed on chart    Laterality:Bilateral  Block Type:TAP  Injection Technique:single-shot  Needle Type:short-bevel and echogenic  Needle Gauge:20 G  Resistance on Injection: none    Medications Used: dexamethasone sodium phosphate injection - Injection   4 mg - 7/9/2024 7:36:00 AM  bupivacaine PF (MARCAINE) 0.25 % injection - Injection   60 mL - 7/9/2024 7:36:00 AM      Medications  Comment:Block Injection:  LA dose divided between Right and Left block        Post Assessment  Injection Assessment: negative aspiration for heme, incremental injection and no paresthesia on injection  Patient Tolerance:comfortable throughout block  Complications:no  Additional Notes    Subcostal TAPs    A high-frequency linear transducer, with sterile cover, was placed sub-xiphoid to identify [place]ba, right and left Rectus Abdominus Muscles (JACE). The transducer was moved either right or left subcostally to identify the JACE and the Transverse Abdominus Muscle (MAGAÑA). The insertion site was prepped in sterile fashion and then localized with 2-5 ml of 1% Lidocaine. Using ultrasound-guidance, a 20-gauge B-Gay 4\" Ultraplex 360 " non-stimulating echogenic needle was advanced in plane, from medial to lateral, until the tip of the needle was in the fascial plane between the JACE and MAGAÑA. 1-3ml of preservative free normal saline was used to hydro-dissect the fascial planes. After the fascial plane was verified, the local anesthetic (LA) was injected. The procedure was repeated on the opposite side for bilateral coverage. Aspiration every 5 ml to prevent intravascular injection. Injection was completed with negative aspiration of blood and negative intravascular injection. Injection pressures were normal with minimal resistance. The subcostal approach to the TAP nerve block ideally anesthetizes the intercostal nerves T6-T9.     Performed by: Tory Hilton CRNA

## 2024-07-09 NOTE — ANESTHESIA POSTPROCEDURE EVALUATION
Patient: Merle Muir    Procedure Summary       Date: 07/09/24 Room / Location:  [facility] / [facility]    Anesthesia Start: 0730 Anesthesia Stop: 0922    Procedure: GASTRIC SLEEVE LAPAROSCOPIC WITH DAVINCI ROBOT AND ESOPHAGOGASTRODUODENOSCOPY Diagnosis:       Obesity, Class III, BMI 40-49.9 (morbid obesity)      (Obesity, Class III, BMI 40-49.9 (morbid obesity) [E66.01])    Surgeons: Kelsey Cerna MD Provider: Marcus Gifford MD    Anesthesia Type: general ASA Status: 3            Anesthesia Type: general    Vitals  Vitals Value Taken Time   BP     Temp     Pulse 91 07/09/24 0922   Resp     SpO2 93 % 07/09/24 0922   Vitals shown include unfiled device data.    /82, T 97.3, RR 13    Post Anesthesia Care and Evaluation    Patient location during evaluation: PACU  Patient participation: complete - patient participated  Level of consciousness: awake and alert  Pain score: 0  Pain management: adequate    Airway patency: patent  Anesthetic complications: No anesthetic complications  PONV Status: none  Cardiovascular status: hemodynamically stable and acceptable  Respiratory status: nonlabored ventilation, acceptable and nasal cannula  Hydration status: acceptable

## 2024-07-09 NOTE — OP NOTE
OPERATIVE REPORT    DATE: 07/09/24    PATIENT: Merle Muir    PREOPERATIVE DIAGNOSIS:    1. Morbid obesity with comorbidities    POSTOPERATIVE DIAGNOSIS:    1. Morbid obesity with multiple comorbidities.    PROCEDURES PERFORMED:  1. Robotic assisted laparoscopic sleeve gastrectomy (85% subtotal vertical gastrectomy)    2.  Esophagogastroduodenoscopy    SURGEON:  Kelsey Cerna M.D.    ASSISTANT: JENNIFER Atkins was instrumental in the success of the case and provided retraction, suction, camera holding, and skin closure.    ANESTHESIA:  General endotracheal with TAP block    ESTIMATED BLOOD LOSS:   minimal    FLUIDS:  Crystalloids.    SPECIMENS:  Subtotal gastrectomy.    DRAINS:  None.    COUNTS:  Correct.    COMPLICATIONS:  None.    FINDINGS: No hiatal hernia. Microvesicular steatosis.    INDICATIONS:   Merle Muir is a 43 y.o. female with morbid obesity and associated comorbidities who presents for elective laparoscopic sleeve gastrectomy. The patient has undergone our extensive preoperative education, teaching, and consent process. Everything is in order and they wish to proceed.         DESCRIPTION OF PROCEDURE:   The patient was brought to the operating room and placed supine upon the operating room table. SCDs were placed. The patient underwent uneventful general endotracheal anesthesia and bilateral TAP blocks per the anesthesiology staff.  She received subcutaneous Lovenox and was given Ancef. The abdomen was prepped with ChloraPrep and draped in the usual sterile fashion. An Ioban was used as well.  Timeout was performed.     A small transverse incision was made a few centimeters above and to the left of the umbilicus and the peritoneal cavity entered under direct camera visualization using an 11 mm 0° laparoscope and an OptiView trocar.  The abdomen was then insufflated to a pressure of 16 mmHg with CO2 gas. Exploratory laparoscopy revealed no evidence of injury from the  entrance technique. The gallbladder was absent.  The liver microvesicular steatosis and was normal in size without evidence of gross hepatomegaly or fibrosis.  Two 8 mm ports were placed to the patient's left, lateral of the  first port, and a 12 mm port was placed in the right upper quadrant.  The robot was docked, all safety checks were performed, and I moved to the robot console.     A 2-0 Stratafix suture was placed to make a liver sling with bites of the peritoneum and the right mey of the diaphragm, and the left lobe of the liver was elevated. The stomach was decompressed with an 18 [place]ian orogastric tube, which was then positioned in the antrum. The greater curvature vessels were taken down with the vessel sealer energy device beginning at the midpoint of the stomach and continued up to the angle of His, including the short gastrics. The left mey was completely exposed and there was no sign of hiatal hernia here or anteriorly. This was photodocumented. The GE junction fat pad was elevated to make a clear landing zone for the stapler later. The greater curvature vessels were taken down with the energy device extending distally within 3 cm of the pylorus. Filmy adhesions to the stomach were taken down posteriorly.      A 36 [place]ib[place]ian bougie was inserted and positioned along the lesser curvature of the stomach.  The stomach was marked at 1 cm from the angle of His, 3 cm from the incisura, and 5 cm from the pylorus using an ink saturated Kittner.  1 mg of IV glucagon was given to relax gastric smooth muscle.  Using the bougie as a template, a vertical sleeve gastrectomy was fashioned with successive staple loads: the first load was blue, and the following 5 loads were white.   The staple line was examined and was hemostatic. The gastrectomy specimen was removed through the 12 mm port site. The specimen was later examined, and the staples were well-formed. Palpation of the specimen revealed no masses. The staple  line of the sleeve gastrectomy revealed staples that were well-formed. The upper abdomen was flooded with saline, and endoscopy was performed.     The flexible endoscope was passed transorally down to the pylorus easily. The sleeve extended to within 6 cm proximal to the pylorus and was hemostatic. The sleeve was not narrow or twisted. There was no hiatal hernia or distal esophagitis. The rest of the esophagus was normal. The scope was removed. The leak test was normal.        I rescrubbed and suctioned the irrigation fluid from the upper abdomen, making sure it was dry. The staple line on the stomach was hemostatic.  The staple line was treated with 4 ml of aerosolized Tisseel fibrin glue. The liver stitch was removed easily. The 12 mm port was removed under direct visualization and there was no bleeding. This incision was closed with 0-Vicryl transfascial suture using a suture passer under direct visualization in a horizontal mattress fashion. All other ports were removed and then replaced under direct visualization and all of these were hemostatic. The instruments were removed and the abdomen was desufflated. The ports were removed.  3-0 Monocryl plus was used to close skin incisions with interrupted sutures. Skin glue was placed. 10 mg of IV Barhemsys was given at the end of the case.  The patient was awakened and taken to recovery in good condition, having tolerated the procedure well. All sponge, needle, and instrument counts were correct.

## 2024-07-09 NOTE — CASE MANAGEMENT/SOCIAL WORK
Discharge Planning Assessment  [facility]     Patient Name: Merle Muir  MRN: [id number]  Today's Date: 7/9/2024    Admit Date: 7/9/2024    Plan: home   Discharge Needs Assessment       Row Name 07/09/24 1116       Living Environment    People in Home significant other    Current Living Arrangements other (see comments)  mobile home    Primary Care Provided by self       Transition Planning    Patient/Family Anticipates Transition to home with family       Discharge Needs Assessment    Readmission Within the Last 30 Days no previous admission in last 30 days    Equipment Currently Used at Home cpap    Concerns to be Addressed basic needs;discharge planning                   Discharge Plan       Row Name 07/09/24 1116       Plan    Plan home    Patient/Family in Agreement with Plan yes    Plan Comments I met with this patient and her SO bedside. [facility] [place] in [facility]. She is independent with activities of daily living and mobility. She uses a cpap at home. She anticipates returning home after this hospitalization, and her SO can transport. Case management will follow.    Final Discharge Disposition Code 01 - home or self-care                  Continued Care and Services - Admitted Since 7/9/2024    No active coordination exists for this encounter.       Expected Discharge Date and Time       Expected Discharge Date Expected Discharge Time    Jul 11, 2024            Demographic Summary       Row Name 07/09/24 1115       General Information    General Information Comments I confirmed that Kush Marion is Ms Muir's PCP and she has [employer] [place]                   Functional Status       Row Name 07/09/24 1115       Functional Status, IADL    Medications independent    Meal Preparation independent    Housekeeping independent    Laundry independent    Shopping independent                   Psychosocial    No documentation.                  Abuse/Neglect    No documentation.                   Legal    No documentation.                  Substance Abuse    No documentation.                  Patient Forms    No documentation.                     Valeri Cardona RN

## 2024-07-09 NOTE — ANESTHESIA PROCEDURE NOTES
Airway  Urgency: elective    Date/Time: 7/9/2024 7:38 AM  Airway not difficult    General Information and Staff    Patient location during procedure: OR  CRNA/CAA: Elizabeth Duckworth CRNA    Indications and Patient Condition  Indications for airway management: airway protection    Preoxygenated: yes  MILS not maintained throughout  Mask difficulty assessment: 1 - vent by mask    Final Airway Details  Final airway type: endotracheal airway      Successful airway: ETT  Cuffed: yes   Successful intubation technique: video laryngoscopy  Facilitating devices/methods: Bougie and cricoid pressure  Endotracheal tube insertion site: oral  Blade: Brad  Blade size: 3  ETT size (mm): 7.5  Cormack-Lehane Classification: grade IIa - partial view of glottis  Placement verified by: chest auscultation and capnometry   Measured from: lips  ETT/EBT  to lips (cm): 20  Number of attempts at approach: 1  Assessment: lips, teeth, and gum same as pre-op and atraumatic intubation    Additional Comments  Negative epigastric sounds, Breath sound equal bilaterally with symmetric chest rise and fall

## 2024-07-09 NOTE — BRIEF OP NOTE
GASTRIC SLEEVE LAPAROSCOPIC WITH DAVINCI ROBOT AND ESOPHAGOGASTRODUODENOSCOPY  Progress Note    Merle Muir  7/9/2024    Pre-op Diagnosis:   Obesity, Class III, BMI 40-49.9 (morbid obesity) [E66.01]       Post-Op Diagnosis Codes:     * Obesity, Class III, BMI 40-49.9 (morbid obesity) [E66.01]    Procedure/CPT® Codes:  NC LAPS GSTRC RSTRICTIV PX LONGITUDINAL GASTRECTOMY [30517]  NC ESOPHAGOGASTRODUODENOSCOPY TRANSORAL DIAGNOSTIC [16765]      Procedure(s):  GASTRIC SLEEVE LAPAROSCOPIC WITH DAVINCI ROBOT AND ESOPHAGOGASTRODUODENOSCOPY              Surgeon(s):  Kelsey Cerna MD    Anesthesia: General with Block    Staff:   Circulator: Al Ji RN  Scrub Person: Renetta Torres  Nursing Assistant: Sandra Horne CNA  Assistant: Jordy Breen PA-C  Assistant: Jordy Breen PA-C      Estimated Blood Loss: minimal    Urine Voided: * No values recorded between 7/9/2024  7:30 AM and 7/9/2024  9:16 AM *    Specimens:                Specimens       ID Source Type Tests Collected By Collected At Frozen?    A Stomach Tissue TISSUE PATHOLOGY EXAM   Kelsey Cerna MD 7/9/24 0901 No    Description: SUB-TOTAL GASTRECTOMY                  Drains: * No LDAs found *    Findings: No hiatal hernia. Microvesicular steatosis.        Complications: None immediate.    Assistant: Jordy Breen PA-C  was responsible for performing the following activities: Retraction, Suction, Irrigation, Suturing, Closing, Placing Dressing, and Held/Positioned Camera and their skilled assistance was necessary for the success of this case.    Kelsey Cerna MD     Date: 7/9/2024  Time: 09:24 EDT

## 2024-07-10 ENCOUNTER — APPOINTMENT (OUTPATIENT)
Dept: GENERAL RADIOLOGY | Facility: HOSPITAL | Age: 44
End: 2024-07-10
Payer: MEDICAID

## 2024-07-10 LAB
ABO GROUP BLD: NORMAL
ALBUMIN SERPL-MCNC: 3.5 G/DL (ref 3.5–5.2)
ALBUMIN/GLOB SERPL: 1.3 G/DL
ALP SERPL-CCNC: 83 U/L (ref 39–117)
ALT SERPL W P-5'-P-CCNC: 30 U/L (ref 1–33)
ANION GAP SERPL CALCULATED.3IONS-SCNC: 9 MMOL/L (ref 5–15)
AST SERPL-CCNC: 18 U/L (ref 1–32)
BASOPHILS # BLD AUTO: 0 10*3/MM3 (ref 0–0.2)
BASOPHILS NFR BLD AUTO: 0 % (ref 0–1.5)
BILIRUB SERPL-MCNC: 0.3 MG/DL (ref 0–1.2)
BLD GP AB SCN SERPL QL: NEGATIVE
BUN SERPL-MCNC: 7 MG/DL (ref 6–20)
BUN/CREAT SERPL: 12.5 (ref 7–25)
CALCIUM SPEC-SCNC: 8.3 MG/DL (ref 8.6–10.5)
CHLORIDE SERPL-SCNC: 106 MMOL/L (ref 98–107)
CO2 SERPL-SCNC: 22 MMOL/L (ref 22–29)
CREAT SERPL-MCNC: 0.56 MG/DL (ref 0.57–1)
DEPRECATED RDW RBC AUTO: 41.7 FL (ref 37–54)
EGFRCR SERPLBLD CKD-EPI 2021: 117.8 ML/MIN/1.73
EOSINOPHIL # BLD AUTO: 0.01 10*3/MM3 (ref 0–0.4)
EOSINOPHIL NFR BLD AUTO: 0.1 % (ref 0.3–6.2)
ERYTHROCYTE [DISTWIDTH] IN BLOOD BY AUTOMATED COUNT: 14.4 % (ref 12.3–15.4)
GLOBULIN UR ELPH-MCNC: 2.8 GM/DL
GLUCOSE BLDC GLUCOMTR-MCNC: 123 MG/DL (ref 70–130)
GLUCOSE BLDC GLUCOMTR-MCNC: 148 MG/DL (ref 70–130)
GLUCOSE BLDC GLUCOMTR-MCNC: 164 MG/DL (ref 70–130)
GLUCOSE BLDC GLUCOMTR-MCNC: 167 MG/DL (ref 70–130)
GLUCOSE SERPL-MCNC: 165 MG/DL (ref 65–99)
HCT VFR BLD AUTO: 30.3 % (ref 34–46.6)
HGB BLD-MCNC: 9 G/DL (ref 12–15.9)
HGB BLD-MCNC: 9.3 G/DL (ref 12–15.9)
HGB BLD-MCNC: 9.3 G/DL (ref 12–15.9)
HGB BLD-MCNC: 9.9 G/DL (ref 12–15.9)
IMM GRANULOCYTES # BLD AUTO: 0.02 10*3/MM3 (ref 0–0.05)
IMM GRANULOCYTES NFR BLD AUTO: 0.3 % (ref 0–0.5)
IRON 24H UR-MRATE: 27 MCG/DL (ref 37–145)
LARGE PLATELETS: NORMAL
LYMPHOCYTES # BLD AUTO: 0.97 10*3/MM3 (ref 0.7–3.1)
LYMPHOCYTES NFR BLD AUTO: 12.9 % (ref 19.6–45.3)
MCH RBC QN AUTO: 26 PG (ref 26.6–33)
MCHC RBC AUTO-ENTMCNC: 32.7 G/DL (ref 31.5–35.7)
MCV RBC AUTO: 79.5 FL (ref 79–97)
MONOCYTES # BLD AUTO: 0.62 10*3/MM3 (ref 0.1–0.9)
MONOCYTES NFR BLD AUTO: 8.2 % (ref 5–12)
NEUTROPHILS NFR BLD AUTO: 5.91 10*3/MM3 (ref 1.7–7)
NEUTROPHILS NFR BLD AUTO: 78.5 % (ref 42.7–76)
NRBC BLD AUTO-RTO: 0 /100 WBC (ref 0–0.2)
PLATELET # BLD AUTO: 199 10*3/MM3 (ref 140–450)
PMV BLD AUTO: 11.3 FL (ref 6–12)
POTASSIUM SERPL-SCNC: 4 MMOL/L (ref 3.5–5.2)
PROT SERPL-MCNC: 6.3 G/DL (ref 6–8.5)
RBC # BLD AUTO: 3.81 10*6/MM3 (ref 3.77–5.28)
RBC MORPH BLD: NORMAL
RH BLD: POSITIVE
SODIUM SERPL-SCNC: 137 MMOL/L (ref 136–145)
T&S EXPIRATION DATE: NORMAL
WBC MORPH BLD: NORMAL
WBC NRBC COR # BLD AUTO: 7.53 10*3/MM3 (ref 3.4–10.8)

## 2024-07-10 PROCEDURE — 83540 ASSAY OF IRON: CPT | Performed by: SURGERY

## 2024-07-10 PROCEDURE — 86900 BLOOD TYPING SEROLOGIC ABO: CPT | Performed by: SURGERY

## 2024-07-10 PROCEDURE — 94799 UNLISTED PULMONARY SVC/PX: CPT

## 2024-07-10 PROCEDURE — 25010000002 ONDANSETRON PER 1 MG: Performed by: SURGERY

## 2024-07-10 PROCEDURE — 74240 X-RAY XM UPR GI TRC 1CNTRST: CPT

## 2024-07-10 PROCEDURE — 25010000002 CYANOCOBALAMIN PER 1000 MCG: Performed by: SURGERY

## 2024-07-10 PROCEDURE — 25810000003 SODIUM CHLORIDE 0.9 % SOLUTION 1,000 ML FLEX CONT: Performed by: SURGERY

## 2024-07-10 PROCEDURE — 86901 BLOOD TYPING SEROLOGIC RH(D): CPT | Performed by: SURGERY

## 2024-07-10 PROCEDURE — 25010000002 NA FERRIC GLUC CPLX PER 12.5 MG: Performed by: SURGERY

## 2024-07-10 PROCEDURE — 25010000002 ENOXAPARIN PER 10 MG: Performed by: SURGERY

## 2024-07-10 PROCEDURE — 25010000002 POTASSIUM CHLORIDE PER 2 MEQ: Performed by: SURGERY

## 2024-07-10 PROCEDURE — 86850 RBC ANTIBODY SCREEN: CPT | Performed by: SURGERY

## 2024-07-10 PROCEDURE — 25010000002 HYDROMORPHONE PER 4 MG: Performed by: SURGERY

## 2024-07-10 PROCEDURE — 85007 BL SMEAR W/DIFF WBC COUNT: CPT | Performed by: SURGERY

## 2024-07-10 PROCEDURE — S2900 ROBOTIC SURGICAL SYSTEM: HCPCS | Performed by: SURGERY

## 2024-07-10 PROCEDURE — 85018 HEMOGLOBIN: CPT | Performed by: SURGERY

## 2024-07-10 PROCEDURE — 99024 POSTOP FOLLOW-UP VISIT: CPT | Performed by: SURGERY

## 2024-07-10 PROCEDURE — 82948 REAGENT STRIP/BLOOD GLUCOSE: CPT

## 2024-07-10 PROCEDURE — 85025 COMPLETE CBC W/AUTO DIFF WBC: CPT | Performed by: SURGERY

## 2024-07-10 PROCEDURE — 80053 COMPREHEN METABOLIC PANEL: CPT | Performed by: SURGERY

## 2024-07-10 PROCEDURE — 25010000002 CEFAZOLIN PER 500 MG: Performed by: SURGERY

## 2024-07-10 PROCEDURE — 63710000001 INSULIN LISPRO (HUMAN) PER 5 UNITS: Performed by: SURGERY

## 2024-07-10 PROCEDURE — 25010000002 THIAMINE PER 100 MG: Performed by: SURGERY

## 2024-07-10 PROCEDURE — 0 DIATRIZOATE MEGLUMINE & SODIUM PER 1 ML: Performed by: SURGERY

## 2024-07-10 RX ORDER — ENOXAPARIN SODIUM 100 MG/ML
40 INJECTION SUBCUTANEOUS EVERY 12 HOURS
Status: DISCONTINUED | OUTPATIENT
Start: 2024-07-10 | End: 2024-07-12 | Stop reason: HOSPADM

## 2024-07-10 RX ADMIN — BUDESONIDE AND FORMOTEROL FUMARATE DIHYDRATE 2 PUFF: 160; 4.5 AEROSOL RESPIRATORY (INHALATION) at 19:21

## 2024-07-10 RX ADMIN — INSULIN LISPRO 3 UNITS: 100 INJECTION, SOLUTION INTRAVENOUS; SUBCUTANEOUS at 08:18

## 2024-07-10 RX ADMIN — SIMETHICONE 80 MG: 80 TABLET, CHEWABLE ORAL at 16:12

## 2024-07-10 RX ADMIN — POTASSIUM CHLORIDE AND SODIUM CHLORIDE 100 ML/HR: 450; 150 INJECTION, SOLUTION INTRAVENOUS at 13:44

## 2024-07-10 RX ADMIN — HYDROMORPHONE HYDROCHLORIDE 0.5 MG: 1 INJECTION, SOLUTION INTRAMUSCULAR; INTRAVENOUS; SUBCUTANEOUS at 05:51

## 2024-07-10 RX ADMIN — FOLIC ACID 200 ML/HR: 5 INJECTION, SOLUTION INTRAMUSCULAR; INTRAVENOUS; SUBCUTANEOUS at 03:35

## 2024-07-10 RX ADMIN — GABAPENTIN 100 MG: 100 CAPSULE ORAL at 15:27

## 2024-07-10 RX ADMIN — METHOCARBAMOL 500 MG: 500 TABLET ORAL at 20:52

## 2024-07-10 RX ADMIN — ACETAMINOPHEN 1000 MG: 500 TABLET ORAL at 14:27

## 2024-07-10 RX ADMIN — ENOXAPARIN SODIUM 40 MG: 100 INJECTION SUBCUTANEOUS at 20:52

## 2024-07-10 RX ADMIN — GABAPENTIN 100 MG: 100 CAPSULE ORAL at 08:18

## 2024-07-10 RX ADMIN — ACETAMINOPHEN 1000 MG: 500 TABLET ORAL at 21:00

## 2024-07-10 RX ADMIN — LEVETIRACETAM 750 MG: 500 TABLET, FILM COATED ORAL at 20:52

## 2024-07-10 RX ADMIN — SODIUM CHLORIDE 125 MG: 9 INJECTION, SOLUTION INTRAVENOUS at 12:36

## 2024-07-10 RX ADMIN — METHOCARBAMOL 500 MG: 500 TABLET ORAL at 08:18

## 2024-07-10 RX ADMIN — GABAPENTIN 100 MG: 100 CAPSULE ORAL at 20:52

## 2024-07-10 RX ADMIN — SODIUM CHLORIDE 2 G: 900 INJECTION INTRAVENOUS at 00:15

## 2024-07-10 RX ADMIN — CARIPRAZINE 3 MG: 3 CAPSULE, GELATIN COATED ORAL at 08:17

## 2024-07-10 RX ADMIN — OXYCODONE HYDROCHLORIDE 5 MG: 5 TABLET ORAL at 18:23

## 2024-07-10 RX ADMIN — INSULIN LISPRO 3 UNITS: 100 INJECTION, SOLUTION INTRAVENOUS; SUBCUTANEOUS at 11:15

## 2024-07-10 RX ADMIN — ACETAMINOPHEN 1000 MG: 500 TABLET ORAL at 05:48

## 2024-07-10 RX ADMIN — LEVETIRACETAM 750 MG: 500 TABLET, FILM COATED ORAL at 08:18

## 2024-07-10 RX ADMIN — METHOCARBAMOL 500 MG: 500 TABLET ORAL at 15:27

## 2024-07-10 RX ADMIN — BUDESONIDE AND FORMOTEROL FUMARATE DIHYDRATE 2 PUFF: 160; 4.5 AEROSOL RESPIRATORY (INHALATION) at 07:48

## 2024-07-10 RX ADMIN — PANTOPRAZOLE SODIUM 40 MG: 40 TABLET, DELAYED RELEASE ORAL at 05:49

## 2024-07-10 RX ADMIN — HYDROMORPHONE HYDROCHLORIDE 2 MG: 2 TABLET ORAL at 04:28

## 2024-07-10 RX ADMIN — Medication 10 ML: at 21:00

## 2024-07-10 RX ADMIN — ONDANSETRON 4 MG: 2 INJECTION INTRAMUSCULAR; INTRAVENOUS at 04:28

## 2024-07-10 RX ADMIN — CYANOCOBALAMIN 1000 MCG: 1000 INJECTION, SOLUTION INTRAMUSCULAR at 08:17

## 2024-07-10 RX ADMIN — PRAZOSIN HYDROCHLORIDE 5 MG: 1 CAPSULE ORAL at 20:52

## 2024-07-10 NOTE — PLAN OF CARE
Goal Outcome Evaluation:  Plan of Care Reviewed With: patient        Progress: no change  Outcome Evaluation: VSS. RA-2LNC. NSR-sinus tachycardia on the monitor. A&O x4. PRNs administered for pain control. No complaints of nausea. Incisions clean, dry, and intact with no drainage. IVFs infusing. Abx administered. Pt voiding without difficulty. RN encouraging ambulation and IS use. Scds in place. Plan of care discussed with patient and spouse at bedside who express understanding. Pt resting comfortably at this time.

## 2024-07-10 NOTE — CASE MANAGEMENT/SOCIAL WORK
Continued Stay Note   [facility]     Patient Name: Merle Muir  MRN: [id number]  Today's Date: 7/10/2024    Admit Date: 7/9/2024    Plan: home   Discharge Plan       Row Name 07/10/24 0804       Plan    Plan home    Patient/Family in Agreement with Plan yes    Plan Comments I met with this patient and her plan remains home at discharge with her SO to transport. CM to follow.    Final Discharge Disposition Code 01 - home or self-care                   Discharge Codes    No documentation.                 Expected Discharge Date and Time       Expected Discharge Date Expected Discharge Time    Jul 11, 2024               Valeri Cardona RN

## 2024-07-10 NOTE — PLAN OF CARE
Goal Outcome Evaluation:  Plan of Care Reviewed With: patient        Progress: no change   VSS, RA, A/Ox4. Iron replaced. Lap sites CDI. IS and ambulation encouraged. Ambulated in halls a couple times. Poor PO intake. Voiding spontaneously. Has had some pain but denies need of PRN at this time. Will continue POC.   Problem: Adult Inpatient Plan of Care  Goal: Plan of Care Review  Outcome: Ongoing, Progressing  Flowsheets (Taken 7/10/2024 1459)  Progress: no change  Plan of Care Reviewed With: patient  Goal: Patient-Specific Goal (Individualized)  Outcome: Ongoing, Progressing  Goal: Absence of Hospital-Acquired Illness or Injury  Outcome: Ongoing, Progressing  Intervention: Identify and Manage Fall Risk  Recent Flowsheet Documentation  Taken 7/10/2024 1400 by Renetta Stroud RN  Safety Promotion/Fall Prevention:   clutter free environment maintained   activity supervised   assistive device/personal items within reach   fall prevention program maintained   room organization consistent   safety round/check completed   toileting scheduled   nonskid shoes/slippers when out of bed   lighting adjusted  Taken 7/10/2024 1200 by Renetta Stroud, RN  Safety Promotion/Fall Prevention:   clutter free environment maintained   activity supervised   assistive device/personal items within reach   fall prevention program maintained   toileting scheduled   safety round/check completed   room organization consistent   nonskid shoes/slippers when out of bed   lighting adjusted  Taken 7/10/2024 1000 by Renetta Stroud, RN  Safety Promotion/Fall Prevention:   activity supervised   assistive device/personal items within reach   clutter free environment maintained   fall prevention program maintained   room organization consistent   safety round/check completed   toileting scheduled   nonskid shoes/slippers when out of bed   lighting adjusted  Taken 7/10/2024 0800 by Renetta Stroud, RN  Safety Promotion/Fall Prevention:   assistive  device/personal items within reach   clutter free environment maintained   activity supervised   fall prevention program maintained   safety round/check completed   room organization consistent   toileting scheduled   nonskid shoes/slippers when out of bed   lighting adjusted  Intervention: Prevent Skin Injury  Recent Flowsheet Documentation  Taken 7/10/2024 1400 by Renetta Stroud RN  Body Position: supine  Taken 7/10/2024 1200 by Renetta Stroud RN  Body Position: position changed independently  Taken 7/10/2024 1000 by Renetta Stroud RN  Body Position: position changed independently  Taken 7/10/2024 0800 by Renetta Stroud RN  Body Position: position changed independently  Intervention: Prevent and Manage VTE (Venous Thromboembolism) Risk  Recent Flowsheet Documentation  Taken 7/10/2024 1448 by Renetta Stroud RN  Activity Management: ambulated outside room  Taken 7/10/2024 1400 by Renetta Stroud RN  Activity Management: activity encouraged  Taken 7/10/2024 1200 by Renetta Stroud RN  Activity Management: activity encouraged  Taken 7/10/2024 1000 by Renetta Stroud RN  Activity Management: activity encouraged  Taken 7/10/2024 0800 by Renetta Stroud RN  Activity Management: activity encouraged  VTE Prevention/Management:   bilateral   sequential compression devices on  Intervention: Prevent Infection  Recent Flowsheet Documentation  Taken 7/10/2024 1400 by Renetta Stroud RN  Infection Prevention:   rest/sleep promoted   single patient room provided   visitors restricted/screened   hand hygiene promoted  Taken 7/10/2024 1200 by Renetta Stroud RN  Infection Prevention:   single patient room provided   rest/sleep promoted   visitors restricted/screened   hand hygiene promoted  Taken 7/10/2024 1000 by Renetta Stroud RN  Infection Prevention:   rest/sleep promoted   single patient room provided   visitors restricted/screened   hand hygiene promoted  Taken 7/10/2024 0800 by Renetta Stroud RN  Infection Prevention:   single  patient room provided   rest/sleep promoted   visitors restricted/screened   hand hygiene promoted  Goal: Optimal Comfort and Wellbeing  Outcome: Ongoing, Progressing  Intervention: Monitor Pain and Promote Comfort  Recent Flowsheet Documentation  Taken 7/10/2024 0800 by Renetta Stroud RN  Pain Management Interventions: around-the-clock dosing utilized  Intervention: Provide Person-Centered Care  Recent Flowsheet Documentation  Taken 7/10/2024 0800 by Renetta Stroud RN  Trust Relationship/Rapport:   care explained   choices provided  Goal: Readiness for Transition of Care  Outcome: Ongoing, Progressing     Problem: Bariatric Care Environmental Safety (Bariatric Surgery)  Goal: Safety Maintained with Care  Outcome: Ongoing, Progressing     Problem: Bleeding (Bariatric Surgery)  Goal: Absence of Bleeding  Outcome: Ongoing, Progressing     Problem: Bowel Motility Impaired (Bariatric Surgery)  Goal: Effective Bowel Motility and Elimination  Outcome: Ongoing, Progressing     Problem: Fluid and Electrolyte Imbalance (Bariatric Surgery)  Goal: Fluid and Electrolyte Balance  Outcome: Ongoing, Progressing     Problem: Glycemic Control Impaired (Bariatric Surgery)  Goal: Blood Glucose Level Within Desired Range  Outcome: Ongoing, Progressing     Problem: Infection (Bariatric Surgery)  Goal: Absence of Infection Signs and Symptoms  Outcome: Ongoing, Progressing     Problem: Ongoing Anesthesia Effects (Bariatric Surgery)  Goal: Anesthesia/Sedation Recovery  Outcome: Ongoing, Progressing  Intervention: Optimize Anesthesia Recovery  Recent Flowsheet Documentation  Taken 7/10/2024 1400 by Renetta Stroud RN  Safety Promotion/Fall Prevention:   clutter free environment maintained   activity supervised   assistive device/personal items within reach   fall prevention program maintained   room organization consistent   safety round/check completed   toileting scheduled   nonskid shoes/slippers when out of bed   lighting adjusted  Taken  7/10/2024 1200 by Renetta Stroud RN  Safety Promotion/Fall Prevention:   clutter free environment maintained   activity supervised   assistive device/personal items within reach   fall prevention program maintained   toileting scheduled   safety round/check completed   room organization consistent   nonskid shoes/slippers when out of bed   lighting adjusted  Taken 7/10/2024 1000 by Renetta Stroud RN  Patient Tolerance (IS): poor  Safety Promotion/Fall Prevention:   activity supervised   assistive device/personal items within reach   clutter free environment maintained   fall prevention program maintained   room organization consistent   safety round/check completed   toileting scheduled   nonskid shoes/slippers when out of bed   lighting adjusted  Administration (IS): self-administered  Level Incentive Spirometer (mL): 500  Incentive Spirometer Predicted Level (mL): 2000  Number of Repetitions (IS): 5  Taken 7/10/2024 0800 by Renetta Stroud RN  Safety Promotion/Fall Prevention:   assistive device/personal items within reach   clutter free environment maintained   activity supervised   fall prevention program maintained   safety round/check completed   room organization consistent   toileting scheduled   nonskid shoes/slippers when out of bed   lighting adjusted     Problem: Pain (Bariatric Surgery)  Goal: Acceptable Pain Control  Outcome: Ongoing, Progressing  Intervention: Prevent or Manage Pain  Recent Flowsheet Documentation  Taken 7/10/2024 0800 by Renetta Stroud RN  Pain Management Interventions: around-the-clock dosing utilized  Diversional Activities:   smartphone   television     Problem: Postoperative Nausea and Vomiting (Bariatric Surgery)  Goal: Nausea and Vomiting Relief  Outcome: Ongoing, Progressing     Problem: Postoperative Urinary Retention (Bariatric Surgery)  Goal: Effective Urinary Elimination  Outcome: Ongoing, Progressing     Problem: Respiratory Compromise (Bariatric Surgery)  Goal: Effective  Oxygenation and Ventilation  Outcome: Ongoing, Progressing  Intervention: Optimize Oxygenation and Ventilation  Recent Flowsheet Documentation  Taken 7/10/2024 1400 by Renetta Stroud RN  Head of Bed (HOB) Positioning: HOB elevated  Taken 7/10/2024 1200 by Renetta Stroud RN  Head of Bed (HOB) Positioning: HOB elevated  Taken 7/10/2024 1000 by Renetta Stroud RN  Head of Bed (HOB) Positioning: HOB elevated  Taken 7/10/2024 0800 by Renetta Stroud RN  Head of Bed (HOB) Positioning: HOB elevated     Problem: Fall Injury Risk  Goal: Absence of Fall and Fall-Related Injury  Outcome: Ongoing, Progressing  Intervention: Identify and Manage Contributors  Recent Flowsheet Documentation  Taken 7/10/2024 1400 by Renetta Stroud RN  Medication Review/Management: medications reviewed  Taken 7/10/2024 1200 by Renetta Stroud RN  Medication Review/Management: medications reviewed  Taken 7/10/2024 1000 by Renetta Stroud RN  Medication Review/Management: medications reviewed  Taken 7/10/2024 0800 by Renetta Stroud RN  Medication Review/Management: medications reviewed  Intervention: Promote Injury-Free Environment  Recent Flowsheet Documentation  Taken 7/10/2024 1400 by Renetta Stroud RN  Safety Promotion/Fall Prevention:   clutter free environment maintained   activity supervised   assistive device/personal items within reach   fall prevention program maintained   room organization consistent   safety round/check completed   toileting scheduled   nonskid shoes/slippers when out of bed   lighting adjusted  Taken 7/10/2024 1200 by Renetta Stroud RN  Safety Promotion/Fall Prevention:   clutter free environment maintained   activity supervised   assistive device/personal items within reach   fall prevention program maintained   toileting scheduled   safety round/check completed   room organization consistent   nonskid shoes/slippers when out of bed   lighting adjusted  Taken 7/10/2024 1000 by Renetta Stroud RN  Safety Promotion/Fall  Prevention:   activity supervised   assistive device/personal items within reach   clutter free environment maintained   fall prevention program maintained   room organization consistent   safety round/check completed   toileting scheduled   nonskid shoes/slippers when out of bed   lighting adjusted  Taken 7/10/2024 0800 by Renetta Stroud, RN  Safety Promotion/Fall Prevention:   assistive device/personal items within reach   clutter free environment maintained   activity supervised   fall prevention program maintained   safety round/check completed   room organization consistent   toileting scheduled   nonskid shoes/slippers when out of bed   lighting adjusted

## 2024-07-10 NOTE — PROGRESS NOTES
"Bariatric Surgery     LOS: 1 day   Patient Care Team:  Kush Marion MD as PCP - General (Adolescent Medicine)  Rajiv Ingram MD as Consulting Physician (Cardiology)  Nahum Deleon MD as Consulting Physician (Neurology)    Chief Complaint:  post op    Subjective     Interval History:    In recovery room yesterday, she had emergence delirium, including combativeness, thrashing, hollering, and required IV Versed to calm down.  She has no memory of her recovery room experience.    This morning, Hgb dropped significantly.  VS overnight were not remarkable.  Transient tachycardia to 110.    Has had no reports of generalized tonic-clonic or absence seizure activity.    Doing fairly well this afternoon.  C/o headache and incisional pain.  C/o chest pain after drinking that improved with Gas-Ex.  Actually just now taking her first oral pain med of the day.   Tolerating PO. But has only had a few sips of protein shake. Denies N/V.  No fevers.  Pain controlled.  Ambulating.  Voiding.   observed with great encouragement.    LUE infiltrated, hand is swollen.    Objective     Vital Signs  Blood pressure 134/70, pulse 92, temperature 98.7 °F (37.1 °C), temperature source Oral, resp. rate 18, height 157.5 cm (62\"), weight 117 kg (257 lb 11.2 oz), SpO2 94%.    Physical Exam:  General: Alert, NAD  Abdomen: soft, appropriate, incisions okay.  Slightly distended.  No abdominal or flank bruising, no apparent rectus swelling.  No peritonitis.    Extremities: warm, (+) SCDs.  Left hand is swollen.  Normal sensation and capillary refill, but decreased  strength on left.     Results Review:     I reviewed the patient's new clinical results.  I reviewed the patient's new imaging results and agree with the interpretation.    Labs:  Lab Results (last 24 hours)       Procedure Component Value Units Date/Time    Hemoglobin [[id number]]  (Abnormal) Collected: 07/10/24 1107    Specimen: Blood Updated: 07/10/24 1117 "     Hemoglobin 9.3 g/dL     POC Glucose Once [[id number]]  (Abnormal) Collected: 07/10/24 1051    Specimen: Blood Updated: 07/10/24 1052     Glucose 167 mg/dL     POC Glucose Once [[id number]]  (Abnormal) Collected: 07/10/24 0707    Specimen: Blood Updated: 07/10/24 0709     Glucose 164 mg/dL     CBC & Differential [[id number]]  (Abnormal) Collected: 07/10/24 0533    Specimen: Blood Updated: 07/10/24 0648    Narrative:      The following orders were created for panel order CBC & Differential.  Procedure                               Abnormality         Status                     ---------                               -----------         ------                     CBC Auto Differential[[id number]]        Abnormal            Final result               Scan Slide[[id number]]                                       Final result                 Please view results for these tests on the individual orders.    CBC Auto Differential [[id number]]  (Abnormal) Collected: 07/10/24 0533    Specimen: Blood Updated: 07/10/24 0648     WBC 7.53 10*3/mm3      RBC 3.81 10*6/mm3      Hemoglobin 9.9 g/dL      Hematocrit 30.3 %      MCV 79.5 fL      MCH 26.0 pg      MCHC 32.7 g/dL      RDW 14.4 %      RDW-SD 41.7 fl      MPV 11.3 fL      Platelets 199 10*3/mm3      Neutrophil % 78.5 %      Lymphocyte % 12.9 %      Monocyte % 8.2 %      Eosinophil % 0.1 %      Basophil % 0.0 %      Immature Grans % 0.3 %      Neutrophils, Absolute 5.91 10*3/mm3      Lymphocytes, Absolute 0.97 10*3/mm3      Monocytes, Absolute 0.62 10*3/mm3      Eosinophils, Absolute 0.01 10*3/mm3      Basophils, Absolute 0.00 10*3/mm3      Immature Grans, Absolute 0.02 10*3/mm3      nRBC 0.0 /100 WBC     Scan Slide [[id number]] Collected: 07/10/24 0533    Specimen: Blood Updated: 07/10/24 0648     RBC Morphology Normal     WBC Morphology Normal     Large Platelets Slight/1+    Comprehensive Metabolic Panel [[id number]]  (Abnormal) Collected: 07/10/24 0533    Specimen: Blood  Updated: 07/10/24 0620     Glucose 165 mg/dL      BUN 7 mg/dL      Creatinine 0.56 mg/dL      Sodium 137 mmol/L      Potassium 4.0 mmol/L      Chloride 106 mmol/L      CO2 22.0 mmol/L      Calcium 8.3 mg/dL      Total Protein 6.3 g/dL      Albumin 3.5 g/dL      ALT (SGPT) 30 U/L      AST (SGOT) 18 U/L      Alkaline Phosphatase 83 U/L      Total Bilirubin 0.3 mg/dL      Globulin 2.8 gm/dL      Comment: Calculated Result        A/G Ratio 1.3 g/dL      BUN/Creatinine Ratio 12.5     Anion Gap 9.0 mmol/L      eGFR 117.8 mL/min/1.73     Narrative:      GFR Normal >60  Chronic Kidney Disease <60  Kidney Failure <15      Iron [[id number]]  (Abnormal) Collected: 07/10/24 0533    Specimen: Blood Updated: 07/10/24 0620     Iron 27 mcg/dL     POC Glucose Once [[id number]]  (Abnormal) Collected: 07/09/24 2038    Specimen: Blood Updated: 07/09/24 2040     Glucose 183 mg/dL     POC Glucose Once [[id number]]  (Abnormal) Collected: 07/09/24 1634    Specimen: Blood Updated: 07/09/24 1636     Glucose 201 mg/dL             Imaging:  Imaging Results (Last 24 Hours)       Procedure Component Value Units Date/Time    FL Upper GI Water Soluble [[id number]] Resulted: 07/10/24 0828     Updated: 07/10/24 0844              Assessment & Plan     POD # 1 s/p RSG.    Doing fairly well.  UGI normal post-op, images and report reviewed.  IV iron given for low Fe.      Lovenox was held this morning for >4 gram drop in Hgb.  VS have been good, save transient tachycardia to 110s overnight.  She is not pale, and is tolerating ambulation without severe dizziness or syncope.   Serial Hgb, hold Lovenox, T&S performed today.  Transfuse for symptomatic anemia or Hgb <8.  If Hgb continues to drop significantly, will need CT to evaluate. Had brief discussion of the possible outcomes of post-operative acute blood loss anemia, being possible need for transfusion, possible evacuation of hematoma if one is found on CT and she is severely symptomatic with unrelenting  abdominal pain.     Elevate left hand for swelling, warm compress as needed.    Pt reeducated on pulmonary toilet.     Continue OOB/ PT/ mechanical DVT prophx.          Kelsey Cerna MD  07/10/24  15:28 EDT

## 2024-07-11 LAB
ALBUMIN SERPL-MCNC: 3.4 G/DL (ref 3.5–5.2)
ALBUMIN/GLOB SERPL: 1.5 G/DL
ALP SERPL-CCNC: 80 U/L (ref 39–117)
ALT SERPL W P-5'-P-CCNC: 18 U/L (ref 1–33)
ANION GAP SERPL CALCULATED.3IONS-SCNC: 8 MMOL/L (ref 5–15)
AST SERPL-CCNC: 12 U/L (ref 1–32)
BASOPHILS # BLD AUTO: 0.02 10*3/MM3 (ref 0–0.2)
BASOPHILS NFR BLD AUTO: 0.3 % (ref 0–1.5)
BILIRUB SERPL-MCNC: 0.2 MG/DL (ref 0–1.2)
BUN SERPL-MCNC: 7 MG/DL (ref 6–20)
BUN/CREAT SERPL: 16.3 (ref 7–25)
CALCIUM SPEC-SCNC: 7.8 MG/DL (ref 8.6–10.5)
CHLORIDE SERPL-SCNC: 108 MMOL/L (ref 98–107)
CO2 SERPL-SCNC: 23 MMOL/L (ref 22–29)
CREAT SERPL-MCNC: 0.43 MG/DL (ref 0.57–1)
CYTO UR: NORMAL
DEPRECATED RDW RBC AUTO: 43.8 FL (ref 37–54)
EGFRCR SERPLBLD CKD-EPI 2021: 125.5 ML/MIN/1.73
EOSINOPHIL # BLD AUTO: 0.03 10*3/MM3 (ref 0–0.4)
EOSINOPHIL NFR BLD AUTO: 0.4 % (ref 0.3–6.2)
ERYTHROCYTE [DISTWIDTH] IN BLOOD BY AUTOMATED COUNT: 14.8 % (ref 12.3–15.4)
GLOBULIN UR ELPH-MCNC: 2.3 GM/DL
GLUCOSE BLDC GLUCOMTR-MCNC: 113 MG/DL (ref 70–130)
GLUCOSE BLDC GLUCOMTR-MCNC: 120 MG/DL (ref 70–130)
GLUCOSE BLDC GLUCOMTR-MCNC: 131 MG/DL (ref 70–130)
GLUCOSE BLDC GLUCOMTR-MCNC: 133 MG/DL (ref 70–130)
GLUCOSE BLDC GLUCOMTR-MCNC: 162 MG/DL (ref 70–130)
GLUCOSE SERPL-MCNC: 128 MG/DL (ref 65–99)
HCT VFR BLD AUTO: 26.5 % (ref 34–46.6)
HGB BLD-MCNC: 8.5 G/DL (ref 12–15.9)
HGB BLD-MCNC: 9.1 G/DL (ref 12–15.9)
IMM GRANULOCYTES # BLD AUTO: 0.07 10*3/MM3 (ref 0–0.05)
IMM GRANULOCYTES NFR BLD AUTO: 0.9 % (ref 0–0.5)
LAB AP CASE REPORT: NORMAL
LAB AP CLINICAL INFORMATION: NORMAL
LYMPHOCYTES # BLD AUTO: 2.02 10*3/MM3 (ref 0.7–3.1)
LYMPHOCYTES NFR BLD AUTO: 26.3 % (ref 19.6–45.3)
MCH RBC QN AUTO: 25.7 PG (ref 26.6–33)
MCHC RBC AUTO-ENTMCNC: 32.1 G/DL (ref 31.5–35.7)
MCV RBC AUTO: 80.1 FL (ref 79–97)
MONOCYTES # BLD AUTO: 0.69 10*3/MM3 (ref 0.1–0.9)
MONOCYTES NFR BLD AUTO: 9 % (ref 5–12)
NEUTROPHILS NFR BLD AUTO: 4.86 10*3/MM3 (ref 1.7–7)
NEUTROPHILS NFR BLD AUTO: 63.1 % (ref 42.7–76)
NRBC BLD AUTO-RTO: 0 /100 WBC (ref 0–0.2)
PATH REPORT.FINAL DX SPEC: NORMAL
PATH REPORT.GROSS SPEC: NORMAL
PLATELET # BLD AUTO: 204 10*3/MM3 (ref 140–450)
PMV BLD AUTO: 10.2 FL (ref 6–12)
POTASSIUM SERPL-SCNC: 4.1 MMOL/L (ref 3.5–5.2)
PROT SERPL-MCNC: 5.7 G/DL (ref 6–8.5)
RBC # BLD AUTO: 3.31 10*6/MM3 (ref 3.77–5.28)
SODIUM SERPL-SCNC: 139 MMOL/L (ref 136–145)
WBC NRBC COR # BLD AUTO: 7.69 10*3/MM3 (ref 3.4–10.8)

## 2024-07-11 PROCEDURE — 25010000002 POTASSIUM CHLORIDE PER 2 MEQ: Performed by: SURGERY

## 2024-07-11 PROCEDURE — 94799 UNLISTED PULMONARY SVC/PX: CPT

## 2024-07-11 PROCEDURE — 25010000002 HYDROMORPHONE PER 4 MG: Performed by: SURGERY

## 2024-07-11 PROCEDURE — 80053 COMPREHEN METABOLIC PANEL: CPT | Performed by: SURGERY

## 2024-07-11 PROCEDURE — 82948 REAGENT STRIP/BLOOD GLUCOSE: CPT

## 2024-07-11 PROCEDURE — 85025 COMPLETE CBC W/AUTO DIFF WBC: CPT | Performed by: SURGERY

## 2024-07-11 PROCEDURE — 94761 N-INVAS EAR/PLS OXIMETRY MLT: CPT

## 2024-07-11 PROCEDURE — 99024 POSTOP FOLLOW-UP VISIT: CPT | Performed by: SURGERY

## 2024-07-11 PROCEDURE — 94664 DEMO&/EVAL PT USE INHALER: CPT

## 2024-07-11 PROCEDURE — 85018 HEMOGLOBIN: CPT | Performed by: SURGERY

## 2024-07-11 RX ADMIN — HYDROMORPHONE HYDROCHLORIDE 0.5 MG: 1 INJECTION, SOLUTION INTRAMUSCULAR; INTRAVENOUS; SUBCUTANEOUS at 04:53

## 2024-07-11 RX ADMIN — BUDESONIDE AND FORMOTEROL FUMARATE DIHYDRATE 2 PUFF: 160; 4.5 AEROSOL RESPIRATORY (INHALATION) at 20:53

## 2024-07-11 RX ADMIN — ACETAMINOPHEN 1000 MG: 500 TABLET ORAL at 05:54

## 2024-07-11 RX ADMIN — GABAPENTIN 100 MG: 100 CAPSULE ORAL at 08:08

## 2024-07-11 RX ADMIN — OXYCODONE HYDROCHLORIDE 5 MG: 5 TABLET ORAL at 00:39

## 2024-07-11 RX ADMIN — LEVETIRACETAM 750 MG: 500 TABLET, FILM COATED ORAL at 08:08

## 2024-07-11 RX ADMIN — PRAZOSIN HYDROCHLORIDE 5 MG: 1 CAPSULE ORAL at 21:03

## 2024-07-11 RX ADMIN — PANTOPRAZOLE SODIUM 40 MG: 40 TABLET, DELAYED RELEASE ORAL at 08:08

## 2024-07-11 RX ADMIN — OXYCODONE HYDROCHLORIDE 5 MG: 5 TABLET ORAL at 14:01

## 2024-07-11 RX ADMIN — METHOCARBAMOL 500 MG: 500 TABLET ORAL at 20:15

## 2024-07-11 RX ADMIN — OXYCODONE HYDROCHLORIDE 5 MG: 5 TABLET ORAL at 08:08

## 2024-07-11 RX ADMIN — BUDESONIDE AND FORMOTEROL FUMARATE DIHYDRATE 2 PUFF: 160; 4.5 AEROSOL RESPIRATORY (INHALATION) at 09:00

## 2024-07-11 RX ADMIN — METHOCARBAMOL 500 MG: 500 TABLET ORAL at 17:33

## 2024-07-11 RX ADMIN — CARIPRAZINE 3 MG: 3 CAPSULE, GELATIN COATED ORAL at 08:08

## 2024-07-11 RX ADMIN — POTASSIUM CHLORIDE AND SODIUM CHLORIDE 100 ML/HR: 450; 150 INJECTION, SOLUTION INTRAVENOUS at 21:03

## 2024-07-11 RX ADMIN — METHOCARBAMOL 500 MG: 500 TABLET ORAL at 08:08

## 2024-07-11 RX ADMIN — OXYCODONE HYDROCHLORIDE 5 MG: 5 TABLET ORAL at 20:15

## 2024-07-11 RX ADMIN — Medication 10 ML: at 20:15

## 2024-07-11 RX ADMIN — LEVETIRACETAM 750 MG: 500 TABLET, FILM COATED ORAL at 20:15

## 2024-07-11 NOTE — PLAN OF CARE
Goal Outcome Evaluation:         Patient sleeping this whole shift, did get out of bed a few times to the bathroom, [place] in [place] parks once, a little unsteady gait and not keeping her eyes open when she walked. IS encouraged while awake, VSS, on RA, oriented x4, HGB down to 8.5 at 12am,  no obvious signs of bleeding. Incisions all clean,dry and intact. Pain rate 5-8 this shift medicated per emar.  poor oral intake

## 2024-07-11 NOTE — PROGRESS NOTES
"Cc: POD#2 RSG/HHR  \"so-so\"    She is sitting partially up in her hospital bed that is padded.  Seems groggy with unusual affect and poor historian.  She says she has pain \"that comes and goes\".  Nausea \"that comes and goes\", has had some of her protein shake.  Passed flatus and had a bowel movement \"finally\".  Denies melena.  Discussed that she was combative in recovery room and that her H&H has dropped from baseline.  When asked if she gets orthostatic symptoms while walking she says \"sometimes\".  Incentive spirometer less than 1000, cannot seem to use appropriate technique even with instruction    No fever pulse 88 respirations 20 blood pressure 120/69.  Room air sat 93%. UO 2250 - 750.  She is no apparent distress.  Not particularly pale.  Abdomen soft, nontender/appropriate, nondistended, bowel sounds normal active.  Wounds look okay.  No abdominal wall ecchymosis.    CMP normal except for glucose of 128 creatinine 0.43 chloride 108 calcium 7.8 total protein 5.7 abdomen 3.4.  White count normal at 7.7 with normal differential.  H&H:  7/10 1107 9.3           1545 9.3           1749  9.0           2350  8.5  7/11  0616 8.5    Formal upper GI report unremarkable.    Impression: Postop day #2 robotic sleeve gastrectomy.  H&H has stabilized without evidence of ongoing bleeding.  No significant increased pain and no ecchymosis, will hold off on CT for now.  Disabled with history of seizures, chronic pain, PTSD, cranial neurotransmitter with unusual affect, poor ability to express her symptoms, and inability to use her spirometer appropriately.  Low total protein and albumin.    Plan: Continue out of bed, pulmonary toilet, VTE prophylaxis with SCDs and ambulation, stage I diet.  Check prealbumin level in the morning.  Continue serial H&H.  If stable and tolerating her diet better hopefully home tomorrow.  "

## 2024-07-11 NOTE — PAYOR COMM NOTE
"Debora Greenwood RN  Utilization Management  P:[phone]  F:[phone]    [facility]# [id number]   Merle Muir (43 y.o. Female)       Date of Birth   1980    Social Security Number       Address   [address] [place] [address]    Home Phone   [phone]    MRN   [id number]       Jain   None    Marital Status   Single                            Admission Date   7/9/24    Admission Type   Elective    Admitting Provider   Kelsey Cerna MD    Attending Provider   Kelsey Cerna MD    Department, Room/Bed   [address], S207/1       Discharge Date       Discharge Disposition       Discharge Destination                                 Attending Provider: Kelsey Cerna MD    Allergies: Minocycline, Rifampin, Sudafed [Pseudoephedrine Hcl], Asa [Aspirin], Lactose Intolerance (Gi)    Isolation: None   Infection: None   Code Status: CPR    Ht: 157.5 cm (62\")   Wt: 117 kg (257 lb 11.2 oz)    Admission Cmt: None   Principal Problem: Obesity, Class III, BMI 40-49.9 (morbid obesity) [E66.01]                   Active Insurance as of 7/9/2024       Primary Coverage       Payor Plan Insurance Group Employer/Plan Group    [place] [id number]       Payor Plan Address Payor Plan Phone Number Payor Plan Fax Number Effective Dates    [employer] [address] [address] [phone]  4/1/2020 - None Entered    [place] [address]         Subscriber Name Subscriber Birth Date Member ID       MERLE MUIR 1980 [id number]                     Emergency Contacts        (Rel.) Home Phone Work Phone Mobile Phone    ROWENA CURRY (Significant Other) [phone] -- [phone]                 History & Physical        Kelsey Cerna MD at 07/09/24 0657          H&P reviewed.  The patient was examined and there are no changes to the H&P  Electronically signed by Kelsey Cerna MD at 07/09/24 0657   " "Source Note: H&P (View-Only)          [facility] BARIATRIC SURGERY  [address]  [address]  [facility] [address] [phone]      Patient  Name:  Merle Muir  :  1980      Date of Visit: 2024      Chief Complaint:  weight gain; unable to maintain weight loss    History of Present Illness:  Merle Muir is a 43 y.o. female who presents today for evaluation, education and consultation regarding weight loss surgery.     Patient has been overweight for many years, with numerous failed dietary/weight loss attempts.  She now has obesity related comorbidities of asthma, back pain, depression, GERD, BALAJI, DM2, urinary incontinence and as such has decided to pursue weight loss surgery.    From intake:  \"GI: GERD treated with daily Prilosec. EGD in . Hx of h pylori. S/p lap merry in  for gallstones. Reports history of chronic nausea.     History of epilepsy on Keppra and s/p neurostimulator. Follows with Dr. Deleon.     Type 2 diabetic-dx in . A1c is 7. Currently on Ozempic. No hx of insulin use.     Other PMHx significant for BALAJI on CPAP, asthma, anxiety/depression, PTSD, hx of suicide attempt, chronic back pain.     \"    2024 Update:    Since LOV, started on 10 U QHS Lantus.  Also on Ozempic and glipizide.  Reports random BG running 300s.  Has improved BG.  Last A1C was improving from 9.    Epilepsy.  Last seizure was a minor one (absence) yesterday.  Has also had grand mal in the past.      The patient lives in [place], [place], and she babysits intermittently.  She is disabled for seizures, chronic pain, PTSD.  Cranial neurotransmitter (implanted in left chest).  Takes Keppra, and in hospital has required benzos to abort seizure.  Last hospitalized for grand mal seizure 1 year go.  Does not drive d/t seizures.      No personal or family hx of VTE or clotting d/o.  No liver, lung, heart, or renal disease        Review of data:    XAVIER: " pregabalin monthly  CBC: normal  CMP: normal  HP negative after treatment.  Took several rounds to clear HP.       EGD:   2022 Dr. Ott  Portal hypertensive gastropathy, LA grade A esophagitis, diverticulum in middle 1/3 of esophagus  No mention of HH.    Path  DIAGNOSIS:  A.   DUODENUM, BIOPSY, SECOND PORTION:  Duodenal type mucosa with no significant histopathologic abnormalities  B.   ANTRUM, BIOPSY:  H. pylori gastritis  Negative for intestinal metaplasia or dysplasia  C.   GASTRIC BIOPSY, BODY:  H. pylori gastritis  Negative for intestinal metaplasia or dysplasia  D.   ESOPHAGUS, BIOPSY, DISTAL:  Squamous mucosa with features suggestive of reflux esophagitis (no  eosinophils seen)  Glandular mucosa with mild chronic inflammation  Negative for intestinal metaplasia or dysplasia  Negative for specific microorganisms      GES: normal    Cardiac clearance: cleared without further testing     Neurology clearance: cleared for bariatric surgery re: epilepsy    Pulm clearance: low risk        Last tobacco: never  Last NSAIDs: Mobic yesterday, toradol shot 3 weeks ago.  Last ASA: n/a  Last steroids: n/a  Last hormones: remote  Ozempic: 10 days ago.  Did not take last week's dose d/t nausea.         Past Medical History:   Diagnosis Date    Anxiety and depression     Asthma     Back pain     Naproxen and Flexeril; hx of steroid injections    Chest pain     Cholelithiasis     Gallbladder been removed due to infection    Epilepsy     Keppra; has neurotransmiter in place. Dr. Deleon    Fibromyalgia     GERD (gastroesophageal reflux disease)     Omeprazole; EGD in 2022; hx of h pylori    History of Helicobacter pylori infection     s/p treatment    History of renal insufficiency     History of suicide attempt     at age 18 and 20    HLD (hyperlipidemia)     Lactose intolerance     I think so not sure    Nausea     chronic    BALAJI on CPAP     PTSD (post-traumatic stress disorder)     Right bundle branch block     Type 2  diabetes mellitus     dx in ; no insulin; last a1c 7    Urinary incontinence     s/p bladder stimulator     Past Surgical History:   Procedure Laterality Date    BLADDER SURGERY      stimulator     SECTION      lower transverse    CRANIAL NEUROSTIMULATOR INSERTION/REPLACEMENT      battery replaced in     DIAGNOSTIC LAPAROSCOPY      ovarian cysts    ENDOSCOPY AND COLONOSCOPY      LAPAROSCOPIC CHOLECYSTECTOMY      gallstones    NASAL SINUS SURGERY      TEETH EXTRACTION         Allergies   Allergen Reactions    Minocycline Other (See Comments)     Renal failure    Rifampin Other (See Comments)     Renal failure    Sudafed [Pseudoephedrine Hcl] Other (See Comments)     Pt states it shuts her body down    Asa [Aspirin] Other (See Comments)     Unknown    Lactose Intolerance (Gi) GI Intolerance       Current Outpatient Medications:     Acetaminophen 500 MG capsule, Take  by mouth Every 4 (Four) Hours As Needed., Disp: , Rfl:     albuterol sulfate  (90 Base) MCG/ACT inhaler, albuterol sulfate HFA 90 mcg/actuation aerosol inhaler, Disp: , Rfl:     Cholecalciferol (VITAMIN D3) 50 MCG (2000 UT) capsule, Vitamin D3 50 mcg (2,000 unit) capsule  2 capsules daily OTC, Disp: , Rfl:     colestipol (COLESTID) 1 g tablet, Take 1 tablet by mouth 2 (Two) Times a Day., Disp: 60 tablet, Rfl: 2    cyclobenzaprine (FLEXERIL) 10 MG tablet, Take 1 tablet by mouth 3 (Three) Times a Day As Needed., Disp: , Rfl:     fluticasone (FLONASE) 50 MCG/ACT nasal spray, , Disp: , Rfl:     glipizide (GLUCOTROL XL) 5 MG ER tablet, Take 1 tablet by mouth 2 (Two) Times a Day., Disp: , Rfl:     hydrOXYzine pamoate (VISTARIL) 25 MG capsule, Take 1 capsule by mouth 3 (Three) Times a Day As Needed., Disp: , Rfl:     insulin glargine (LANTUS, SEMGLEE) 100 UNIT/ML injection, Inject 10 Units under the skin into the appropriate area as directed Daily., Disp: , Rfl:     levETIRAcetam (KEPPRA) 750 MG tablet, Take 1 tablet by  mouth 2 (Two) Times a Day., Disp: , Rfl:     Melatonin 10 MG tablet, Every Night., Disp: , Rfl:     methocarbamol (ROBAXIN) 500 MG tablet, Take 1 tablet by mouth 3 (Three) Times a Day., Disp: , Rfl:     mometasone-formoterol (DULERA 100) 100-5 MCG/ACT inhaler, Inhale 2 puffs 2 (Two) Times a Day., Disp: , Rfl:     omeprazole (priLOSEC) 20 MG capsule, Take 1 capsule by mouth Daily., Disp: , Rfl:     ondansetron ODT (ZOFRAN-ODT) 4 MG disintegrating tablet, Place 1 tablet 3 times a day by translingual route as needed for 2 days., Disp: , Rfl:     OneTouch Ultra test strip, , Disp: , Rfl:     prazosin (MINIPRESS) 5 MG capsule, Take 1 capsule by mouth Every Night., Disp: , Rfl:     pregabalin (LYRICA) 50 MG capsule, pregabalin 50 mg capsule, Disp: , Rfl:     Semaglutide, 1 MG/DOSE, (Ozempic, 1 MG/DOSE,) 2 MG/1.5ML solution pen-injector, 1 mg 1 (One) Time Per Week., Disp: , Rfl:     sertraline (ZOLOFT) 50 MG tablet, Take 1 tablet by mouth Daily., Disp: , Rfl:     Vraylar 3 MG capsule capsule, Take 1 capsule every day by oral route for 90 days., Disp: , Rfl:     amoxicillin (AMOXIL) 500 MG capsule, Take 2 capsules by mouth 2 (Two) Times a Day. (Patient not taking: Reported on 6/24/2024), Disp: 56 capsule, Rfl: 0    meloxicam (MOBIC) 15 MG tablet, Take 1 tablet by mouth Daily. (Patient not taking: Reported on 6/24/2024), Disp: , Rfl:     naproxen (NAPROSYN) 500 MG tablet, Take 1 tablet by mouth 2 (Two) Times a Day With Meals. (Patient not taking: Reported on 6/24/2024), Disp: , Rfl:     Social History     Socioeconomic History    Marital status: Single   Tobacco Use    Smoking status: Never     Passive exposure: Never    Smokeless tobacco: Never   Vaping Use    Vaping status: Never Used   Substance and Sexual Activity    Alcohol use: Yes     Comment: once a month    Drug use: Never    Sexual activity: Yes     Partners: Male     Birth control/protection: None     Social History     Social History Narrative    Lives in  [place], [place]  with 1 child and is currently disabled.         Family History   Adopted: Yes   Family history unknown: Yes       Review of Systems    Physical Exam:  Vital Signs:  Weight: 113 kg (249 lb)   Body mass index is 45.54 kg/m².  Temp: 97.7 °F (36.5 °C)   Heart Rate: 89   BP: 136/86     Physical Exam  Vitals reviewed.   Constitutional:       Appearance: She is well-developed.   HENT:      Head: Normocephalic and atraumatic.      Comments: edentulous     Nose: Nose normal.   Eyes:      Conjunctiva/sclera: Conjunctivae normal.      Pupils: Pupils are equal, round, and reactive to light.   Neck:      Thyroid: No thyromegaly.      Vascular: No carotid bruit.      Trachea: No tracheal deviation.   Cardiovascular:      Rate and Rhythm: Normal rate and regular rhythm.      Heart sounds: Normal heart sounds.   Pulmonary:      Effort: Pulmonary effort is normal. No respiratory distress.      Breath sounds: Normal breath sounds.   Abdominal:      General: There is no distension.      Palpations: Abdomen is soft.      Tenderness: There is no abdominal tenderness.      Comments: Lap scars, low transverse C section scar   Musculoskeletal:         General: No deformity. Normal range of motion.      Cervical back: Normal range of motion and neck supple.   Skin:     General: Skin is warm and dry.      Findings: No rash.   Neurological:      Mental Status: She is alert and oriented to person, place, and time.      Cranial Nerves: No cranial nerve deficit.      Coordination: Coordination normal.   Psychiatric:         Behavior: Behavior normal.         Thought Content: Thought content normal.         Judgment: Judgment normal.       Problem List Items Addressed This Visit       Type 2 diabetes mellitus    Overview     dx in 2022; no insulin; last a1c 7         Relevant Medications    glipizide (GLUCOTROL XL) 5 MG ER tablet    insulin glargine (LANTUS, SEMGLEE) 100 UNIT/ML injection    BALAJI on CPAP    Mild  intermittent asthma without complication    GERD (gastroesophageal reflux disease)    Overview     Omeprazole; EGD in 2022; hx of h pylori         Relevant Medications    omeprazole (priLOSEC) 20 MG capsule    Back pain    Overview     Naproxen and Flexeril; hx of steroid injections         Anxiety and depression     Other Visit Diagnoses       Obesity, Class III, BMI 40-49.9 (morbid obesity)    -  Primary            Assessment:    Merle Muir is a 43 y.o. year old female with medically complicated obesity.    Weight loss surgery is deemed medically necessary given the following obesity related comorbidities including asthma, back pain, depression, GERD, BALAJI, DM2, urinary incontinence with current Weight: 113 kg (249 lb) and Body mass index is 45.54 kg/m²..    Patient is aware that surgery is a tool, and that weight loss is not guaranteed but only seen in the context of appropriate use, follow up and exercise.    The patient was present for an approximately a 2.5 hour discussion of the purpose of weight loss surgery, how WLS is a tool to assist in achieving weight loss goals, the most common complications and how best to avoid them, and the strategies for short and long term weight loss.  Ample opportunity to discuss questions was available both in group and during the time of individual examination.    I reviewed all available preop labs, Xrays, tests, clearances, etc and signed off on these in the record.  All of this in addition to the patient's unique history and exam has been taken into consideration in determining their appropriate candidacy for weight loss surgery.    Complications  of laparoscopic/possible robotic gastric sleeve were discussed. The patient is well aware of the potential complications of surgery that include but not limited to bleeding, infections, deep venous thrombosis, pulmonary embolism, pulmonary complications such as pneumonia, cardiac events, hernias, small bowel  "obstruction, damage to the spleen or other organs, bowel injury, disfiguring scars, failure to lose weight, need for additional surgery, conversion to an open procedure, and death. Patient is also aware of complications which apply in this particular procedure that can include but are not limited to a \"leak\" at the staple line which in some instances may require conversion to gastric bypass.    The patient is aware if a hiatal hernia is encountered, it likely will be repaired.  R/B/A Rx to hiatal hernia repair were discussed as outlined in our long consent form.  Briefly risks in addition to those for LSG include recurrent hernia, GUY, dysphagia, esophageal injury, pneumothorax, injury to the vagus nerves, injury to the thoracic duct, aorta or vena cava.    Greater than 3 minutes was spent with the patient discussing avoiding all tobacco products and second hand smoke at least 2 weeks pre-operatively and 6 weeks post-operatively to minimize the risk of sleeve leak.  This included discussing the importance of avoiding even secondhand smoke as the risk of leak is increased.  Examples discussed:  I made it very clear that the patient understands they should avoid even riding in a car where someone has previously smoked in the last 2 weeks, living in a house where someone smokes (even if it's in a separate room/patio/attached garage, etc.) we discussed that they should not have a conversation with a group of people who are smoking even if it's outside.  They can be around wood burning fires and barbecue.  I told them I do not know if marijuana has a same effects but my overall recommendation is to avoid it for 2 weeks prior in 6 weeks after surgery.  They also are aware that nicotine may also increase the risk of leak and I strongly encouraged him to avoid that as well for 2 weeks prior in 6 weeks after surgery.    Discussed the risks, benefits and alternative therapies at great length as outlined in our extensive " consent forms, consent videos, and educational teaching process under the direction of the center's .    A copy of the patient's signed informed consent is on file.    Plan:  Laparoscopic , possible robotic assisted sleeve gastrectomy  at [facility]      R/B/A Rx discussed to postop anticoagulation including but not limited to bleeding, drug reaction, venothromboembolic events, etc. and she declined.    MDM high:  Elective procedure with the following risk factors: morbid obesity, DM2, chronic pain, PTSD, reported panic disorder in hospital, RBBB, asthma  4+ chronic medical problems reviewed.    I spent 60 minutes caring for Merle on this date of service. This time includes time spent by me in the following activities: preparing for the visit, reviewing tests, performing a medically appropriate examination and/or evaluation, counseling and educating the patient/family/caregiver, referring and communicating with other health care professionals, documenting information in the medical record, care coordination, ordering medications, ordering test(s), ordering procedure(s), obtaining a separately obtained history, and reviewing a separately obtained history.       Thank you Kush Marion MD for allowing me to share in the care of our mutual patient.             Kelsey Cerna MD                                           Answers submitted by the patient for this visit:  Other (Submitted on 6/22/2024)  Please describe your symptoms.: Pretty sure this cisit was for my gastric bypass  Have you had these symptoms before?: Yes  How long have you been having these symptoms?: Greater than 2 weeks  Primary Reason for Visit (Submitted on 6/22/2024)  What is the primary reason for your visit?: Other      Electronically signed by Kelsey Cerna MD at 06/24/24 1336                 Kelsey Cerna MD at 06/24/24 1300          [facility] BARIATRIC SURGERY  [facility] [address]  "[address] [address]  [phone]      Patient  Name:  Merle Muir  :  1980      Date of Visit: 2024      Chief Complaint:  weight gain; unable to maintain weight loss    History of Present Illness:  Merle Muir is a 43 y.o. female who presents today for evaluation, education and consultation regarding weight loss surgery.     Patient has been overweight for many years, with numerous failed dietary/weight loss attempts.  She now has obesity related comorbidities of asthma, back pain, depression, GERD, BALAJI, DM2, urinary incontinence and as such has decided to pursue weight loss surgery.    From intake:  \"GI: GERD treated with daily Prilosec. EGD in . Hx of h pylori. S/p lap merry in  for gallstones. Reports history of chronic nausea.     History of epilepsy on Keppra and s/p neurostimulator. Follows with Dr. Deleon.     Type 2 diabetic-dx in . A1c is 7. Currently on Ozempic. No hx of insulin use.     Other PMHx significant for BALAJI on CPAP, asthma, anxiety/depression, PTSD, hx of suicide attempt, chronic back pain.     \"    2024 Update:    Since LOV, started on 10 U QHS Lantus.  Also on Ozempic and glipizide.  Reports random BG running 300s.  Has improved BG.  Last A1C was improving from 9.    Epilepsy.  Last seizure was a minor one (absence) yesterday.  Has also had grand mal in the past.      The patient lives in [place], [place], and she babysits intermittently.  She is disabled for seizures, chronic pain, PTSD.  Cranial neurotransmitter (implanted in left chest).  Takes Keppra, and in hospital has required benzos to abort seizure.  Last hospitalized for grand mal seizure 1 year go.  Does not drive d/t seizures.      No personal or family hx of VTE or clotting d/o.  No liver, lung, heart, or renal disease        Review of data:    XAVIER: pregabalin monthly  CBC: normal  CMP: normal  HP negative after treatment.  Took several rounds to clear " HP.       EGD:   2022 Dr. Ott  Portal hypertensive gastropathy, LA grade A esophagitis, diverticulum in middle 1/3 of esophagus  No mention of HH.    Path  DIAGNOSIS:  A.   DUODENUM, BIOPSY, SECOND PORTION:  Duodenal type mucosa with no significant histopathologic abnormalities  B.   ANTRUM, BIOPSY:  H. pylori gastritis  Negative for intestinal metaplasia or dysplasia  C.   GASTRIC BIOPSY, BODY:  H. pylori gastritis  Negative for intestinal metaplasia or dysplasia  D.   ESOPHAGUS, BIOPSY, DISTAL:  Squamous mucosa with features suggestive of reflux esophagitis (no  eosinophils seen)  Glandular mucosa with mild chronic inflammation  Negative for intestinal metaplasia or dysplasia  Negative for specific microorganisms      GES: normal    Cardiac clearance: cleared without further testing     Neurology clearance: cleared for bariatric surgery re: epilepsy    Pulm clearance: low risk        Last tobacco: never  Last NSAIDs: Mobic yesterday, toradol shot 3 weeks ago.  Last ASA: n/a  Last steroids: n/a  Last hormones: remote  Ozempic: 10 days ago.  Did not take last week's dose d/t nausea.         Past Medical History:   Diagnosis Date    Anxiety and depression     Asthma     Back pain     Naproxen and Flexeril; hx of steroid injections    Chest pain     Cholelithiasis     Gallbladder been removed due to infection    Epilepsy     Keppra; has neurotransmiter in place. Dr. Deleon    Fibromyalgia     GERD (gastroesophageal reflux disease)     Omeprazole; EGD in 2022; hx of h pylori    History of Helicobacter pylori infection     s/p treatment    History of renal insufficiency     History of suicide attempt     at age 18 and 20    HLD (hyperlipidemia)     Lactose intolerance     I think so not sure    Nausea     chronic    BALAJI on CPAP     PTSD (post-traumatic stress disorder)     Right bundle branch block     Type 2 diabetes mellitus     dx in 2022; no insulin; last a1c 7    Urinary incontinence     s/p bladder  stimulator     Past Surgical History:   Procedure Laterality Date    BLADDER SURGERY      stimulator     SECTION      lower transverse    CRANIAL NEUROSTIMULATOR INSERTION/REPLACEMENT      battery replaced in     DIAGNOSTIC LAPAROSCOPY      ovarian cysts    ENDOSCOPY AND COLONOSCOPY      LAPAROSCOPIC CHOLECYSTECTOMY      gallstones    NASAL SINUS SURGERY      TEETH EXTRACTION         Allergies   Allergen Reactions    Minocycline Other (See Comments)     Renal failure    Rifampin Other (See Comments)     Renal failure    Sudafed [Pseudoephedrine Hcl] Other (See Comments)     Pt states it shuts her body down    Asa [Aspirin] Other (See Comments)     Unknown    Lactose Intolerance (Gi) GI Intolerance       Current Outpatient Medications:     Acetaminophen 500 MG capsule, Take  by mouth Every 4 (Four) Hours As Needed., Disp: , Rfl:     albuterol sulfate  (90 Base) MCG/ACT inhaler, albuterol sulfate HFA 90 mcg/actuation aerosol inhaler, Disp: , Rfl:     Cholecalciferol (VITAMIN D3) 50 MCG (2000 UT) capsule, Vitamin D3 50 mcg (2,000 unit) capsule  2 capsules daily OTC, Disp: , Rfl:     colestipol (COLESTID) 1 g tablet, Take 1 tablet by mouth 2 (Two) Times a Day., Disp: 60 tablet, Rfl: 2    cyclobenzaprine (FLEXERIL) 10 MG tablet, Take 1 tablet by mouth 3 (Three) Times a Day As Needed., Disp: , Rfl:     fluticasone (FLONASE) 50 MCG/ACT nasal spray, , Disp: , Rfl:     glipizide (GLUCOTROL XL) 5 MG ER tablet, Take 1 tablet by mouth 2 (Two) Times a Day., Disp: , Rfl:     hydrOXYzine pamoate (VISTARIL) 25 MG capsule, Take 1 capsule by mouth 3 (Three) Times a Day As Needed., Disp: , Rfl:     insulin glargine (LANTUS, SEMGLEE) 100 UNIT/ML injection, Inject 10 Units under the skin into the appropriate area as directed Daily., Disp: , Rfl:     levETIRAcetam (KEPPRA) 750 MG tablet, Take 1 tablet by mouth 2 (Two) Times a Day., Disp: , Rfl:     Melatonin 10 MG tablet, Every Night., Disp: , Rfl:      methocarbamol (ROBAXIN) 500 MG tablet, Take 1 tablet by mouth 3 (Three) Times a Day., Disp: , Rfl:     mometasone-formoterol (DULERA 100) 100-5 MCG/ACT inhaler, Inhale 2 puffs 2 (Two) Times a Day., Disp: , Rfl:     omeprazole (priLOSEC) 20 MG capsule, Take 1 capsule by mouth Daily., Disp: , Rfl:     ondansetron ODT (ZOFRAN-ODT) 4 MG disintegrating tablet, Place 1 tablet 3 times a day by translingual route as needed for 2 days., Disp: , Rfl:     OneTouch Ultra test strip, , Disp: , Rfl:     prazosin (MINIPRESS) 5 MG capsule, Take 1 capsule by mouth Every Night., Disp: , Rfl:     pregabalin (LYRICA) 50 MG capsule, pregabalin 50 mg capsule, Disp: , Rfl:     Semaglutide, 1 MG/DOSE, (Ozempic, 1 MG/DOSE,) 2 MG/1.5ML solution pen-injector, 1 mg 1 (One) Time Per Week., Disp: , Rfl:     sertraline (ZOLOFT) 50 MG tablet, Take 1 tablet by mouth Daily., Disp: , Rfl:     Vraylar 3 MG capsule capsule, Take 1 capsule every day by oral route for 90 days., Disp: , Rfl:     amoxicillin (AMOXIL) 500 MG capsule, Take 2 capsules by mouth 2 (Two) Times a Day. (Patient not taking: Reported on 6/24/2024), Disp: 56 capsule, Rfl: 0    meloxicam (MOBIC) 15 MG tablet, Take 1 tablet by mouth Daily. (Patient not taking: Reported on 6/24/2024), Disp: , Rfl:     naproxen (NAPROSYN) 500 MG tablet, Take 1 tablet by mouth 2 (Two) Times a Day With Meals. (Patient not taking: Reported on 6/24/2024), Disp: , Rfl:     Social History     Socioeconomic History    Marital status: Single   Tobacco Use    Smoking status: Never     Passive exposure: Never    Smokeless tobacco: Never   Vaping Use    Vaping status: Never Used   Substance and Sexual Activity    Alcohol use: Yes     Comment: once a month    Drug use: Never    Sexual activity: Yes     Partners: Male     Birth control/protection: None     Social History     Social History Narrative    Lives in [place], [place]  with 1 child and is currently disabled.         Family History   Adopted: Yes    Family history unknown: Yes       Review of Systems    Physical Exam:  Vital Signs:  Weight: 113 kg (249 lb)   Body mass index is 45.54 kg/m².  Temp: 97.7 °F (36.5 °C)   Heart Rate: 89   BP: 136/86     Physical Exam  Vitals reviewed.   Constitutional:       Appearance: She is well-developed.   HENT:      Head: Normocephalic and atraumatic.      Comments: edentulous     Nose: Nose normal.   Eyes:      Conjunctiva/sclera: Conjunctivae normal.      Pupils: Pupils are equal, round, and reactive to light.   Neck:      Thyroid: No thyromegaly.      Vascular: No carotid bruit.      Trachea: No tracheal deviation.   Cardiovascular:      Rate and Rhythm: Normal rate and regular rhythm.      Heart sounds: Normal heart sounds.   Pulmonary:      Effort: Pulmonary effort is normal. No respiratory distress.      Breath sounds: Normal breath sounds.   Abdominal:      General: There is no distension.      Palpations: Abdomen is soft.      Tenderness: There is no abdominal tenderness.      Comments: Lap scars, low transverse C section scar   Musculoskeletal:         General: No deformity. Normal range of motion.      Cervical back: Normal range of motion and neck supple.   Skin:     General: Skin is warm and dry.      Findings: No rash.   Neurological:      Mental Status: She is alert and oriented to person, place, and time.      Cranial Nerves: No cranial nerve deficit.      Coordination: Coordination normal.   Psychiatric:         Behavior: Behavior normal.         Thought Content: Thought content normal.         Judgment: Judgment normal.       Problem List Items Addressed This Visit       Type 2 diabetes mellitus    Overview     dx in 2022; no insulin; last a1c 7         Relevant Medications    glipizide (GLUCOTROL XL) 5 MG ER tablet    insulin glargine (LANTUS, SEMGLEE) 100 UNIT/ML injection    BALAJI on CPAP    Mild intermittent asthma without complication    GERD (gastroesophageal reflux disease)    Overview     Omeprazole;  EGD in 2022; hx of h pylori         Relevant Medications    omeprazole (priLOSEC) 20 MG capsule    Back pain    Overview     Naproxen and Flexeril; hx of steroid injections         Anxiety and depression     Other Visit Diagnoses       Obesity, Class III, BMI 40-49.9 (morbid obesity)    -  Primary            Assessment:    Merle Muir is a 43 y.o. year old female with medically complicated obesity.    Weight loss surgery is deemed medically necessary given the following obesity related comorbidities including asthma, back pain, depression, GERD, BALAJI, DM2, urinary incontinence with current Weight: 113 kg (249 lb) and Body mass index is 45.54 kg/m²..    Patient is aware that surgery is a tool, and that weight loss is not guaranteed but only seen in the context of appropriate use, follow up and exercise.    The patient was present for an approximately a 2.5 hour discussion of the purpose of weight loss surgery, how WLS is a tool to assist in achieving weight loss goals, the most common complications and how best to avoid them, and the strategies for short and long term weight loss.  Ample opportunity to discuss questions was available both in group and during the time of individual examination.    I reviewed all available preop labs, Xrays, tests, clearances, etc and signed off on these in the record.  All of this in addition to the patient's unique history and exam has been taken into consideration in determining their appropriate candidacy for weight loss surgery.    Complications  of laparoscopic/possible robotic gastric sleeve were discussed. The patient is well aware of the potential complications of surgery that include but not limited to bleeding, infections, deep venous thrombosis, pulmonary embolism, pulmonary complications such as pneumonia, cardiac events, hernias, small bowel obstruction, damage to the spleen or other organs, bowel injury, disfiguring scars, failure to lose weight, need for  "additional surgery, conversion to an open procedure, and death. Patient is also aware of complications which apply in this particular procedure that can include but are not limited to a \"leak\" at the staple line which in some instances may require conversion to gastric bypass.    The patient is aware if a hiatal hernia is encountered, it likely will be repaired.  R/B/A Rx to hiatal hernia repair were discussed as outlined in our long consent form.  Briefly risks in addition to those for LSG include recurrent hernia, GUY, dysphagia, esophageal injury, pneumothorax, injury to the vagus nerves, injury to the thoracic duct, aorta or vena cava.    Greater than 3 minutes was spent with the patient discussing avoiding all tobacco products and second hand smoke at least 2 weeks pre-operatively and 6 weeks post-operatively to minimize the risk of sleeve leak.  This included discussing the importance of avoiding even secondhand smoke as the risk of leak is increased.  Examples discussed:  I made it very clear that the patient understands they should avoid even riding in a car where someone has previously smoked in the last 2 weeks, living in a house where someone smokes (even if it's in a separate room/patio/attached garage, etc.) we discussed that they should not have a conversation with a group of people who are smoking even if it's outside.  They can be around wood burning fires and barbecue.  I told them I do not know if marijuana has a same effects but my overall recommendation is to avoid it for 2 weeks prior in 6 weeks after surgery.  They also are aware that nicotine may also increase the risk of leak and I strongly encouraged him to avoid that as well for 2 weeks prior in 6 weeks after surgery.    Discussed the risks, benefits and alternative therapies at great length as outlined in our extensive consent forms, consent videos, and educational teaching process under the direction of the center's program " coordinator.    A copy of the patient's signed informed consent is on file.    Plan:  Laparoscopic , possible robotic assisted sleeve gastrectomy  at [facility]      R/B/A Rx discussed to postop anticoagulation including but not limited to bleeding, drug reaction, venothromboembolic events, etc. and she declined.    MDM high:  Elective procedure with the following risk factors: morbid obesity, DM2, chronic pain, PTSD, reported panic disorder in hospital, RBBB, asthma  4+ chronic medical problems reviewed.    I spent 60 minutes caring for Merle on this date of service. This time includes time spent by me in the following activities: preparing for the visit, reviewing tests, performing a medically appropriate examination and/or evaluation, counseling and educating the patient/family/caregiver, referring and communicating with other health care professionals, documenting information in the medical record, care coordination, ordering medications, ordering test(s), ordering procedure(s), obtaining a separately obtained history, and reviewing a separately obtained history.       Thank you Kush Marion MD for allowing me to share in the care of our mutual patient.             Kelsey Cerna MD                                           Answers submitted by the patient for this visit:  Other (Submitted on 6/22/2024)  Please describe your symptoms.: Pretty sure this cisit was for my gastric bypass  Have you had these symptoms before?: Yes  How long have you been having these symptoms?: Greater than 2 weeks  Primary Reason for Visit (Submitted on 6/22/2024)  What is the primary reason for your visit?: Other      Electronically signed by Kelsey Cerna MD at 06/24/24 2924       Current Facility-Administered Medications   Medication Dose Route Frequency Provider Last Rate Last Admin    albuterol (PROVENTIL) nebulizer solution 0.083% 2.5 mg/3mL  2.5 mg Nebulization Q6H PRN Kelsey Cerna MD         ALPRAZolam (XANAX) tablet 0.25 mg  0.25 mg Oral BID PRN Kelsey Cerna MD        amisulpride (antiemetic) (BARHEMSYS) injection 10 mg  10 mg Intravenous Once PRN Kelsey Cerna MD        budesonide-formoterol (SYMBICORT) 160-4.5 MCG/ACT inhaler 2 puff  2 puff Inhalation BID - RT Kelsey Cerna MD   2 puff at 07/11/24 0900    Cariprazine HCl (VRAYLAR) capsule capsule 3 mg  3 mg Oral Daily Kelsey Cerna MD   3 mg at 07/11/24 0808    dextrose (D50W) (25 g/50 mL) IV injection 25 g  25 g Intravenous Q15 Min PRN Kelsey Cerna MD        dextrose (GLUTOSE) oral gel 15 g  15 g Oral Q15 Min PRN Kelsey Cerna MD        diphenhydrAMINE (BENADRYL) injection 25 mg  25 mg Intravenous Q4H PRN Kelsey Cerna MD        [Held by provider] Enoxaparin Sodium (LOVENOX) syringe 40 mg  40 mg Subcutaneous Q12H Kelsey Cerna MD   40 mg at 07/10/24 2052    glucagon (GLUCAGEN) injection 1 mg  1 mg Intramuscular Q15 Min PRN Kelsey Cerna MD        hydrALAZINE (APRESOLINE) injection 10 mg  10 mg Intravenous Q30 Min PRN Kelsey Cerna MD        HYDROmorphone (DILAUDID) injection 0.5 mg  0.5 mg Intravenous Q2H PRN Kelsey Cerna MD   0.5 mg at 07/11/24 0453    And    naloxone (NARCAN) injection 0.1 mg  0.1 mg Intravenous Q5 Min PRN Kelsey Cerna MD        HYDROmorphone (DILAUDID) tablet 2 mg  2 mg Oral Q4H PRN Kelsey Cerna MD   2 mg at 07/10/24 0428    Insulin Lispro (humaLOG) injection 3-14 Units  3-14 Units Subcutaneous 4x Daily AC & at Bedtime Kelsey Cerna MD   3 Units at 07/10/24 1115    labetalol (NORMODYNE,TRANDATE) injection 10 mg  10 mg Intravenous Q30 Min PRN Kelsey Cerna MD        levETIRAcetam (KEPPRA) tablet 750 mg  750 mg Oral BID Kelsey Cerna MD   750 mg at 07/11/24 0808    methocarbamol (ROBAXIN) tablet 500 mg  500 mg Oral TID Kelsey Cerna MD   500 mg at 07/11/24 0808     metoclopramide (REGLAN) injection 10 mg  10 mg Intravenous Q6H PRN Kelsey Cerna MD        metoclopramide (REGLAN) tablet 10 mg  10 mg Oral Q6H PRN Kelsey Cerna MD        ondansetron (ZOFRAN) injection 4 mg  4 mg Intravenous Q6H PRN Kelsey Cerna MD   4 mg at 07/10/24 0428    Followed by    [START ON 7/13/2024] ondansetron ODT (ZOFRAN-ODT) disintegrating tablet 4 mg  4 mg Oral Q6H PRN Kelsey Cerna MD        oxyCODONE (ROXICODONE) immediate release tablet 5 mg  5 mg Oral Q6H PRN Kelsey Cerna MD   5 mg at 07/11/24 0808    pantoprazole (PROTONIX) EC tablet 40 mg  40 mg Oral QAM Kelsey Cerna MD   40 mg at 07/11/24 0808    phenol (CHLORASEPTIC) 1.4 % liquid 2 spray  2 spray Mouth/Throat Q2H PRN Kelsey Cerna MD        prazosin (MINIPRESS) capsule 5 mg  5 mg Oral Nightly Kelsey Cerna MD   5 mg at 07/10/24 2052    prochlorperazine (COMPAZINE) injection 10 mg  10 mg Intravenous Q6H PRN Kelsey Cerna MD        prochlorperazine (COMPAZINE) tablet 10 mg  10 mg Oral Q6H PRN Kelsey Cerna MD        promethazine (PHENERGAN) tablet 12.5 mg  12.5 mg Oral Q6H PRN Kelsey Cerna MD        simethicone (MYLICON) chewable tablet 80 mg  80 mg Oral 4x Daily PRN Kelsey Cerna MD   80 mg at 07/10/24 1612    sodium chloride 0.45 % with KCl 20 mEq/L infusion  100 mL/hr Intravenous Continuous Kelsey Cerna  mL/hr at 07/10/24 1344 100 mL/hr at 07/10/24 1344    sodium chloride 0.9 % flush 1-10 mL  1-10 mL Intravenous PRN Kelsey Cerna MD        sodium chloride 0.9 % flush 10 mL  10 mL Intravenous Q12H Kelsey Cerna MD   10 mL at 07/10/24 2100    sodium chloride 0.9 % infusion 40 mL  40 mL Intravenous PRN Kelsey Cerna MD         Lab Results (all)       Procedure Component Value Units Date/Time    POC Glucose Once [[id number]]  (Abnormal) Collected: 07/11/24 1119    Specimen: Blood Updated:  07/11/24 1120     Glucose 131 mg/dL     POC Glucose Once [[id number]]  (Abnormal) Collected: 07/11/24 0735    Specimen: Blood Updated: 07/11/24 0736     Glucose 133 mg/dL     Comprehensive Metabolic Panel [[id number]]  (Abnormal) Collected: 07/11/24 0616    Specimen: Blood Updated: 07/11/24 0711     Glucose 128 mg/dL      BUN 7 mg/dL      Creatinine 0.43 mg/dL      Sodium 139 mmol/L      Potassium 4.1 mmol/L      Chloride 108 mmol/L      CO2 23.0 mmol/L      Calcium 7.8 mg/dL      Total Protein 5.7 g/dL      Albumin 3.4 g/dL      ALT (SGPT) 18 U/L      AST (SGOT) 12 U/L      Alkaline Phosphatase 80 U/L      Total Bilirubin 0.2 mg/dL      Globulin 2.3 gm/dL      Comment: Calculated Result        A/G Ratio 1.5 g/dL      BUN/Creatinine Ratio 16.3     Anion Gap 8.0 mmol/L      eGFR 125.5 mL/min/1.73     Narrative:      GFR Normal >60  Chronic Kidney Disease <60  Kidney Failure <15      CBC & Differential [[id number]]  (Abnormal) Collected: 07/11/24 0616    Specimen: Blood Updated: 07/11/24 0637    Narrative:      The following orders were created for panel order CBC & Differential.  Procedure                               Abnormality         Status                     ---------                               -----------         ------                     CBC Auto Differential[[id number]]        Abnormal            Final result                 Please view results for these tests on the individual orders.    Hemoglobin [[id number]]  (Abnormal) Collected: 07/11/24 0616    Specimen: Blood Updated: 07/11/24 0637     Hemoglobin 8.5 g/dL     CBC Auto Differential [[id number]]  (Abnormal) Collected: 07/11/24 0616    Specimen: Blood Updated: 07/11/24 0637     WBC 7.69 10*3/mm3      RBC 3.31 10*6/mm3      Hemoglobin 8.5 g/dL      Hematocrit 26.5 %      MCV 80.1 fL      MCH 25.7 pg      MCHC 32.1 g/dL      RDW 14.8 %      RDW-SD 43.8 fl      MPV 10.2 fL      Platelets 204 10*3/mm3      Neutrophil % 63.1 %      Lymphocyte % 26.3 %       Monocyte % 9.0 %      Eosinophil % 0.4 %      Basophil % 0.3 %      Immature Grans % 0.9 %      Neutrophils, Absolute 4.86 10*3/mm3      Lymphocytes, Absolute 2.02 10*3/mm3      Monocytes, Absolute 0.69 10*3/mm3      Eosinophils, Absolute 0.03 10*3/mm3      Basophils, Absolute 0.02 10*3/mm3      Immature Grans, Absolute 0.07 10*3/mm3      nRBC 0.0 /100 WBC     Hemoglobin [[id number]]  (Abnormal) Collected: 07/10/24 2350    Specimen: Blood Updated: 07/11/24 0006     Hemoglobin 8.5 g/dL     POC Glucose Once [[id number]]  (Normal) Collected: 07/10/24 2032    Specimen: Blood Updated: 07/10/24 2034     Glucose 123 mg/dL     Hemoglobin [[id number]]  (Abnormal) Collected: 07/10/24 1749    Specimen: Blood Updated: 07/10/24 1759     Hemoglobin 9.0 g/dL     POC Glucose Once [[id number]]  (Abnormal) Collected: 07/10/24 1610    Specimen: Blood Updated: 07/10/24 1611     Glucose 148 mg/dL     Hemoglobin [[id number]]  (Abnormal) Collected: 07/10/24 1545    Specimen: Blood Updated: 07/10/24 1555     Hemoglobin 9.3 g/dL     Hemoglobin [[id number]]  (Abnormal) Collected: 07/10/24 1107    Specimen: Blood Updated: 07/10/24 1117     Hemoglobin 9.3 g/dL     POC Glucose Once [[id number]]  (Abnormal) Collected: 07/10/24 1051    Specimen: Blood Updated: 07/10/24 1052     Glucose 167 mg/dL     POC Glucose Once [[id number]]  (Abnormal) Collected: 07/10/24 0707    Specimen: Blood Updated: 07/10/24 0709     Glucose 164 mg/dL     CBC & Differential [[id number]]  (Abnormal) Collected: 07/10/24 0533    Specimen: Blood Updated: 07/10/24 0648    Narrative:      The following orders were created for panel order CBC & Differential.  Procedure                               Abnormality         Status                     ---------                               -----------         ------                     CBC Auto Differential[[id number]]        Abnormal            Final result               Scan Slide[[id number]]                                       Final  result                 Please view results for these tests on the individual orders.    CBC Auto Differential [[id number]]  (Abnormal) Collected: 07/10/24 0533    Specimen: Blood Updated: 07/10/24 0648     WBC 7.53 10*3/mm3      RBC 3.81 10*6/mm3      Hemoglobin 9.9 g/dL      Hematocrit 30.3 %      MCV 79.5 fL      MCH 26.0 pg      MCHC 32.7 g/dL      RDW 14.4 %      RDW-SD 41.7 fl      MPV 11.3 fL      Platelets 199 10*3/mm3      Neutrophil % 78.5 %      Lymphocyte % 12.9 %      Monocyte % 8.2 %      Eosinophil % 0.1 %      Basophil % 0.0 %      Immature Grans % 0.3 %      Neutrophils, Absolute 5.91 10*3/mm3      Lymphocytes, Absolute 0.97 10*3/mm3      Monocytes, Absolute 0.62 10*3/mm3      Eosinophils, Absolute 0.01 10*3/mm3      Basophils, Absolute 0.00 10*3/mm3      Immature Grans, Absolute 0.02 10*3/mm3      nRBC 0.0 /100 WBC     Scan Slide [[id number]] Collected: 07/10/24 0533    Specimen: Blood Updated: 07/10/24 0648     RBC Morphology Normal     WBC Morphology Normal     Large Platelets Slight/1+    Comprehensive Metabolic Panel [[id number]]  (Abnormal) Collected: 07/10/24 0533    Specimen: Blood Updated: 07/10/24 0620     Glucose 165 mg/dL      BUN 7 mg/dL      Creatinine 0.56 mg/dL      Sodium 137 mmol/L      Potassium 4.0 mmol/L      Chloride 106 mmol/L      CO2 22.0 mmol/L      Calcium 8.3 mg/dL      Total Protein 6.3 g/dL      Albumin 3.5 g/dL      ALT (SGPT) 30 U/L      AST (SGOT) 18 U/L      Alkaline Phosphatase 83 U/L      Total Bilirubin 0.3 mg/dL      Globulin 2.8 gm/dL      Comment: Calculated Result        A/G Ratio 1.3 g/dL      BUN/Creatinine Ratio 12.5     Anion Gap 9.0 mmol/L      eGFR 117.8 mL/min/1.73     Narrative:      GFR Normal >60  Chronic Kidney Disease <60  Kidney Failure <15      Iron [[id number]]  (Abnormal) Collected: 07/10/24 0533    Specimen: Blood Updated: 07/10/24 0620     Iron 27 mcg/dL     POC Glucose Once [[id number]]  (Abnormal) Collected: 07/09/24 2038    Specimen: Blood  Updated: 07/09/24 2040     Glucose 183 mg/dL     POC Glucose Once [[id number]]  (Abnormal) Collected: 07/09/24 1634    Specimen: Blood Updated: 07/09/24 1636     Glucose 201 mg/dL     POC Glucose Once [[id number]]  (Abnormal) Collected: 07/09/24 1127    Specimen: Blood Updated: 07/09/24 1128     Glucose 225 mg/dL     Tissue Pathology Exam [[id number]] Collected: 07/09/24 0901    Specimen: Tissue from Stomach Updated: 07/09/24 0941    POC Pregnancy, Urine [[id number]]  (Normal) Collected: 07/09/24 0706    Specimen: Urine Updated: 07/09/24 0707     HCG, Urine, QL Negative     Lot Number [id number]     Internal Positive Control Passed     Internal Negative Control Passed     Expiration Date 1/28/2025    POC Glucose Once [[id number]]  (Normal) Collected: 07/09/24 0647    Specimen: Blood Updated: 07/09/24 0651     Glucose 124 mg/dL           Imaging Results (All)       Procedure Component Value Units Date/Time    FL Upper GI Water Soluble [[id number]] Collected: 07/10/24 0853     Updated: 07/10/24 2226    Narrative:      FL UPPER GI WATER SOLUBLE    Date of Exam: 7/10/2024 8:28 AM EDT    Indication: post sleeve, rule out leak.       Comparison: None available.    Technique:   radiograph of the abdomen was obtained. Under fluoroscopic observation, the patient ingested water-soluble contrast. Fluoroscopic imaging was obtained through the upper gastrointestinal tract.    Fluoroscopic Time: 24 seconds    Number of Images: 8 associated fluoroscopic loops were saved    Findings:   imaging reveals a gaseous abdomen. There is a suture line visible in the left upper quadrant. Surgical clips are visible in the gallbladder fossa. The oral phase of deglutition appeared normal. There is a small sized diverticulum visible on the   rightward aspect of the midesophagus. There was no evidence of a focal esophageal stricture. Examination of the stomach demonstrated postoperative changes that are consistent with a vertical sleeve  gastrectomy. No extravasation of contrast was seen. The   gastric folds, gastric mucosa appeared grossly normal. There was no delay in gastric emptying. The duodenal bulb, and duodenal C-loop appeared grossly normal.      Impression:      Impression:      1. Status post vertical sleeve gastrectomy. There was no evidence of extraluminal contrast. There was no delay in gastric emptying.  2. Esophageal diverticulum      Report dictated by: Felicita Gr PA-c      I have personally reviewed this case and agree with the findings above:    Electronically Signed: Samuel Abrams MD    7/10/2024 10:23 PM EDT    Workstation ID: [id number]             Operative/Procedure Notes (all)        Kelsey Cerna MD at 07/09/24 0804  Version 1 of 1         GASTRIC SLEEVE LAPAROSCOPIC WITH DAVINCI ROBOT AND ESOPHAGOGASTRODUODENOSCOPY  Progress Note    Merlemarya Acosta Wood  7/9/2024    Pre-op Diagnosis:   Obesity, Class III, BMI 40-49.9 (morbid obesity) [E66.01]       Post-Op Diagnosis Codes:     * Obesity, Class III, BMI 40-49.9 (morbid obesity) [E66.01]    Procedure/CPT® Codes:  RI LAPS GSTRC RSTRICTIV PX LONGITUDINAL GASTRECTOMY [52778]  RI ESOPHAGOGASTRODUODENOSCOPY TRANSORAL DIAGNOSTIC [84895]      Procedure(s):  GASTRIC SLEEVE LAPAROSCOPIC WITH DAVINCI ROBOT AND ESOPHAGOGASTRODUODENOSCOPY              Surgeon(s):  Kelsey Cerna MD    Anesthesia: General with Block    Staff:   Circulator: Al Ji RN  Scrub Person: Renetta Torres  Nursing Assistant: Sandra Horne CNA  Assistant: Jordy Breen PA-C  Assistant: Jordy Breen PA-C      Estimated Blood Loss: minimal    Urine Voided: * No values recorded between 7/9/2024  7:30 AM and 7/9/2024  9:16 AM *    Specimens:                Specimens       ID Source Type Tests Collected By Collected At Frozen?    A Stomach Tissue TISSUE PATHOLOGY EXAM   Kelsey Cerna MD 7/9/24 0901 No    Description: SUB-TOTAL GASTRECTOMY                   Drains: * No LDAs found *    Findings: No hiatal hernia. Microvesicular steatosis.        Complications: None immediate.    Assistant: Jordy Breen PA-C  was responsible for performing the following activities: Retraction, Suction, Irrigation, Suturing, Closing, Placing Dressing, and Held/Positioned Camera and their skilled assistance was necessary for the success of this case.    Kelsey Cerna MD     Date: 7/9/2024  Time: 09:24 EDT          Electronically signed by Kelsey Cerna MD at 07/09/24 0925       Kelsey Cerna MD at 07/09/24 0804  Version 1 of 1         OPERATIVE REPORT    DATE: 07/09/24    PATIENT: Merle Muir    PREOPERATIVE DIAGNOSIS:    1. Morbid obesity with comorbidities    POSTOPERATIVE DIAGNOSIS:    1. Morbid obesity with multiple comorbidities.    PROCEDURES PERFORMED:  1. Robotic assisted laparoscopic sleeve gastrectomy (85% subtotal vertical gastrectomy)    2.  Esophagogastroduodenoscopy    SURGEON:  Kelsey Cerna M.D.    ASSISTANT: JENNIFER Atkins was instrumental in the success of the case and provided retraction, suction, camera holding, and skin closure.    ANESTHESIA:  General endotracheal with TAP block    ESTIMATED BLOOD LOSS:   minimal    FLUIDS:  Crystalloids.    SPECIMENS:  Subtotal gastrectomy.    DRAINS:  None.    COUNTS:  Correct.    COMPLICATIONS:  None.    FINDINGS: No hiatal hernia. Microvesicular steatosis.    INDICATIONS:   Merle Muir is a 43 y.o. female with morbid obesity and associated comorbidities who presents for elective laparoscopic sleeve gastrectomy. The patient has undergone our extensive preoperative education, teaching, and consent process. Everything is in order and they wish to proceed.         DESCRIPTION OF PROCEDURE:   The patient was brought to the operating room and placed supine upon the operating room table. SCDs were placed. The patient underwent uneventful general endotracheal anesthesia and  bilateral TAP blocks per the anesthesiology staff.  She received subcutaneous Lovenox and was given Ancef. The abdomen was prepped with ChloraPrep and draped in the usual sterile fashion. An Ioban was used as well.  Timeout was performed.     A small transverse incision was made a few centimeters above and to the left of the umbilicus and the peritoneal cavity entered under direct camera visualization using an 11 mm 0° laparoscope and an OptiView trocar.  The abdomen was then insufflated to a pressure of 16 mmHg with CO2 gas. Exploratory laparoscopy revealed no evidence of injury from the entrance technique. The gallbladder was absent.  The liver microvesicular steatosis and was normal in size without evidence of gross hepatomegaly or fibrosis.  Two 8 mm ports were placed to the patient's left, lateral of the  first port, and a 12 mm port was placed in the right upper quadrant.  The robot was docked, all safety checks were performed, and I moved to the robot console.     A 2-0 Stratafix suture was placed to make a liver sling with bites of the peritoneum and the right mey of the diaphragm, and the left lobe of the liver was elevated. The stomach was decompressed with an 18 St Helenian orogastric tube, which was then positioned in the antrum. The greater curvature vessels were taken down with the vessel sealer energy device beginning at the midpoint of the stomach and continued up to the angle of His, including the short gastrics. The left mey was completely exposed and there was no sign of hiatal hernia here or anteriorly. This was photodocumented. The GE junction fat pad was elevated to make a clear landing zone for the stapler later. The greater curvature vessels were taken down with the energy device extending distally within 3 cm of the pylorus. Filmy adhesions to the stomach were taken down posteriorly.      A 36 St Helenian bougie was inserted and positioned along the lesser curvature of the stomach.  The stomach was  marked at 1 cm from the angle of His, 3 cm from the incisura, and 5 cm from the pylorus using an ink saturated Kittner.  1 mg of IV glucagon was given to relax gastric smooth muscle.  Using the bougie as a template, a vertical sleeve gastrectomy was fashioned with successive staple loads: the first load was blue, and the following 5 loads were white.   The staple line was examined and was hemostatic. The gastrectomy specimen was removed through the 12 mm port site. The specimen was later examined, and the staples were well-formed. Palpation of the specimen revealed no masses. The staple line of the sleeve gastrectomy revealed staples that were well-formed. The upper abdomen was flooded with saline, and endoscopy was performed.     The flexible endoscope was passed transorally down to the pylorus easily. The sleeve extended to within 6 cm proximal to the pylorus and was hemostatic. The sleeve was not narrow or twisted. There was no hiatal hernia or distal esophagitis. The rest of the esophagus was normal. The scope was removed. The leak test was normal.        I rescrubbed and suctioned the irrigation fluid from the upper abdomen, making sure it was dry. The staple line on the stomach was hemostatic.  The staple line was treated with 4 ml of aerosolized Tisseel fibrin glue. The liver stitch was removed easily. The 12 mm port was removed under direct visualization and there was no bleeding. This incision was closed with 0-Vicryl transfascial suture using a suture passer under direct visualization in a horizontal mattress fashion. All other ports were removed and then replaced under direct visualization and all of these were hemostatic. The instruments were removed and the abdomen was desufflated. The ports were removed.  3-0 Monocryl plus was used to close skin incisions with interrupted sutures. Skin glue was placed. 10 mg of IV Barhemsys was given at the end of the case.  The patient was awakened and taken to  "recovery in good condition, having tolerated the procedure well. All sponge, needle, and instrument counts were correct.           Electronically signed by Kelsey Cerna MD at 07/09/24 0926          Physician Progress Notes (all)        Billy Chawla MD at 07/11/24 1052          Cc: POD#2 RSG/HHR  \"so-so\"    She is sitting partially up in her hospital bed that is padded.  Seems groggy with unusual affect and poor historian.  She says she has pain \"that comes and goes\".  Nausea \"that comes and goes\", has had some of her protein shake.  Passed flatus and had a bowel movement \"finally\".  Denies melena.  Discussed that she was combative in recovery room and that her H&H has dropped from baseline.  When asked if she gets orthostatic symptoms while walking she says \"sometimes\".  Incentive spirometer less than 1000, cannot seem to use appropriate technique even with instruction    No fever pulse 88 respirations 20 blood pressure 120/69.  Room air sat 93%. UO 2250 - 750.  She is no apparent distress.  Not particularly pale.  Abdomen soft, nontender/appropriate, nondistended, bowel sounds normal active.  Wounds look okay.  No abdominal wall ecchymosis.    CMP normal except for glucose of 128 creatinine 0.43 chloride 108 calcium 7.8 total protein 5.7 abdomen 3.4.  White count normal at 7.7 with normal differential.  H&H:  7/10 1107 9.3           1545 9.3           1749  9.0           2350  8.5  7/11  0616 8.5    Formal upper GI report unremarkable.    Impression: Postop day #2 robotic sleeve gastrectomy.  H&H has stabilized without evidence of ongoing bleeding.  No significant increased pain and no ecchymosis, will hold off on CT for now.  Disabled with history of seizures, chronic pain, PTSD, cranial neurotransmitter with unusual affect, poor ability to express her symptoms, and inability to use her spirometer appropriately.  Low total protein and albumin.    Plan: Continue out of bed, pulmonary toilet, VTE " "prophylaxis with SCDs and ambulation, stage I diet.  Check prealbumin level in the morning.  Continue serial H&H.  If stable and tolerating her diet better hopefully home tomorrow.    Electronically signed by Billy Chawla MD at 07/11/24 1101       Kelsey Cerna MD at 07/10/24 1528          Bariatric Surgery     LOS: 1 day   Patient Care Team:  Kush Marion MD as PCP - General (Adolescent Medicine)  Rajiv Ingram MD as Consulting Physician (Cardiology)  Nahum Deleon MD as Consulting Physician (Neurology)    Chief Complaint:  post op    Subjective     Interval History:    In recovery room yesterday, she had emergence delirium, including combativeness, thrashing, hollering, and required IV Versed to calm down.  She has no memory of her recovery room experience.    This morning, Hgb dropped significantly.  VS overnight were not remarkable.  Transient tachycardia to 110.    Has had no reports of generalized tonic-clonic or absence seizure activity.    Doing fairly well this afternoon.  C/o headache and incisional pain.  C/o chest pain after drinking that improved with Gas-Ex.  Actually just now taking her first oral pain med of the day.   Tolerating PO. But has only had a few sips of protein shake. Denies N/V.  No fevers.  Pain controlled.  Ambulating.  Voiding.   observed with great encouragement.    LUE infiltrated, hand is swollen.    Objective     Vital Signs  Blood pressure 134/70, pulse 92, temperature 98.7 °F (37.1 °C), temperature source Oral, resp. rate 18, height 157.5 cm (62\"), weight 117 kg (257 lb 11.2 oz), SpO2 94%.    Physical Exam:  General: Alert, NAD  Abdomen: soft, appropriate, incisions okay.  Slightly distended.  No abdominal or flank bruising, no apparent rectus swelling.  No peritonitis.    Extremities: warm, (+) SCDs.  Left hand is swollen.  Normal sensation and capillary refill, but decreased  strength on left.     Results Review:     I reviewed " the patient's new clinical results.  I reviewed the patient's new imaging results and agree with the interpretation.    Labs:  Lab Results (last 24 hours)       Procedure Component Value Units Date/Time    Hemoglobin [[id number]]  (Abnormal) Collected: 07/10/24 1107    Specimen: Blood Updated: 07/10/24 1117     Hemoglobin 9.3 g/dL     POC Glucose Once [[id number]]  (Abnormal) Collected: 07/10/24 1051    Specimen: Blood Updated: 07/10/24 1052     Glucose 167 mg/dL     POC Glucose Once [[id number]]  (Abnormal) Collected: 07/10/24 0707    Specimen: Blood Updated: 07/10/24 0709     Glucose 164 mg/dL     CBC & Differential [[id number]]  (Abnormal) Collected: 07/10/24 0533    Specimen: Blood Updated: 07/10/24 0648    Narrative:      The following orders were created for panel order CBC & Differential.  Procedure                               Abnormality         Status                     ---------                               -----------         ------                     CBC Auto Differential[[id number]]        Abnormal            Final result               Scan Slide[[id number]]                                       Final result                 Please view results for these tests on the individual orders.    CBC Auto Differential [[id number]]  (Abnormal) Collected: 07/10/24 0533    Specimen: Blood Updated: 07/10/24 0648     WBC 7.53 10*3/mm3      RBC 3.81 10*6/mm3      Hemoglobin 9.9 g/dL      Hematocrit 30.3 %      MCV 79.5 fL      MCH 26.0 pg      MCHC 32.7 g/dL      RDW 14.4 %      RDW-SD 41.7 fl      MPV 11.3 fL      Platelets 199 10*3/mm3      Neutrophil % 78.5 %      Lymphocyte % 12.9 %      Monocyte % 8.2 %      Eosinophil % 0.1 %      Basophil % 0.0 %      Immature Grans % 0.3 %      Neutrophils, Absolute 5.91 10*3/mm3      Lymphocytes, Absolute 0.97 10*3/mm3      Monocytes, Absolute 0.62 10*3/mm3      Eosinophils, Absolute 0.01 10*3/mm3      Basophils, Absolute 0.00 10*3/mm3      Immature Grans, Absolute 0.02  10*3/mm3      nRBC 0.0 /100 WBC     Scan Slide [[id number]] Collected: 07/10/24 0533    Specimen: Blood Updated: 07/10/24 0648     RBC Morphology Normal     WBC Morphology Normal     Large Platelets Slight/1+    Comprehensive Metabolic Panel [[id number]]  (Abnormal) Collected: 07/10/24 0533    Specimen: Blood Updated: 07/10/24 0620     Glucose 165 mg/dL      BUN 7 mg/dL      Creatinine 0.56 mg/dL      Sodium 137 mmol/L      Potassium 4.0 mmol/L      Chloride 106 mmol/L      CO2 22.0 mmol/L      Calcium 8.3 mg/dL      Total Protein 6.3 g/dL      Albumin 3.5 g/dL      ALT (SGPT) 30 U/L      AST (SGOT) 18 U/L      Alkaline Phosphatase 83 U/L      Total Bilirubin 0.3 mg/dL      Globulin 2.8 gm/dL      Comment: Calculated Result        A/G Ratio 1.3 g/dL      BUN/Creatinine Ratio 12.5     Anion Gap 9.0 mmol/L      eGFR 117.8 mL/min/1.73     Narrative:      GFR Normal >60  Chronic Kidney Disease <60  Kidney Failure <15      Iron [[id number]]  (Abnormal) Collected: 07/10/24 0533    Specimen: Blood Updated: 07/10/24 0620     Iron 27 mcg/dL     POC Glucose Once [[id number]]  (Abnormal) Collected: 07/09/24 2038    Specimen: Blood Updated: 07/09/24 2040     Glucose 183 mg/dL     POC Glucose Once [[id number]]  (Abnormal) Collected: 07/09/24 1634    Specimen: Blood Updated: 07/09/24 1636     Glucose 201 mg/dL             Imaging:  Imaging Results (Last 24 Hours)       Procedure Component Value Units Date/Time    FL Upper GI Water Soluble [[id number]] Resulted: 07/10/24 0828     Updated: 07/10/24 0844              Assessment & Plan     POD # 1 s/p RSG.    Doing fairly well.  UGI normal post-op, images and report reviewed.  IV iron given for low Fe.      Lovenox was held this morning for >4 gram drop in Hgb.  VS have been good, save transient tachycardia to 110s overnight.  She is not pale, and is tolerating ambulation without severe dizziness or syncope.   Serial Hgb, hold Lovenox, T&S performed today.  Transfuse for symptomatic anemia  or Hgb <8.  If Hgb continues to drop significantly, will need CT to evaluate. Had brief discussion of the possible outcomes of post-operative acute blood loss anemia, being possible need for transfusion, possible evacuation of hematoma if one is found on CT and she is severely symptomatic with unrelenting abdominal pain.     Elevate left hand for swelling, warm compress as needed.    Pt reeducated on pulmonary toilet.     Continue OOB/ PT/ mechanical DVT prophx.          Kelsey Cerna MD  07/10/24  15:28 EDT              Electronically signed by Kelsey Cerna MD at 07/10/24 3566

## 2024-07-12 ENCOUNTER — READMISSION MANAGEMENT (OUTPATIENT)
Dept: CALL CENTER | Facility: HOSPITAL | Age: 44
End: 2024-07-12
Payer: MEDICAID

## 2024-07-12 VITALS
TEMPERATURE: 98.5 F | WEIGHT: 257.7 LBS | BODY MASS INDEX: 47.42 KG/M2 | OXYGEN SATURATION: 96 % | DIASTOLIC BLOOD PRESSURE: 47 MMHG | HEART RATE: 95 BPM | SYSTOLIC BLOOD PRESSURE: 127 MMHG | HEIGHT: 62 IN | RESPIRATION RATE: 22 BRPM

## 2024-07-12 LAB
ALBUMIN SERPL-MCNC: 3.5 G/DL (ref 3.5–5.2)
ALBUMIN/GLOB SERPL: 1.3 G/DL
ALP SERPL-CCNC: 99 U/L (ref 39–117)
ALT SERPL W P-5'-P-CCNC: 17 U/L (ref 1–33)
ANION GAP SERPL CALCULATED.3IONS-SCNC: 9 MMOL/L (ref 5–15)
AST SERPL-CCNC: 13 U/L (ref 1–32)
BASOPHILS # BLD AUTO: 0.04 10*3/MM3 (ref 0–0.2)
BASOPHILS NFR BLD AUTO: 0.5 % (ref 0–1.5)
BILIRUB SERPL-MCNC: 0.5 MG/DL (ref 0–1.2)
BUN SERPL-MCNC: 8 MG/DL (ref 6–20)
BUN/CREAT SERPL: 17.4 (ref 7–25)
CALCIUM SPEC-SCNC: 8.2 MG/DL (ref 8.6–10.5)
CHLORIDE SERPL-SCNC: 105 MMOL/L (ref 98–107)
CO2 SERPL-SCNC: 24 MMOL/L (ref 22–29)
CREAT SERPL-MCNC: 0.46 MG/DL (ref 0.57–1)
DEPRECATED RDW RBC AUTO: 43.5 FL (ref 37–54)
EGFRCR SERPLBLD CKD-EPI 2021: 123.5 ML/MIN/1.73
EOSINOPHIL # BLD AUTO: 0.2 10*3/MM3 (ref 0–0.4)
EOSINOPHIL NFR BLD AUTO: 2.4 % (ref 0.3–6.2)
ERYTHROCYTE [DISTWIDTH] IN BLOOD BY AUTOMATED COUNT: 14.7 % (ref 12.3–15.4)
GLOBULIN UR ELPH-MCNC: 2.7 GM/DL
GLUCOSE BLDC GLUCOMTR-MCNC: 128 MG/DL (ref 70–130)
GLUCOSE BLDC GLUCOMTR-MCNC: 138 MG/DL (ref 70–130)
GLUCOSE SERPL-MCNC: 113 MG/DL (ref 65–99)
HCT VFR BLD AUTO: 28.9 % (ref 34–46.6)
HGB BLD-MCNC: 9.1 G/DL (ref 12–15.9)
IMM GRANULOCYTES # BLD AUTO: 0.08 10*3/MM3 (ref 0–0.05)
IMM GRANULOCYTES NFR BLD AUTO: 1 % (ref 0–0.5)
LYMPHOCYTES # BLD AUTO: 2.53 10*3/MM3 (ref 0.7–3.1)
LYMPHOCYTES NFR BLD AUTO: 30.3 % (ref 19.6–45.3)
MCH RBC QN AUTO: 25.6 PG (ref 26.6–33)
MCHC RBC AUTO-ENTMCNC: 31.5 G/DL (ref 31.5–35.7)
MCV RBC AUTO: 81.4 FL (ref 79–97)
MONOCYTES # BLD AUTO: 0.61 10*3/MM3 (ref 0.1–0.9)
MONOCYTES NFR BLD AUTO: 7.3 % (ref 5–12)
NEUTROPHILS NFR BLD AUTO: 4.88 10*3/MM3 (ref 1.7–7)
NEUTROPHILS NFR BLD AUTO: 58.5 % (ref 42.7–76)
NRBC BLD AUTO-RTO: 0 /100 WBC (ref 0–0.2)
PLATELET # BLD AUTO: 237 10*3/MM3 (ref 140–450)
PMV BLD AUTO: 10.5 FL (ref 6–12)
POTASSIUM SERPL-SCNC: 4.2 MMOL/L (ref 3.5–5.2)
PREALB SERPL-MCNC: 16.1 MG/DL (ref 20–40)
PROT SERPL-MCNC: 6.2 G/DL (ref 6–8.5)
RBC # BLD AUTO: 3.55 10*6/MM3 (ref 3.77–5.28)
SODIUM SERPL-SCNC: 138 MMOL/L (ref 136–145)
WBC NRBC COR # BLD AUTO: 8.34 10*3/MM3 (ref 3.4–10.8)

## 2024-07-12 PROCEDURE — 94664 DEMO&/EVAL PT USE INHALER: CPT

## 2024-07-12 PROCEDURE — 80053 COMPREHEN METABOLIC PANEL: CPT | Performed by: SURGERY

## 2024-07-12 PROCEDURE — 82948 REAGENT STRIP/BLOOD GLUCOSE: CPT

## 2024-07-12 PROCEDURE — 99024 POSTOP FOLLOW-UP VISIT: CPT | Performed by: SURGERY

## 2024-07-12 PROCEDURE — 94799 UNLISTED PULMONARY SVC/PX: CPT

## 2024-07-12 PROCEDURE — 85025 COMPLETE CBC W/AUTO DIFF WBC: CPT | Performed by: SURGERY

## 2024-07-12 PROCEDURE — 84134 ASSAY OF PREALBUMIN: CPT | Performed by: SURGERY

## 2024-07-12 PROCEDURE — 94761 N-INVAS EAR/PLS OXIMETRY MLT: CPT

## 2024-07-12 RX ORDER — ONDANSETRON 4 MG/1
4 TABLET, ORALLY DISINTEGRATING ORAL EVERY 4 HOURS PRN
Qty: 10 TABLET | Refills: 0 | Status: SHIPPED | OUTPATIENT
Start: 2024-07-12

## 2024-07-12 RX ORDER — OXYCODONE HYDROCHLORIDE 5 MG/1
5 TABLET ORAL EVERY 4 HOURS PRN
Qty: 10 TABLET | Refills: 0 | Status: SHIPPED | OUTPATIENT
Start: 2024-07-12

## 2024-07-12 RX ADMIN — METHOCARBAMOL 500 MG: 500 TABLET ORAL at 08:16

## 2024-07-12 RX ADMIN — PANTOPRAZOLE SODIUM 40 MG: 40 TABLET, DELAYED RELEASE ORAL at 08:16

## 2024-07-12 RX ADMIN — CARIPRAZINE 3 MG: 3 CAPSULE, GELATIN COATED ORAL at 08:16

## 2024-07-12 RX ADMIN — LEVETIRACETAM 750 MG: 500 TABLET, FILM COATED ORAL at 08:15

## 2024-07-12 RX ADMIN — OXYCODONE HYDROCHLORIDE 5 MG: 5 TABLET ORAL at 03:10

## 2024-07-12 RX ADMIN — BUDESONIDE AND FORMOTEROL FUMARATE DIHYDRATE 2 PUFF: 160; 4.5 AEROSOL RESPIRATORY (INHALATION) at 08:52

## 2024-07-12 NOTE — CASE MANAGEMENT/SOCIAL WORK
Continued Stay Note  [facility]     Patient Name: Merle Muir  MRN: [id number]  Today's Date: 7/12/2024    Admit Date: 7/9/2024    Plan: home   Discharge Plan       Row Name 07/12/24 1349       Plan    Plan home    Patient/Family in Agreement with Plan yes    Plan Comments I spoke with this patient regarding her discharge home today. She is in agreement, and her SO can transport. She denies having any further discharge planning needs.    Final Discharge Disposition Code 01 - home or self-care                   Discharge Codes    No documentation.                 Expected Discharge Date and Time       Expected Discharge Date Expected Discharge Time    Jul 12, 2024               Valeri Cardona RN

## 2024-07-12 NOTE — PLAN OF CARE
Goal Outcome Evaluation:              Outcome Evaluation: Patient ready for d/c. video watched.

## 2024-07-12 NOTE — PROGRESS NOTES
"Cc: POD#3 RSG/HHR  \" I was able to get out of bed myself today\"    She was laying down in bed, her fiancé came in USP through the interaction.  She says she is sore but overall feels well and wants to go home.  Had another bowel movement she is unsure if it had any blood, did not look at it.  She says she is talking her shakes okay.  Ambulating and voiding okay, denies orthostatic symptoms..  No pulmonary complaints, spirometer 1500 observed, better technique.      No fever or tachycardia pulse 80 respirations 22 blood pressure 142/79 room air sat 97%.  UO 2350 - 600.  She is no apparent distress.  Abdomen soft and benign, bowel sounds active.  Wounds with slight ecchymosis, no cellulitis or induration.    CMP normal except glucose of 113 creatinine 0.46 calcium 8.2.  White count normal 8.34 with a normal differential.  H&H stable at 9.1 and 20.9.    Impression: Postoperative day #3 robotic sleeve gastrectomy and hiatal hernia repair.  Doing okay clinically, would like to go home and does meet discharge criteria.  Postoperative drop in H&H without evidence of GI bleeding or excessive abdominal pain.  H&H remained stable without evidence of active bleeding.    Plan: Discharge home.  Discharge instructions discussed.  Follow-up in the office in 1 to 2 weeks and call in the meantime with any problems questions or concerns.  Reiterated avoiding ulcerogenic agents for the next 6 to 8 weeks.  Do not resume GLP-1.  Prescription for Roxicodone 5 mg #10, refill of her Zofran 4 mg ODT #10, continue home PPI.  See orders.   "

## 2024-07-12 NOTE — PLAN OF CARE
Goal Outcome Evaluation:    A&Ox4, VSS, NSR, on room air, feeling a little better today, more awake and walked further in the parks this shift. Cooperative with POC, deep breathing and ambulation encourgaged.

## 2024-07-13 NOTE — OUTREACH NOTE
Prep Survey      Flowsheet Row Responses   [facility] facility patient discharged from? [place]   Is LACE score < 7 ? No   Eligibility Readm Mgmt   Discharge diagnosis GASTRIC SLEEVE LAPAROSCOPIC WITH DAVINCI ROBOT AND ESOPHAGOGASTRODUODENOSCOPY   Does the patient have one of the following disease processes/diagnoses(primary or secondary)? General Surgery   Does the patient have Home health ordered? No   Is there a DME ordered? No   Prep survey completed? Yes            Amberly MOSELEY - Registered Nurse

## 2024-07-15 ENCOUNTER — OFFICE VISIT (OUTPATIENT)
Dept: BARIATRICS/WEIGHT MGMT | Facility: CLINIC | Age: 44
End: 2024-07-15
Payer: MEDICAID

## 2024-07-15 VITALS
HEART RATE: 109 BPM | WEIGHT: 229.6 LBS | TEMPERATURE: 96.9 F | SYSTOLIC BLOOD PRESSURE: 112 MMHG | HEIGHT: 62 IN | DIASTOLIC BLOOD PRESSURE: 72 MMHG | BODY MASS INDEX: 42.25 KG/M2

## 2024-07-15 DIAGNOSIS — Z98.84 S/P BARIATRIC SURGERY: Primary | ICD-10-CM

## 2024-07-15 PROCEDURE — 1159F MED LIST DOCD IN RCRD: CPT | Performed by: PHYSICIAN ASSISTANT

## 2024-07-15 PROCEDURE — 99024 POSTOP FOLLOW-UP VISIT: CPT | Performed by: PHYSICIAN ASSISTANT

## 2024-07-15 PROCEDURE — 1160F RVW MEDS BY RX/DR IN RCRD: CPT | Performed by: PHYSICIAN ASSISTANT

## 2024-07-15 RX ORDER — OMEPRAZOLE 40 MG/1
40 CAPSULE, DELAYED RELEASE ORAL DAILY
Qty: 60 CAPSULE | Refills: 0 | Status: SHIPPED | OUTPATIENT
Start: 2024-07-15

## 2024-07-15 NOTE — DISCHARGE SUMMARY
Discharge Summary    Patient name: Merle Muir    Medical record number: [id number]    Admission date: 7/9/2024  Discharge date:  7/12/2024    Attending physician: Kelsey Cerna MD    Primary care physician: Kush Marion MD    Condition on discharge: Stable    Primary Diagnoses:  Morbid obesity with co-morbidities    Operative Procedure:  7/9/2024 robotic sleeve gastrectomy    Hospital Course: The patient is a 43 y.o. female that was admitted to the hospital after elective robotic sleeve gastrectomy.  In the recovery room, she had emergence delirium with combativeness.  On POD#1, UGI was without obstruction or leak. Her hemoglobin dropped by >4 grams the morning after surgery.  Serial Hemoglobins were stable, and her anemia was asymptomatic and did not require a blood transfusion.  She had poor oral intake and poor pulmonary toilet.  By POD#3, was ambulating well, tolerating stage 1 diet, and felt ready to be discharged.  Please see daily progress notes for full details of hospital stay.  Merle Muri was discharged home in good condition with instructions to follow up in the office in 1 week.      Discharge medications:      Discharge Medications        New Medications        Instructions Start Date   oxyCODONE 5 MG immediate release tablet  Commonly known as: Roxicodone   5 mg, Oral, Every 4 Hours PRN             Changes to Medications        Instructions Start Date   ondansetron ODT 4 MG disintegrating tablet  Commonly known as: ZOFRAN-ODT  What changed: See the new instructions.   4 mg, Translingual, Every 4 Hours PRN             Continue These Medications        Instructions Start Date   Acetaminophen 500 MG capsule   1,000 mg, Oral, Every 4 Hours PRN      albuterol sulfate  (90 Base) MCG/ACT inhaler  Commonly known as: PROVENTIL HFA;VENTOLIN HFA;PROAIR HFA   Inhale 2 puffs Every 6 (Six) Hours As Needed.      cyclobenzaprine 10 MG tablet  Commonly known as: FLEXERIL    10 mg, Oral, 3 Times Daily PRN      fluticasone 50 MCG/ACT nasal spray  Commonly known as: FLONASE   2 sprays into the nostril(s) as directed by provider Daily.      hydrOXYzine pamoate 25 MG capsule  Commonly known as: VISTARIL   25 mg, Oral, 3 Times Daily PRN      insulin glargine 100 UNIT/ML injection  Commonly known as: LANTUS, SEMGLEE   10 Units, Subcutaneous, Daily      levETIRAcetam 750 MG tablet  Commonly known as: KEPPRA   750 mg, Oral, 2 Times Daily      Melatonin 5 MG capsule   1-2 each, Oral, Nightly, 1-2 tabs as needed for sleep       methocarbamol 500 MG tablet  Commonly known as: ROBAXIN   500 mg, Oral, 3 Times Daily      mometasone-formoterol 100-5 MCG/ACT inhaler  Commonly known as: DULERA 100   2 puffs, Inhalation, 2 Times Daily - RT      omeprazole 20 MG capsule  Commonly known as: priLOSEC   20 mg, Oral, Daily      OneTouch Ultra test strip  Generic drug: glucose blood       prazosin 5 MG capsule  Commonly known as: MINIPRESS   5 mg, Oral, Nightly      pregabalin 50 MG capsule  Commonly known as: LYRICA       Vitamin D3 125 MCG (5000 UT) tablet dispersible   Take 5,000 Units by mouth Daily.      Vraylar 3 MG capsule capsule  Generic drug: Cariprazine HCl              Stop These Medications      meloxicam 15 MG tablet  Commonly known as: MOBIC     naproxen 500 MG tablet  Commonly known as: NAPROSYN            ASK your doctor about these medications        Instructions Start Date   colestipol 1 g tablet  Commonly known as: COLESTID   1 g, Oral, 2 Times Daily      glipizide 5 MG ER tablet  Commonly known as: GLUCOTROL XL   5 mg, 2 Times Daily               Discharge instructions:  Per Bariatric manual      Follow-up appointment: Follow up with Bariatric PA in the office in 1 week.

## 2024-07-15 NOTE — PROGRESS NOTES
[facility] Bariatric Surgery  [address]  [address]  [facility] [id number] [phone]      Patient Name:  Merle Muir  :  1980      Date of Visit: 07/15/2024      Reason for Visit:  POD #6    HPI:  Merle Muir is a 43 y.o. female s/p robotic LSG 24 w/ Dr. Cerna    Discharged on POD#3.  Doing okay.  Still has abdominal soreness, mostly on the (L) side, ecchymosis improving.  Getting 30-45g prot/day, forcing herself to get down 1 protein shake/day, but does not like them, can't stand to drink anymore.  Has not yet been able to find unflavored protein powder.  Denies dysphagia, reflux, nausea, vomiting, constipation, pulmonary issues, and fevers.  Tolerating diet progression - on stage 1.  Drinking 64 fluid oz/day.  Voiding well.  Not yet taking vitamins.  On Omeprazole (20mg).  Holding NSAIDs + GLP1.  On Lantus - PCP monitoring.  Ambulating frequently.     Presurgery weight: 249 pounds.  Today's weight is 104 kg (229 lb 9.6 oz) pounds, today's  Body mass index is 41.99 kg/m²., and weight loss since surgery is 20 pounds.       Final Diagnosis   Stomach, subtotal gastrectomy:  Portion of stomach (23.8 x 4.6 x 4.0 cm) with mild to moderate chronic inactive gastritis  Immunohistochemical stain for H. pylori is negative (no organisms are identified)  Negative for intestinal metaplasia, dysplasia, or malignancy       Past Medical History:   Diagnosis Date    Anxiety and depression     Asthma     Back pain     Naproxen and Flexeril; hx of steroid injections    Chest pain     Edentulous     Epilepsy     Keppra; has neurotransmiter in place. Dr. Deleon    Fatty liver     Fibromyalgia     GERD (gastroesophageal reflux disease)     Omeprazole; EGD in ; hx of h pylori    History of Helicobacter pylori infection     s/p treatment, HPSA (-) 2024    History of renal insufficiency     History of suicide attempt     at age 18 and 20    HLD (hyperlipidemia)      Lactose intolerance     Migraine     Nausea     chronic    BALAJI on CPAP     PONV (postoperative nausea and vomiting)     PTSD (post-traumatic stress disorder)     Right bundle branch block     Type 2 diabetes mellitus     dx in     Urinary incontinence     s/p bladder stimulator    Wears contact lenses     Wears eyeglasses      Past Surgical History:   Procedure Laterality Date    BLADDER SURGERY      stimulator     SECTION      lower transverse    CRANIAL NEUROSTIMULATOR INSERTION/REPLACEMENT      battery replaced in     DIAGNOSTIC LAPAROSCOPY      ovarian cysts    ENDOSCOPY AND COLONOSCOPY      GASTRIC SLEEVE LAPAROSCOPIC N/A 2024    Procedure: GASTRIC SLEEVE LAPAROSCOPIC WITH DAVINCI ROBOT AND ESOPHAGOGASTRODUODENOSCOPY;  Surgeon: Kelsey Cerna MD;  Location: [facility];  Service: Robotics - DaVinci;  Laterality: N/A;    LAPAROSCOPIC CHOLECYSTECTOMY      gallstones    NASAL SINUS SURGERY      TEETH EXTRACTION       Outpatient Medications Marked as Taking for the 7/15/24 encounter (Office Visit) with Laura Olvera PA   Medication Sig Dispense Refill    Acetaminophen 500 MG capsule Take 2 capsules by mouth Every 4 (Four) Hours As Needed for Moderate Pain or Fever.      albuterol sulfate  (90 Base) MCG/ACT inhaler Inhale 2 puffs Every 6 (Six) Hours As Needed.      Cholecalciferol (Vitamin D3) 125 MCG (5000 UT) tablet dispersible Take 5,000 Units by mouth Daily.      cyclobenzaprine (FLEXERIL) 10 MG tablet Take 1 tablet by mouth 3 (Three) Times a Day As Needed.      fluticasone (FLONASE) 50 MCG/ACT nasal spray 2 sprays into the nostril(s) as directed by provider Daily.      hydrOXYzine pamoate (VISTARIL) 25 MG capsule Take 1 capsule by mouth 3 (Three) Times a Day As Needed.      insulin glargine (LANTUS, SEMGLEE) 100 UNIT/ML injection Inject 10 Units under the skin into the appropriate area as directed Daily.      levETIRAcetam (KEPPRA) 750 MG tablet Take 1  "tablet by mouth 2 (Two) Times a Day.      Melatonin 5 MG capsule Take 1-2 each by mouth Every Night. 1-2 tabs as needed for sleep      methocarbamol (ROBAXIN) 500 MG tablet Take 1 tablet by mouth 3 (Three) Times a Day.      mometasone-formoterol (DULERA 100) 100-5 MCG/ACT inhaler Inhale 2 puffs 2 (Two) Times a Day.      ondansetron ODT (ZOFRAN-ODT) 4 MG disintegrating tablet Place 1 tablet on the tongue Every 4 (Four) Hours As Needed for Nausea or Vomiting. 10 tablet 0    OneTouch Ultra test strip       oxyCODONE (Roxicodone) 5 MG immediate release tablet Take 1 tablet by mouth Every 4 (Four) Hours As Needed for Moderate Pain. 10 tablet 0    prazosin (MINIPRESS) 5 MG capsule Take 1 capsule by mouth Every Night.      pregabalin (LYRICA) 50 MG capsule       Vraylar 3 MG capsule capsule       [DISCONTINUED] omeprazole (priLOSEC) 20 MG capsule Take 1 capsule by mouth Daily.       Allergies   Allergen Reactions    Minocycline Other (See Comments)     Renal failure    Rifampin Other (See Comments)     Renal failure    Sudafed [Pseudoephedrine Hcl] Other (See Comments)     Pt states it shuts her body down    Asa [Aspirin] Other (See Comments)     Unknown    Lactose Intolerance (Gi) GI Intolerance       Social History     Socioeconomic History    Marital status: Single   Tobacco Use    Smoking status: Never     Passive exposure: Never    Smokeless tobacco: Never   Vaping Use    Vaping status: Never Used   Substance and Sexual Activity    Alcohol use: Yes     Comment: once a year    Drug use: Never    Sexual activity: Yes     Partners: Male     Birth control/protection: None     Social History     Social History Narrative    Lives in [place], [place]  with 1 child and is currently disabled.        /72 (BP Location: Left arm, Patient Position: Sitting)   Pulse 109   Temp 96.9 °F (36.1 °C)   Ht 157.5 cm (62\")   Wt 104 kg (229 lb 9.6 oz)   BMI 41.99 kg/m²     Physical Exam  Constitutional:       Appearance: " She is well-developed. She is not ill-appearing.   Eyes:      General: No scleral icterus.  Cardiovascular:      Rate and Rhythm: Tachycardia present.   Pulmonary:      Effort: Pulmonary effort is normal.   Abdominal:      Palpations: Abdomen is soft.      Tenderness: There is no abdominal tenderness.      Comments: incisions healing well, (L) abdominal bruising - improving   Musculoskeletal:         General: Normal range of motion.   Skin:     General: Skin is warm and dry.      Findings: No rash.   Neurological:      Mental Status: She is alert.   Psychiatric:         Behavior: Behavior is cooperative.         Judgment: Judgment normal.           Assessment:   POD #6 s/p robotic LSG 7/9/24 w/ Dr. Cerna      Class 3 Severe Obesity (BMI >=40). Obesity-related health conditions include the following:  see above . Obesity is improving with treatment. BMI is is above average; BMI management plan is completed. We discussed  see plan .         Plan:  Doing fine.  Continue to advance diet per manual.  Increase protein to 100g/day.  Increase exercise/activity as tolerated.  Reviewed lifting restrictions, nothing >25 lbs x 2 more weeks.  Start vitamins as discussed.  Continue PPI - Omeprazole 40mg daily RX escribed.  Continue to avoid ASA/NSAIDS/tobacco x 6 weeks postop, steroids x 8 weeks postop.  Call w/ problems/concerns.    The patient was instructed to follow up in 3 weeks, sooner if needed.

## 2024-07-17 ENCOUNTER — READMISSION MANAGEMENT (OUTPATIENT)
Dept: CALL CENTER | Facility: HOSPITAL | Age: 44
End: 2024-07-17
Payer: MEDICAID

## 2024-07-17 NOTE — OUTREACH NOTE
General Surgery Week 1 Survey      Flowsheet Row Responses   [facility], operated by [facility] patient discharged from? [facility]   Does the patient have one of the following disease processes/diagnoses(primary or secondary)? General Surgery   Week 1 attempt successful? Yes  [Pt called back]   Call start time 1350   Unsuccessful attempts Attempt 1   Call end time 1352   Discharge diagnosis GASTRIC SLEEVE LAPAROSCOPIC WITH DAVINCI ROBOT AND ESOPHAGOGASTRODUODENOSCOPY   Meds reviewed with patient/caregiver? Yes   Is the patient taking all medications as directed (includes completed medication regime)? Yes   Does the patient have a follow up appointment scheduled with their surgeon? Yes   Has the patient kept scheduled appointments due by today? Yes   Has home health visited the patient within 72 hours of discharge? N/A   Psychosocial issues? No   Did the patient receive a copy of their discharge instructions? Yes   Nursing interventions Reviewed instructions with patient   What is the patient's perception of their health status since discharge? Improving   Nursing interventions Nurse provided patient education   Is the patient /caregiver able to teach back basic post-op care? Lifting as instructed by MD in discharge instructions, Take showers only when approved by MD-sponge bathe until then, No tub bath, swimming, or hot tub until instructed by MD   Is the patient/caregiver able to teach back signs and symptoms of incisional infection? Increased redness, swelling or pain at the incisonal site, Increased drainage or bleeding, Incisional warmth, Pus or odor from incision, Fever  [has some bruising]   Is the patient/caregiver able to teach back steps to recovery at home? Set small, achievable goals for return to baseline health   Is the patient/caregiver able to teach back the hierarchy of who to call/visit for symptoms/problems? PCP, Specialist, Home health nurse, Urgent Care, ED, 911 Yes   Week 1 call completed? Yes   Graduated Yes    Graduated/Revoked comments Pt reports she is doing better. She went to FU appt. She is monitoring site for any issues.   Call end time 1352            ANDIE RIVERS - Registered Nurse

## 2024-08-14 ENCOUNTER — OFFICE VISIT (OUTPATIENT)
Dept: BARIATRICS/WEIGHT MGMT | Facility: CLINIC | Age: 44
End: 2024-08-14
Payer: MEDICAID

## 2024-08-14 VITALS
DIASTOLIC BLOOD PRESSURE: 84 MMHG | HEART RATE: 80 BPM | HEIGHT: 62 IN | SYSTOLIC BLOOD PRESSURE: 120 MMHG | RESPIRATION RATE: 20 BRPM | BODY MASS INDEX: 40.19 KG/M2 | WEIGHT: 218.4 LBS | OXYGEN SATURATION: 98 % | TEMPERATURE: 92.8 F

## 2024-08-14 DIAGNOSIS — K91.2 POSTGASTRECTOMY MALABSORPTION: ICD-10-CM

## 2024-08-14 DIAGNOSIS — R53.83 FATIGUE, UNSPECIFIED TYPE: ICD-10-CM

## 2024-08-14 DIAGNOSIS — E66.9 OBESITY, CLASS II, BMI 35-39.9: Primary | ICD-10-CM

## 2024-08-14 DIAGNOSIS — Z13.0 SCREENING, IRON DEFICIENCY ANEMIA: ICD-10-CM

## 2024-08-14 DIAGNOSIS — Z51.81 THERAPEUTIC DRUG MONITORING: ICD-10-CM

## 2024-08-14 DIAGNOSIS — Z90.3 POSTGASTRECTOMY MALABSORPTION: ICD-10-CM

## 2024-08-14 DIAGNOSIS — E55.9 HYPOVITAMINOSIS D: ICD-10-CM

## 2024-08-14 DIAGNOSIS — Z13.21 MALNUTRITION SCREEN: ICD-10-CM

## 2024-08-14 PROCEDURE — 99024 POSTOP FOLLOW-UP VISIT: CPT | Performed by: SURGERY

## 2024-08-14 PROCEDURE — 1160F RVW MEDS BY RX/DR IN RCRD: CPT | Performed by: SURGERY

## 2024-08-14 PROCEDURE — 1159F MED LIST DOCD IN RCRD: CPT | Performed by: SURGERY

## 2024-08-14 RX ORDER — MAGNESIUM 200 MG
TABLET ORAL
COMMUNITY
Start: 2024-07-19

## 2024-08-14 RX ORDER — BUTALBITAL, ACETAMINOPHEN AND CAFFEINE 50; 325; 40 MG/1; MG/1; MG/1
1 TABLET ORAL EVERY 4 HOURS PRN
COMMUNITY

## 2024-08-14 NOTE — PROGRESS NOTES
[facility] Bariatric Surgery  [address]  [address]  [facility] [id number] [phone]      Patient Name:  Merle Muir.  :  1980      Date of Visit: 2024      Reason for Visit:  1 month postop    HPI:  Merle Muir is a 44 y.o. female s/p robotic LSG 24 w/ Dr. Cerna    Very pleased she's off all diabetes meds.    Doing well.  No major issues/concerns. Denies dysphagia, reflux, nausea, vomiting, and abdominal pain.  Tolerating diet progression - on stage 5.  Getting  g prot/day.  Drinking 64 fluid oz/day.  Taking MVI, B12, B1, Vit D, iron, and Vit C.  On Omeprazole .  Still holding ASA , NSAIDs , and Steroids.      She has BaritastMiaopai maia and uses it.       Presurgery weight:  249 pounds. Today's weight is 99.1 kg (218 lb 6.4 oz) pounds, today's Body mass index is 39.95 kg/m²., and   weight loss since surgery is 31 pounds.       Past Medical History:   Diagnosis Date    Anxiety and depression     Asthma     Back pain     Naproxen and Flexeril; hx of steroid injections    Chest pain     Edentulous     Epilepsy     Keppra; has neurotransmiter in place. Dr. Deleon    Fatty liver     Fibromyalgia     GERD (gastroesophageal reflux disease)     Omeprazole; EGD in ; hx of h pylori    History of Helicobacter pylori infection     s/p treatment, HPSA (-) 2024    History of renal insufficiency     History of suicide attempt     at age 18 and 20    HLD (hyperlipidemia)     Lactose intolerance     Migraine     Nausea     chronic    BALAJI on CPAP     PONV (postoperative nausea and vomiting)     PTSD (post-traumatic stress disorder)     Right bundle branch block     Type 2 diabetes mellitus     dx in     Urinary incontinence     s/p bladder stimulator    Wears contact lenses     Wears eyeglasses      Past Surgical History:   Procedure Laterality Date    BLADDER SURGERY      stimulator     SECTION      CRANIAL NEUROSTIMULATOR  INSERTION/REPLACEMENT      battery replaced in 2023    DIAGNOSTIC LAPAROSCOPY  2011    ovarian cysts    ENDOSCOPY AND COLONOSCOPY  2022    GASTRIC SLEEVE LAPAROSCOPIC N/A 07/09/2024    Procedure: GASTRIC SLEEVE LAPAROSCOPIC WITH DAVINCI ROBOT AND ESOPHAGOGASTRODUODENOSCOPY;  Surgeon: Kelsey Cerna MD;  Location: [facility];  Service: Robotics - DaVinci;  Laterality: N/A;    LAPAROSCOPIC CHOLECYSTECTOMY  2008    gallstones    NASAL SINUS SURGERY      TEETH EXTRACTION       Outpatient Medications Marked as Taking for the 8/14/24 encounter (Office Visit) with Kelsey Cerna MD   Medication Sig Dispense Refill    Acetaminophen 500 MG capsule Take 2 capsules by mouth Every 4 (Four) Hours As Needed for Moderate Pain or Fever.      albuterol sulfate  (90 Base) MCG/ACT inhaler Inhale 2 puffs Every 6 (Six) Hours As Needed.      Cholecalciferol (Vitamin D3) 125 MCG (5000 UT) tablet dispersible Take 5,000 Units by mouth Daily.      Cyanocobalamin (Vitamin B-12) 1000 MCG sublingual tablet Place 1 tablet every day by sublingual route for 90 days.      fluticasone (FLONASE) 50 MCG/ACT nasal spray 2 sprays into the nostril(s) as directed by provider Daily.      glipizide (GLUCOTROL XL) 5 MG ER tablet Take 1 tablet by mouth 2 (Two) Times a Day.      hydrOXYzine pamoate (VISTARIL) 25 MG capsule Take 1 capsule by mouth 3 (Three) Times a Day As Needed.      levETIRAcetam (KEPPRA) 750 MG tablet Take 1 tablet by mouth 2 (Two) Times a Day.      Melatonin 5 MG capsule Take 1-2 each by mouth Every Night. 1-2 tabs as needed for sleep      methocarbamol (ROBAXIN) 500 MG tablet Take 1 tablet by mouth As Needed.      mometasone-formoterol (DULERA 100) 100-5 MCG/ACT inhaler Inhale 2 puffs 2 (Two) Times a Day.      omeprazole (priLOSEC) 40 MG capsule Take 1 capsule by mouth Daily. 60 capsule 0    ondansetron ODT (ZOFRAN-ODT) 4 MG disintegrating tablet Place 1 tablet on the tongue Every 4 (Four) Hours As Needed for Nausea  "or Vomiting. 10 tablet 0    OneTouch Ultra test strip       prazosin (MINIPRESS) 5 MG capsule Take 1 capsule by mouth Every Night.      pregabalin (LYRICA) 50 MG capsule       Vraylar 3 MG capsule capsule        Allergies   Allergen Reactions    Minocycline Other (See Comments)     Renal failure    Rifampin Other (See Comments)     Renal failure    Sudafed [Pseudoephedrine Hcl] Other (See Comments)     Pt states it shuts her body down    Asa [Aspirin] Other (See Comments)     Unknown    Lactose Intolerance (Gi) GI Intolerance       Social History     Socioeconomic History    Marital status: Single   Tobacco Use    Smoking status: Never     Passive exposure: Never    Smokeless tobacco: Never   Vaping Use    Vaping status: Never Used   Substance and Sexual Activity    Alcohol use: Yes     Comment: once a year    Drug use: Never    Sexual activity: Yes     Partners: Male     Birth control/protection: None       /84 (BP Location: Left arm, Patient Position: Sitting)   Pulse 80   Temp 92.8 °F (33.8 °C)   Resp 20   Ht 157.5 cm (62\")   Wt 99.1 kg (218 lb 6.4 oz)   SpO2 98%   BMI 39.95 kg/m²     Physical Exam  Constitutional:       General: She is not in acute distress.     Appearance: She is well-developed. She is not diaphoretic.   HENT:      Head: Normocephalic and atraumatic.      Mouth/Throat:      Pharynx: No oropharyngeal exudate.   Eyes:      Conjunctiva/sclera: Conjunctivae normal.      Pupils: Pupils are equal, round, and reactive to light.   Pulmonary:      Effort: Pulmonary effort is normal. No respiratory distress.   Abdominal:      General: There is no distension.      Palpations: Abdomen is soft.   Skin:     General: Skin is warm and dry.      Coloration: Skin is not pale.   Neurological:      Mental Status: She is alert and oriented to person, place, and time.      Cranial Nerves: No cranial nerve deficit.   Psychiatric:         Behavior: Behavior normal.         Thought Content: Thought content " normal.           Assessment:  1 month s/p robotic LSG 7/9/24 w/ Dr. Cerna             ICD-10-CM ICD-9-CM   1. Obesity, Class II, BMI 35-39.9  E66.9 278.00   2. Fatigue, unspecified type  R53.83 780.79   3. Hypovitaminosis D  E55.9 268.9   4. Malnutrition screen  Z13.21 V77.2   5. Therapeutic drug monitoring  Z51.81 V58.83   6. Screening, iron deficiency anemia  Z13.0 V78.0   7. Postgastrectomy malabsorption  K91.2 579.3    Z90.3        Plan:  Doing well.  Continue to advance diet per manual.  Continue protein >70g/day.  Encouraged good food choices - high protein, low carb.  Continue routine exercise.  Routine bariatric labs ordered.  Continue vitamins w/ adjustments pending lab results.  Call w/ problems/concerns.    The patient was instructed to follow up in 2 months, sooner if needed.     Kelsey Cerna MD    Answers submitted by the patient for this visit:  Other (Submitted on 8/12/2024)  Please describe your symptoms.: This is a follow-up from my surgery I'm okay right now I think for the most part I think only problems I have all discuss tomorrow at the appointment  Have you had these symptoms before?: Yes  How long have you been having these symptoms?: Greater than 2 weeks  Primary Reason for Visit (Submitted on 8/12/2024)  What is the primary reason for your visit?: Other

## 2024-08-22 LAB
25(OH)D3+25(OH)D2 SERPL-MCNC: 54.7 NG/ML (ref 30–100)
ALBUMIN SERPL-MCNC: 4.5 G/DL (ref 3.9–4.9)
ALP SERPL-CCNC: 117 IU/L (ref 44–121)
ALT SERPL-CCNC: 36 IU/L (ref 0–32)
AST SERPL-CCNC: 21 IU/L (ref 0–40)
BASOPHILS # BLD AUTO: 0 X10E3/UL (ref 0–0.2)
BASOPHILS NFR BLD AUTO: 0 %
BILIRUB SERPL-MCNC: 0.4 MG/DL (ref 0–1.2)
BUN SERPL-MCNC: 12 MG/DL (ref 6–24)
BUN/CREAT SERPL: 19 (ref 9–23)
CALCIUM SERPL-MCNC: 10.2 MG/DL (ref 8.7–10.2)
CHLORIDE SERPL-SCNC: 104 MMOL/L (ref 96–106)
CO2 SERPL-SCNC: 22 MMOL/L (ref 20–29)
CREAT SERPL-MCNC: 0.64 MG/DL (ref 0.57–1)
EGFRCR SERPLBLD CKD-EPI 2021: 113 ML/MIN/1.73
EOSINOPHIL # BLD AUTO: 0.1 X10E3/UL (ref 0–0.4)
EOSINOPHIL NFR BLD AUTO: 2 %
ERYTHROCYTE [DISTWIDTH] IN BLOOD BY AUTOMATED COUNT: 15.2 % (ref 11.7–15.4)
FERRITIN SERPL-MCNC: 253 NG/ML (ref 15–150)
FOLATE SERPL-MCNC: 14 NG/ML
GLOBULIN SER CALC-MCNC: 3.2 G/DL (ref 1.5–4.5)
GLUCOSE SERPL-MCNC: 100 MG/DL (ref 70–99)
HCT VFR BLD AUTO: 45 % (ref 34–46.6)
HGB BLD-MCNC: 14.1 G/DL (ref 11.1–15.9)
IMM GRANULOCYTES # BLD AUTO: 0 X10E3/UL (ref 0–0.1)
IMM GRANULOCYTES NFR BLD AUTO: 0 %
IRON SERPL-MCNC: 46 UG/DL (ref 27–159)
LYMPHOCYTES # BLD AUTO: 2.3 X10E3/UL (ref 0.7–3.1)
LYMPHOCYTES NFR BLD AUTO: 39 %
MCH RBC QN AUTO: 26.5 PG (ref 26.6–33)
MCHC RBC AUTO-ENTMCNC: 31.3 G/DL (ref 31.5–35.7)
MCV RBC AUTO: 85 FL (ref 79–97)
METHYLMALONATE SERPL-SCNC: 176 NMOL/L (ref 0–378)
MONOCYTES # BLD AUTO: 0.5 X10E3/UL (ref 0.1–0.9)
MONOCYTES NFR BLD AUTO: 9 %
NEUTROPHILS # BLD AUTO: 3 X10E3/UL (ref 1.4–7)
NEUTROPHILS NFR BLD AUTO: 50 %
PLATELET # BLD AUTO: 294 X10E3/UL (ref 150–450)
POTASSIUM SERPL-SCNC: 4.2 MMOL/L (ref 3.5–5.2)
PREALB SERPL-MCNC: 25 MG/DL (ref 12–34)
PROT SERPL-MCNC: 7.7 G/DL (ref 6–8.5)
RBC # BLD AUTO: 5.32 X10E6/UL (ref 3.77–5.28)
SODIUM SERPL-SCNC: 143 MMOL/L (ref 134–144)
VIT B1 BLD-SCNC: 134.4 NMOL/L (ref 66.5–200)
WBC # BLD AUTO: 6 X10E3/UL (ref 3.4–10.8)

## 2024-11-26 ENCOUNTER — TELEMEDICINE (OUTPATIENT)
Dept: BARIATRICS/WEIGHT MGMT | Facility: CLINIC | Age: 44
End: 2024-11-26
Payer: MEDICAID

## 2024-11-26 VITALS — BODY MASS INDEX: 37.17 KG/M2 | HEIGHT: 62 IN | WEIGHT: 202 LBS

## 2024-11-26 DIAGNOSIS — E55.9 VITAMIN D DEFICIENCY: ICD-10-CM

## 2024-11-26 DIAGNOSIS — Z90.3 POSTGASTRECTOMY MALABSORPTION: ICD-10-CM

## 2024-11-26 DIAGNOSIS — R53.83 FATIGUE, UNSPECIFIED TYPE: ICD-10-CM

## 2024-11-26 DIAGNOSIS — R79.0 ABNORMAL BLOOD LEVEL OF IRON: ICD-10-CM

## 2024-11-26 DIAGNOSIS — R10.13 DYSPEPSIA: ICD-10-CM

## 2024-11-26 DIAGNOSIS — E66.812 OBESITY, CLASS II, BMI 35-39.9: Primary | ICD-10-CM

## 2024-11-26 DIAGNOSIS — K91.2 POSTGASTRECTOMY MALABSORPTION: ICD-10-CM

## 2024-11-26 DIAGNOSIS — E11.69 TYPE 2 DIABETES MELLITUS WITH OTHER SPECIFIED COMPLICATION, WITHOUT LONG-TERM CURRENT USE OF INSULIN: ICD-10-CM

## 2024-11-26 PROCEDURE — 1159F MED LIST DOCD IN RCRD: CPT | Performed by: PHYSICIAN ASSISTANT

## 2024-11-26 PROCEDURE — 99214 OFFICE O/P EST MOD 30 MIN: CPT | Performed by: PHYSICIAN ASSISTANT

## 2024-11-26 PROCEDURE — 1160F RVW MEDS BY RX/DR IN RCRD: CPT | Performed by: PHYSICIAN ASSISTANT

## 2024-11-26 RX ORDER — BUPROPION HYDROCHLORIDE 150 MG/1
150 TABLET ORAL
COMMUNITY
Start: 2024-11-21 | End: 2025-02-19

## 2024-11-26 NOTE — PROGRESS NOTES
[facility] Bariatric Surgery  [address]  [address]  [facility] [id number] [phone]      Patient Name:  Merle Muir  :  1980      Date of Visit: 2024        Reason for Visit:  almost 5 months postop      HPI:  Merle Muir is a 44 y.o. female s/p robotic LSG 24 w/ Dr. Cerna    Feeling discouraged.  Working w/ therapist for mood issues, recently started on Wellbutrin.  Eating 3 meals/day w/ 1 snack nightly.  Strives for 100g prot/day.  Drinking SF flavored fox.  Denies dysphagia/N/V/AP.  Only has issues if she eats too much.  Continues on daily PPI.  Walking for exercise, has not been able to afford a gym.    Labs 24 - high ferritin, o/w okay.  Taking MVI, Vit D, B12, iron.      Off all diabetic meds.      Presurgery weight: 249 pounds.  Today's weight is 91.6 kg (202 lb) pounds, today's  Body mass index is 36.95 kg/m²., and weight loss since surgery is 47 pounds.           Past Medical History:   Diagnosis Date    Anxiety and depression     Asthma     Back pain     Naproxen and Flexeril; hx of steroid injections    Chest pain     Edentulous     Epilepsy     Keppra; has neurotransmiter in place. Dr. Deleon    Fatty liver     Fibromyalgia     GERD (gastroesophageal reflux disease)     Omeprazole; EGD in ; hx of h pylori    History of Helicobacter pylori infection     s/p treatment, HPSA (-) 2024    History of renal insufficiency     History of suicide attempt     at age 18 and 20    HLD (hyperlipidemia)     Lactose intolerance     Migraine     Nausea     chronic    BALAJI on CPAP     PONV (postoperative nausea and vomiting)     PTSD (post-traumatic stress disorder)     Right bundle branch block     Type 2 diabetes mellitus     dx in     Urinary incontinence     s/p bladder stimulator    Wears contact lenses     Wears eyeglasses      Past Surgical History:   Procedure Laterality Date    BLADDER SURGERY      stimulator      SECTION      CRANIAL NEUROSTIMULATOR INSERTION/REPLACEMENT      battery replaced in     DIAGNOSTIC LAPAROSCOPY      ovarian cysts    ENDOSCOPY AND COLONOSCOPY      GASTRIC SLEEVE LAPAROSCOPIC N/A 2024    Procedure: GASTRIC SLEEVE LAPAROSCOPIC WITH DAVINCI ROBOT AND ESOPHAGOGASTRODUODENOSCOPY;  Surgeon: Kelsey Cerna MD;  Location: [facility];  Service: Robotics - DaVinci;  Laterality: N/A;    LAPAROSCOPIC CHOLECYSTECTOMY      gallstones    NASAL SINUS SURGERY      TEETH EXTRACTION       Outpatient Medications Marked as Taking for the 24 encounter (Telemedicine) with Laura Olvera PA   Medication Sig Dispense Refill    buPROPion XL (WELLBUTRIN XL) 150 MG 24 hr tablet Take 1 tablet by mouth.      fluticasone (FLONASE) 50 MCG/ACT nasal spray Administer 2 sprays into the nostril(s) as directed by provider Daily.      hydrOXYzine pamoate (VISTARIL) 25 MG capsule Take 1 capsule by mouth 3 (Three) Times a Day As Needed.      levETIRAcetam (KEPPRA) 750 MG tablet Take 1 tablet by mouth 2 (Two) Times a Day.      Melatonin 5 MG capsule Take 1-2 each by mouth Every Night. 1-2 tabs as needed for sleep      mometasone-formoterol (DULERA 100) 100-5 MCG/ACT inhaler Inhale 2 puffs 2 (Two) Times a Day.      omeprazole (priLOSEC) 40 MG capsule Take 1 capsule by mouth Daily. 60 capsule 0    prazosin (MINIPRESS) 5 MG capsule Take 1 capsule by mouth Every Night.      pregabalin (LYRICA) 50 MG capsule       Acetaminophen 500 MG capsule Take 2 capsules by mouth Every 4 (Four) Hours As Needed for Moderate Pain or Fever.      albuterol sulfate  (90 Base) MCG/ACT inhaler Inhale 2 puffs Every 6 (Six) Hours As Needed.      butalbital-acetaminophen-caffeine (FIORICET, ESGIC) -40 MG per tablet Take 1 tablet by mouth Every 4 (Four) Hours As Needed for Headache.      Cholecalciferol (Vitamin D3) 125 MCG (5000 UT) tablet dispersible Take 5,000 Units by mouth Daily.       "Cyanocobalamin (Vitamin B-12) 1000 MCG sublingual tablet Place 1 tablet every day by sublingual route for 90 days.       Allergies   Allergen Reactions    Minocycline Other (See Comments)     Renal failure    Rifampin Other (See Comments)     Renal failure    Sudafed [Pseudoephedrine Hcl] Other (See Comments)     Pt states it shuts her body down    Asa [Aspirin] Other (See Comments)     Unknown    Lactose Intolerance (Gi) GI Intolerance       Social History     Socioeconomic History    Marital status: Single   Tobacco Use    Smoking status: Never     Passive exposure: Never    Smokeless tobacco: Never   Vaping Use    Vaping status: Never Used   Substance and Sexual Activity    Alcohol use: Yes     Comment: once a year    Drug use: Never    Sexual activity: Yes     Partners: Male     Birth control/protection: None     Social History     Social History Narrative    Lives in [place], [place]  with 1 child and is currently disabled.        Ht 157.5 cm (62\")   Wt 91.6 kg (202 lb)   BMI 36.95 kg/m²     Physical Exam  Constitutional:       General: She is not in acute distress.     Appearance: She is well-developed.   Eyes:      General: No scleral icterus.  Pulmonary:      Effort: Pulmonary effort is normal.   Neurological:      Mental Status: She is alert and oriented to person, place, and time.           Assessment:   almost 5 months s/p robotic LSG 7/9/24 w/ Dr. Cerna      ICD-10-CM ICD-9-CM   1. Obesity, Class II, BMI 35-39.9  E66.812 278.00   2. Postgastrectomy malabsorption  K91.2 579.3    Z90.3    3. Type 2 diabetes mellitus with other specified complication, without long-term current use of insulin  E11.69 250.80   4. Dyspepsia  R10.13 536.8   5. Fatigue, unspecified type  R53.83 780.79   6. Vitamin D deficiency  E55.9 268.9   7. Abnormal blood level of iron  R79.0 790.6         Class 2 Severe Obesity (BMI >=35 and <=39.9). Obesity-related health conditions include the following:  see above . Obesity " is improving with treatment. BMI is is above average; BMI management plan is completed. We discussed  see plan .       Plan:  Continue protein 100g/day.  Increase routine exercise/activity, add strength training.  Bariatric labs ordered.  Continue vitamins w/ adjustments pending lab results.  Call w/ problems/concerns.    The patient was instructed to follow up in 2-3 months, sooner if needed.       Note: This was an audio and video enabled telemedicine encounter conducted via [employer].  Patient is located in [place].  Provider is at the office. Consent was obtained prior to the visit.       I spent 30 minutes on this patient encounter, which includes chart review/documentation, evaluation & management, patient education and counseling r/t healthy habits and necessary dietary/lifestyle modifications for continued success/weight loss.

## 2024-12-09 ENCOUNTER — LAB (OUTPATIENT)
Dept: LAB | Facility: HOSPITAL | Age: 44
End: 2024-12-09
Payer: MEDICAID

## 2024-12-09 DIAGNOSIS — K91.2 POSTGASTRECTOMY MALABSORPTION: ICD-10-CM

## 2024-12-09 DIAGNOSIS — E66.812 OBESITY, CLASS II, BMI 35-39.9: ICD-10-CM

## 2024-12-09 DIAGNOSIS — E55.9 VITAMIN D DEFICIENCY: ICD-10-CM

## 2024-12-09 DIAGNOSIS — R79.0 ABNORMAL BLOOD LEVEL OF IRON: ICD-10-CM

## 2024-12-09 DIAGNOSIS — E11.69 TYPE 2 DIABETES MELLITUS WITH OTHER SPECIFIED COMPLICATION, WITHOUT LONG-TERM CURRENT USE OF INSULIN: ICD-10-CM

## 2024-12-09 DIAGNOSIS — R53.83 FATIGUE, UNSPECIFIED TYPE: ICD-10-CM

## 2024-12-09 DIAGNOSIS — R10.13 DYSPEPSIA: ICD-10-CM

## 2024-12-09 DIAGNOSIS — Z90.3 POSTGASTRECTOMY MALABSORPTION: ICD-10-CM

## 2024-12-09 LAB
25(OH)D3 SERPL-MCNC: 26.9 NG/ML (ref 30–100)
ALBUMIN SERPL-MCNC: 3.6 G/DL (ref 3.5–5.2)
ALBUMIN/GLOB SERPL: 0.9 G/DL
ALP SERPL-CCNC: 114 U/L (ref 39–117)
ALT SERPL W P-5'-P-CCNC: 16 U/L (ref 1–33)
ANION GAP SERPL CALCULATED.3IONS-SCNC: 10.2 MMOL/L (ref 5–15)
AST SERPL-CCNC: 16 U/L (ref 1–32)
BASOPHILS # BLD AUTO: 0.03 10*3/MM3 (ref 0–0.2)
BASOPHILS NFR BLD AUTO: 0.5 % (ref 0–1.5)
BILIRUB SERPL-MCNC: 0.2 MG/DL (ref 0–1.2)
BUN SERPL-MCNC: 12 MG/DL (ref 6–20)
BUN/CREAT SERPL: 17.9 (ref 7–25)
CALCIUM SPEC-SCNC: 9.2 MG/DL (ref 8.6–10.5)
CHLORIDE SERPL-SCNC: 105 MMOL/L (ref 98–107)
CO2 SERPL-SCNC: 24.8 MMOL/L (ref 22–29)
CREAT SERPL-MCNC: 0.67 MG/DL (ref 0.57–1)
DEPRECATED RDW RBC AUTO: 38.8 FL (ref 37–54)
EGFRCR SERPLBLD CKD-EPI 2021: 112.1 ML/MIN/1.73
EOSINOPHIL # BLD AUTO: 0.09 10*3/MM3 (ref 0–0.4)
EOSINOPHIL NFR BLD AUTO: 1.4 % (ref 0.3–6.2)
ERYTHROCYTE [DISTWIDTH] IN BLOOD BY AUTOMATED COUNT: 13.5 % (ref 12.3–15.4)
FERRITIN SERPL-MCNC: 141 NG/ML (ref 13–150)
FOLATE SERPL-MCNC: 6.84 NG/ML (ref 4.78–24.2)
GLOBULIN UR ELPH-MCNC: 3.8 GM/DL
GLUCOSE SERPL-MCNC: 83 MG/DL (ref 65–99)
HCT VFR BLD AUTO: 41 % (ref 34–46.6)
HGB BLD-MCNC: 13.8 G/DL (ref 12–15.9)
IMM GRANULOCYTES # BLD AUTO: 0.02 10*3/MM3 (ref 0–0.05)
IMM GRANULOCYTES NFR BLD AUTO: 0.3 % (ref 0–0.5)
IRON 24H UR-MRATE: 34 MCG/DL (ref 37–145)
LYMPHOCYTES # BLD AUTO: 2.34 10*3/MM3 (ref 0.7–3.1)
LYMPHOCYTES NFR BLD AUTO: 35.6 % (ref 19.6–45.3)
MCH RBC QN AUTO: 27.3 PG (ref 26.6–33)
MCHC RBC AUTO-ENTMCNC: 33.7 G/DL (ref 31.5–35.7)
MCV RBC AUTO: 81 FL (ref 79–97)
MONOCYTES # BLD AUTO: 0.55 10*3/MM3 (ref 0.1–0.9)
MONOCYTES NFR BLD AUTO: 8.4 % (ref 5–12)
NEUTROPHILS NFR BLD AUTO: 3.54 10*3/MM3 (ref 1.7–7)
NEUTROPHILS NFR BLD AUTO: 53.8 % (ref 42.7–76)
NRBC BLD AUTO-RTO: 0 /100 WBC (ref 0–0.2)
PLATELET # BLD AUTO: 312 10*3/MM3 (ref 140–450)
PMV BLD AUTO: 11.8 FL (ref 6–12)
POTASSIUM SERPL-SCNC: 3.8 MMOL/L (ref 3.5–5.2)
PREALB SERPL-MCNC: 19.9 MG/DL (ref 20–40)
PROT SERPL-MCNC: 7.4 G/DL (ref 6–8.5)
RBC # BLD AUTO: 5.06 10*6/MM3 (ref 3.77–5.28)
SODIUM SERPL-SCNC: 140 MMOL/L (ref 136–145)
WBC NRBC COR # BLD AUTO: 6.57 10*3/MM3 (ref 3.4–10.8)

## 2024-12-09 PROCEDURE — 80053 COMPREHEN METABOLIC PANEL: CPT

## 2024-12-09 PROCEDURE — 84134 ASSAY OF PREALBUMIN: CPT

## 2024-12-09 PROCEDURE — 83540 ASSAY OF IRON: CPT

## 2024-12-09 PROCEDURE — 84425 ASSAY OF VITAMIN B-1: CPT

## 2024-12-09 PROCEDURE — 85025 COMPLETE CBC W/AUTO DIFF WBC: CPT

## 2024-12-09 PROCEDURE — 36415 COLL VENOUS BLD VENIPUNCTURE: CPT

## 2024-12-09 PROCEDURE — 82728 ASSAY OF FERRITIN: CPT

## 2024-12-09 PROCEDURE — 83921 ORGANIC ACID SINGLE QUANT: CPT

## 2024-12-09 PROCEDURE — 82746 ASSAY OF FOLIC ACID SERUM: CPT

## 2024-12-09 PROCEDURE — 82306 VITAMIN D 25 HYDROXY: CPT

## 2024-12-12 LAB — VIT B1 BLD-SCNC: 121 NMOL/L (ref 66.5–200)

## 2024-12-13 LAB — METHYLMALONATE SERPL-SCNC: 126 NMOL/L (ref 0–378)

## 2025-03-10 ENCOUNTER — OFFICE VISIT (OUTPATIENT)
Dept: BARIATRICS/WEIGHT MGMT | Facility: CLINIC | Age: 45
End: 2025-03-10
Payer: MEDICAID

## 2025-03-10 ENCOUNTER — DOCUMENTATION (OUTPATIENT)
Dept: BARIATRICS/WEIGHT MGMT | Facility: CLINIC | Age: 45
End: 2025-03-10
Payer: MEDICAID

## 2025-03-10 VITALS
BODY MASS INDEX: 37.5 KG/M2 | SYSTOLIC BLOOD PRESSURE: 132 MMHG | RESPIRATION RATE: 16 BRPM | TEMPERATURE: 97.3 F | WEIGHT: 203.8 LBS | OXYGEN SATURATION: 98 % | DIASTOLIC BLOOD PRESSURE: 86 MMHG | HEART RATE: 79 BPM | HEIGHT: 62 IN

## 2025-03-10 DIAGNOSIS — K91.2 POSTGASTRECTOMY MALABSORPTION: ICD-10-CM

## 2025-03-10 DIAGNOSIS — R79.0 ABNORMAL BLOOD LEVEL OF IRON: ICD-10-CM

## 2025-03-10 DIAGNOSIS — R53.83 FATIGUE, UNSPECIFIED TYPE: ICD-10-CM

## 2025-03-10 DIAGNOSIS — E66.812 OBESITY, CLASS II, BMI 35-39.9: Primary | ICD-10-CM

## 2025-03-10 DIAGNOSIS — Z90.3 POSTGASTRECTOMY MALABSORPTION: ICD-10-CM

## 2025-03-10 DIAGNOSIS — E55.9 VITAMIN D DEFICIENCY: ICD-10-CM

## 2025-03-10 PROCEDURE — 1160F RVW MEDS BY RX/DR IN RCRD: CPT | Performed by: NURSE PRACTITIONER

## 2025-03-10 PROCEDURE — 99214 OFFICE O/P EST MOD 30 MIN: CPT | Performed by: NURSE PRACTITIONER

## 2025-03-10 PROCEDURE — 1159F MED LIST DOCD IN RCRD: CPT | Performed by: NURSE PRACTITIONER

## 2025-03-10 NOTE — PROGRESS NOTES
Bariatric Nutrition Consult     Name: Merle Muir   YOB: 1980   Age: 44 y.o.   MRN: [id number]    Consult Date:3/10/25    Surgery:           sleeve                        Date of surgery:7/9/24    Patient is 8 months post op.     Height: 157.5cm             Weight:  203lbs     Pre Op weight: 249lbs                Current BMI: 37.28    Weight change:  lost 46lbs, lost 31lbs of this in the first month post op    Diet history reveals:  Pt is edentulous. Pt reintroduced bread and pasta last month. Pt tracks intake in baritastic and reports over 200g carbs daily and 2000cals. Pt eats fast food daily. Pt eats a box of cheezits at one sitting, eating when depressed and at night. Diet is too high in carbs and fat and calories.     Pt reports no memory so info provided in detail in writing  Pt reports hair loss, wears a wig    Breakfast: 2-4sl buttered toast with 2-4 eggs/ sausage eggs potato gravy casserole    Lunch: capn D tilapia(not breaded)green beans / [facility] chicken breast and though (removes batter) and mashed potatoes    Dinner: homemade tuna patties/homemade chicken noodle soup/chicken casserole    Snacks: box of cheezits, built protein bar              Drinks:  sugar free flavor in water, coffee with sf creamer, unsweet tea    Exercise/activity:  busy throughout the day, has resistance bands and a mini trampoline but does not use them    Main bariatric nutrition principles discussed and explained and queries answered. Encouraged patient to focus on       Reduce cals to 1400, 100g protein daily, less than 100g carbs daily  Eat protein first, add in fiber (veg and salad) and eat carbs last  Do not have carbs at every meal  Reduce carb portions  Reduce intake of fast food  Omit cheezits (trigger food)  Plan one snack in the evening  Add in resistance bands and trampoline activity at home  Written info provided          Olesya Tran RD, LD

## 2025-03-10 NOTE — PROGRESS NOTES
[facility]  [address]  [address]  [facility] [id number] [phone]      Patient Name:  Merle Muir  :  1980      Date of Visit: 03/10/2025      Reason for Visit:  8 months postop      HPI:  Merle Muir is a 44 y.o. female s/p robotic LSG 24 w/ Dr. Cerna    Feeling discouraged w/ weight loss. Having issues w/ depression, which causes her to eat more. Working with therapist.    Denies dysphagia, reflux, nausea, vomiting, abdominal pain, and constipation.  Has issues w/ diarrhea, which predates surgery. On Omeprazole .    Getting 100+g prot/day. Eating 3 meals per day, plus snack after dinner. Tracking intake on Baritastic. Trying to cut down on carbs, sugar. Sabina says 5944-6754+ mason per day. Drinking 64 fluid oz/day.  Cut out soda. Physical activity: walking, staying active.     Labs 24- low vit D, low iron. Taking MVI, Vit D, B12, iron.        Presurgery weight: 249 pounds.  Today's weight is 92.4 kg (203 lb 12.8 oz) pounds, today's  Body mass index is 37.28 kg/m²., and weight loss since surgery is 46 pounds.       Past Medical History:   Diagnosis Date    Anxiety and depression     Asthma     Back pain     Naproxen and Flexeril; hx of steroid injections    Chest pain     Edentulous     Epilepsy     Keppra; has neurotransmiter in place. Dr. Deleon    Fatty liver     Fibromyalgia     GERD (gastroesophageal reflux disease)     Omeprazole; EGD in ; hx of h pylori    History of Helicobacter pylori infection     s/p treatment, HPSA (-) 2024    History of renal insufficiency     History of suicide attempt     at age 18 and 20    HLD (hyperlipidemia)     Lactose intolerance     Migraine     Nausea     chronic    BALAJI on CPAP     PONV (postoperative nausea and vomiting)     PTSD (post-traumatic stress disorder)     Right bundle branch block     Type 2 diabetes mellitus     dx in     Urinary incontinence     s/p bladder  stimulator    Wears contact lenses     Wears eyeglasses      Past Surgical History:   Procedure Laterality Date    BLADDER SURGERY      stimulator     SECTION      CRANIAL NEUROSTIMULATOR INSERTION/REPLACEMENT      battery replaced in     DIAGNOSTIC LAPAROSCOPY      ovarian cysts    ENDOSCOPY AND COLONOSCOPY      GASTRIC SLEEVE LAPAROSCOPIC N/A 2024    Procedure: GASTRIC SLEEVE LAPAROSCOPIC WITH DAVINCI ROBOT AND ESOPHAGOGASTRODUODENOSCOPY;  Surgeon: Kelsey Cerna MD;  Location: [facility];  Service: Robotics - DaVinci;  Laterality: N/A;    LAPAROSCOPIC CHOLECYSTECTOMY      gallstones    NASAL SINUS SURGERY      TEETH EXTRACTION       Outpatient Medications Marked as Taking for the 3/10/25 encounter (Office Visit) with Gaby Muñoz APRN   Medication Sig Dispense Refill    Acetaminophen 500 MG capsule Take 2 capsules by mouth Every 4 (Four) Hours As Needed for Moderate Pain or Fever.      albuterol sulfate  (90 Base) MCG/ACT inhaler Inhale 2 puffs Every 6 (Six) Hours As Needed.      butalbital-acetaminophen-caffeine (FIORICET, ESGIC) -40 MG per tablet Take 1 tablet by mouth Every 4 (Four) Hours As Needed for Headache.      Cholecalciferol (Vitamin D3) 125 MCG (5000 UT) tablet dispersible Take 5,000 Units by mouth Daily.      Cyanocobalamin (Vitamin B-12) 1000 MCG sublingual tablet Place 1 tablet every day by sublingual route for 90 days.      fluticasone (FLONASE) 50 MCG/ACT nasal spray Administer 2 sprays into the nostril(s) as directed by provider Daily.      hydrOXYzine pamoate (VISTARIL) 25 MG capsule Take 1 capsule by mouth 3 (Three) Times a Day As Needed.      levETIRAcetam (KEPPRA) 750 MG tablet Take 1 tablet by mouth 2 (Two) Times a Day.      Melatonin 5 MG capsule Take 1-2 each by mouth Every Night. 1-2 tabs as needed for sleep      mometasone-formoterol (DULERA 100) 100-5 MCG/ACT inhaler Inhale 2 puffs 2 (Two) Times a Day.      omeprazole (priLOSEC)  "40 MG capsule Take 1 capsule by mouth Daily. 60 capsule 0    prazosin (MINIPRESS) 5 MG capsule Take 1 capsule by mouth Every Night.      pregabalin (LYRICA) 50 MG capsule        Allergies   Allergen Reactions    Minocycline Other (See Comments)     Renal failure    Rifampin Other (See Comments)     Renal failure    Sudafed [Pseudoephedrine Hcl] Other (See Comments)     Pt states it shuts her body down    Asa [Aspirin] Other (See Comments)     Unknown    Lactose Intolerance (Gi) GI Intolerance       Social History     Socioeconomic History    Marital status: Single   Tobacco Use    Smoking status: Never     Passive exposure: Never    Smokeless tobacco: Never   Vaping Use    Vaping status: Never Used   Substance and Sexual Activity    Alcohol use: Yes     Comment: once a year    Drug use: Never    Sexual activity: Yes     Partners: Male     Birth control/protection: None     Social History     Social History Narrative    Lives in [place], [place]  with 1 child and is currently disabled.        /86 (BP Location: Left arm, Patient Position: Sitting)   Pulse 79   Temp 97.3 °F (36.3 °C)   Resp 16   Ht 157.5 cm (62\")   Wt 92.4 kg (203 lb 12.8 oz)   SpO2 98%   BMI 37.28 kg/m²     Physical Exam  Constitutional:       General: She is not in acute distress.     Appearance: She is well-developed.   Eyes:      General: No scleral icterus.  Pulmonary:      Effort: Pulmonary effort is normal.   Neurological:      Mental Status: She is alert and oriented to person, place, and time.         Assessment:   8 months s/p robotic LSG 7/9/24 w/ Dr. Cerna    ICD-10-CM ICD-9-CM   1. Obesity, Class II, BMI 35-39.9  E66.812 278.00   2. Postgastrectomy malabsorption  K91.2 579.3    Z90.3    3. Fatigue, unspecified type  R53.83 780.79   4. Vitamin D deficiency  E55.9 268.9   5. Abnormal blood level of iron  R79.0 790.6       Class 2 Severe Obesity (BMI >=35 and <=39.9). Obesity-related health conditions include the " following:  see plan . Obesity is improving with treatment. BMI is is above average; BMI management plan is completed. We discussed  see plan .        Plan:  Follow up w/ dietician. Continue protein 100g/day. Increase routine exercise/activity as able.  Bariatric labs ordered.  Continue vitamins w/ adjustments pending lab results.  Call w/ problems/concerns.    The patient was instructed to follow up in 3 months, sooner if needed.       OFE Sun      I spent 30 minutes caring for Merle on this date of service. This time includes time spent by me in the following activities: preparing for the visit, reviewing tests, obtaining and/or reviewing a separately obtained history, performing a medically appropriate examination and/or evaluation, counseling and educating the patient/family/caregiver, ordering medications, tests, or procedures, and documenting information in the medical record.

## 2025-03-14 LAB
25(OH)D3+25(OH)D2 SERPL-MCNC: 34.3 NG/ML (ref 30–100)
ALBUMIN SERPL-MCNC: 4.4 G/DL (ref 3.9–4.9)
ALP SERPL-CCNC: 131 IU/L (ref 44–121)
ALT SERPL-CCNC: 20 IU/L (ref 0–32)
AST SERPL-CCNC: 17 IU/L (ref 0–40)
BASOPHILS # BLD AUTO: 0.1 X10E3/UL (ref 0–0.2)
BASOPHILS NFR BLD AUTO: 1 %
BILIRUB SERPL-MCNC: 0.3 MG/DL (ref 0–1.2)
BUN SERPL-MCNC: 14 MG/DL (ref 6–24)
BUN/CREAT SERPL: 21 (ref 9–23)
CALCIUM SERPL-MCNC: 10 MG/DL (ref 8.7–10.2)
CHLORIDE SERPL-SCNC: 103 MMOL/L (ref 96–106)
CO2 SERPL-SCNC: 23 MMOL/L (ref 20–29)
CREAT SERPL-MCNC: 0.67 MG/DL (ref 0.57–1)
EGFRCR SERPLBLD CKD-EPI 2021: 112 ML/MIN/1.73
EOSINOPHIL # BLD AUTO: 0.1 X10E3/UL (ref 0–0.4)
EOSINOPHIL NFR BLD AUTO: 1 %
ERYTHROCYTE [DISTWIDTH] IN BLOOD BY AUTOMATED COUNT: 13.2 % (ref 11.7–15.4)
FERRITIN SERPL-MCNC: 129 NG/ML (ref 15–150)
FOLATE SERPL-MCNC: 7.5 NG/ML
GLOBULIN SER CALC-MCNC: 3.3 G/DL (ref 1.5–4.5)
GLUCOSE SERPL-MCNC: 88 MG/DL (ref 70–99)
HCT VFR BLD AUTO: 46.2 % (ref 34–46.6)
HGB BLD-MCNC: 15.4 G/DL (ref 11.1–15.9)
IMM GRANULOCYTES # BLD AUTO: 0 X10E3/UL (ref 0–0.1)
IMM GRANULOCYTES NFR BLD AUTO: 0 %
IRON SERPL-MCNC: 46 UG/DL (ref 27–159)
LYMPHOCYTES # BLD AUTO: 2.5 X10E3/UL (ref 0.7–3.1)
LYMPHOCYTES NFR BLD AUTO: 33 %
MCH RBC QN AUTO: 27.7 PG (ref 26.6–33)
MCHC RBC AUTO-ENTMCNC: 33.3 G/DL (ref 31.5–35.7)
MCV RBC AUTO: 83 FL (ref 79–97)
METHYLMALONATE SERPL-SCNC: 155 NMOL/L (ref 0–378)
MONOCYTES # BLD AUTO: 0.5 X10E3/UL (ref 0.1–0.9)
MONOCYTES NFR BLD AUTO: 6 %
NEUTROPHILS # BLD AUTO: 4.5 X10E3/UL (ref 1.4–7)
NEUTROPHILS NFR BLD AUTO: 59 %
PLATELET # BLD AUTO: 359 X10E3/UL (ref 150–450)
POTASSIUM SERPL-SCNC: 5 MMOL/L (ref 3.5–5.2)
PREALB SERPL-MCNC: 26 MG/DL (ref 12–34)
PROT SERPL-MCNC: 7.7 G/DL (ref 6–8.5)
RBC # BLD AUTO: 5.55 X10E6/UL (ref 3.77–5.28)
SODIUM SERPL-SCNC: 140 MMOL/L (ref 134–144)
VIT B1 BLD-SCNC: 143.7 NMOL/L (ref 66.5–200)
WBC # BLD AUTO: 7.7 X10E3/UL (ref 3.4–10.8)

## 2025-03-17 ENCOUNTER — RESULTS FOLLOW-UP (OUTPATIENT)
Dept: BARIATRICS/WEIGHT MGMT | Facility: CLINIC | Age: 45
End: 2025-03-17
Payer: MEDICAID

## 2025-06-10 ENCOUNTER — OFFICE VISIT (OUTPATIENT)
Dept: BARIATRICS/WEIGHT MGMT | Facility: CLINIC | Age: 45
End: 2025-06-10
Payer: MEDICAID

## 2025-06-10 VITALS
TEMPERATURE: 97.5 F | SYSTOLIC BLOOD PRESSURE: 124 MMHG | WEIGHT: 201.4 LBS | HEIGHT: 62 IN | DIASTOLIC BLOOD PRESSURE: 78 MMHG | OXYGEN SATURATION: 98 % | HEART RATE: 79 BPM | BODY MASS INDEX: 37.06 KG/M2 | RESPIRATION RATE: 17 BRPM

## 2025-06-10 DIAGNOSIS — E55.9 VITAMIN D DEFICIENCY: ICD-10-CM

## 2025-06-10 DIAGNOSIS — Z98.84 S/P BARIATRIC SURGERY: ICD-10-CM

## 2025-06-10 DIAGNOSIS — Z90.3 POSTGASTRECTOMY MALABSORPTION: ICD-10-CM

## 2025-06-10 DIAGNOSIS — R79.0 ABNORMAL BLOOD LEVEL OF IRON: ICD-10-CM

## 2025-06-10 DIAGNOSIS — K91.2 POSTGASTRECTOMY MALABSORPTION: ICD-10-CM

## 2025-06-10 DIAGNOSIS — R53.83 FATIGUE, UNSPECIFIED TYPE: ICD-10-CM

## 2025-06-10 DIAGNOSIS — E66.812 OBESITY, CLASS II, BMI 35-39.9: Primary | ICD-10-CM

## 2025-06-10 PROCEDURE — 1159F MED LIST DOCD IN RCRD: CPT | Performed by: NURSE PRACTITIONER

## 2025-06-10 PROCEDURE — 99214 OFFICE O/P EST MOD 30 MIN: CPT | Performed by: NURSE PRACTITIONER

## 2025-06-10 PROCEDURE — 1160F RVW MEDS BY RX/DR IN RCRD: CPT | Performed by: NURSE PRACTITIONER

## 2025-06-10 NOTE — PROGRESS NOTES
"[facility]  [address]  [address]  [facility] [id number] [phone]      Patient Name:  Merle Muir  :  1980      Date of Visit: 06/10/2025      Reason for Visit:  11 months postop      HPI:  Merle Muir is a 44 y.o. female s/p robotic LSG 24 w/ Dr. Cerna      Frustrated w/ weight loss. Pt is tearful, feels she has drastically adjusted her diet, without any progress. Says she eats only \"meat, veggies and salad\".    Getting 100+g prot/day.  Tracking w/ Baritastic maia. Not drinking any protein shakes. <100g carbs daily. 9992-1943 mason per day. Eating 3 meals, plus 1 snack after dinner per day. Stops eating by 10pm- which is an improvement. Drinking 64 fluid oz/day- zero sugar tea or SF flavoring packets into water.  Physical activity: 5x/wk on bike.    Denies dysphagia, reflux, nausea, vomiting, and abdominal pain. No longer needing PPI.    Labs 3/10/25- acceptable. Taking MVI, B12, B1, Calcium, Vit D, iron, and Vit C.        Presurgery weight: 249 pounds.  Today's weight is 91.4 kg (201 lb 6.4 oz) pounds, today's  Body mass index is 36.84 kg/m²., and weight loss since surgery is 48 pounds.       Past Medical History:   Diagnosis Date    Anxiety and depression     Asthma     Back pain     Naproxen and Flexeril; hx of steroid injections    Chest pain     Edentulous     Epilepsy     Keppra; has neurotransmiter in place. Dr. Deleon    Fatty liver     Fibromyalgia     GERD (gastroesophageal reflux disease)     Omeprazole; EGD in ; hx of h pylori    History of Helicobacter pylori infection     s/p treatment, HPSA (-) 2024    History of renal insufficiency     History of suicide attempt     at age 18 and 20    HLD (hyperlipidemia)     Lactose intolerance     Migraine     Nausea     chronic    BALAJI on CPAP     PONV (postoperative nausea and vomiting)     PTSD (post-traumatic stress disorder)     Right bundle branch block     Type 2 " diabetes mellitus     dx in     Urinary incontinence     s/p bladder stimulator    Wears contact lenses     Wears eyeglasses      Past Surgical History:   Procedure Laterality Date    BLADDER SURGERY      stimulator     SECTION      CRANIAL NEUROSTIMULATOR INSERTION/REPLACEMENT      battery replaced in     DIAGNOSTIC LAPAROSCOPY      ovarian cysts    ENDOSCOPY AND COLONOSCOPY      GASTRIC SLEEVE LAPAROSCOPIC N/A 2024    Procedure: GASTRIC SLEEVE LAPAROSCOPIC WITH DAVINCI ROBOT AND ESOPHAGOGASTRODUODENOSCOPY;  Surgeon: Kelsey Cerna MD;  Location: [facility];  Service: Robotics - DaVinci;  Laterality: N/A;    LAPAROSCOPIC CHOLECYSTECTOMY      gallstones    NASAL SINUS SURGERY      TEETH EXTRACTION       Outpatient Medications Marked as Taking for the 6/10/25 encounter (Office Visit) with Gaby Muñoz APRN   Medication Sig Dispense Refill    Acetaminophen 500 MG capsule Take 2 capsules by mouth Every 4 (Four) Hours As Needed for Moderate Pain or Fever.      albuterol sulfate  (90 Base) MCG/ACT inhaler Inhale 2 puffs Every 6 (Six) Hours As Needed.      buPROPion XL (WELLBUTRIN XL) 150 MG 24 hr tablet Take 1 tablet by mouth.      butalbital-acetaminophen-caffeine (FIORICET, ESGIC) -40 MG per tablet Take 1 tablet by mouth Every 4 (Four) Hours As Needed for Headache.      Cholecalciferol (Vitamin D3) 125 MCG (5000 UT) tablet dispersible Take 5,000 Units by mouth Daily.      Cyanocobalamin (Vitamin B-12) 1000 MCG sublingual tablet Place 1 tablet every day by sublingual route for 90 days.      cyclobenzaprine (FLEXERIL) 10 MG tablet Take 1 tablet by mouth 3 (Three) Times a Day As Needed.      fluticasone (FLONASE) 50 MCG/ACT nasal spray Administer 2 sprays into the nostril(s) as directed by provider Daily.      hydrOXYzine pamoate (VISTARIL) 25 MG capsule Take 1 capsule by mouth 3 (Three) Times a Day As Needed.      levETIRAcetam (KEPPRA) 750 MG tablet Take 1  "tablet by mouth 2 (Two) Times a Day.      Melatonin 5 MG capsule Take 1-2 each by mouth Every Night. 1-2 tabs as needed for sleep      methocarbamol (ROBAXIN) 500 MG tablet Take 1 tablet by mouth As Needed.      mometasone-formoterol (DULERA 100) 100-5 MCG/ACT inhaler Inhale 2 puffs 2 (Two) Times a Day.      omeprazole (priLOSEC) 40 MG capsule Take 1 capsule by mouth Daily. 60 capsule 0    ondansetron ODT (ZOFRAN-ODT) 4 MG disintegrating tablet Place 1 tablet on the tongue Every 4 (Four) Hours As Needed for Nausea or Vomiting. 10 tablet 0    prazosin (MINIPRESS) 5 MG capsule Take 1 capsule by mouth Every Night.      pregabalin (LYRICA) 50 MG capsule Take 1 capsule by mouth As Needed.       Allergies   Allergen Reactions    Minocycline Other (See Comments)     Renal failure    Rifampin Other (See Comments)     Renal failure    Sudafed [Pseudoephedrine Hcl] Other (See Comments)     Pt states it shuts her body down    Asa [Aspirin] Other (See Comments)     Unknown    Lactose Intolerance (Gi) GI Intolerance       Social History     Socioeconomic History    Marital status: Single   Tobacco Use    Smoking status: Never     Passive exposure: Never    Smokeless tobacco: Never   Vaping Use    Vaping status: Never Used   Substance and Sexual Activity    Alcohol use: Yes     Comment: once a year    Drug use: Never    Sexual activity: Yes     Partners: Male     Birth control/protection: None     Social History     Social History Narrative    Lives in [place], [place]  with 1 child and is currently disabled.        /78   Pulse 79   Temp 97.5 °F (36.4 °C) (Temporal)   Resp 17   Ht 157.5 cm (62\")   Wt 91.4 kg (201 lb 6.4 oz)   SpO2 98%   BMI 36.84 kg/m²     Physical Exam  Constitutional:       Appearance: She is well-developed.   Cardiovascular:      Rate and Rhythm: Normal rate.   Pulmonary:      Effort: Pulmonary effort is normal.   Musculoskeletal:         General: Normal range of motion.   Neurological:     "  Mental Status: She is alert.   Psychiatric:         Mood and Affect: Affect is tearful.         Thought Content: Thought content normal.         Judgment: Judgment normal.           Assessment:   11 months s/p robotic LSG 7/9/24 w/ Dr. Cerna    ICD-10-CM ICD-9-CM   1. Obesity, Class II, BMI 35-39.9  E66.812 278.00   2. Postgastrectomy malabsorption  K91.2 579.3    Z90.3    3. Fatigue, unspecified type  R53.83 780.79   4. Vitamin D deficiency  E55.9 268.9   5. Abnormal blood level of iron  R79.0 790.6   6. S/P bariatric surgery  Z98.84 V45.86         Class 2 Severe Obesity (BMI >=35 and <=39.9). Obesity-related health conditions include the following: see plan. Obesity is improving with treatment. BMI is is above average; BMI management plan is completed. We discussed see plan.        Plan:  Will refer to [facility]. Continue protein 100g/day. Continue < 100g carbs, 9803-2733 mason/day. Increase routine exercise/activity as able.  Bariatric labs ordered.  Continue vitamins w/ adjustments pending lab results.  Call w/ problems/concerns.    The patient was instructed to follow up in 6 months, sooner if needed.       OFE Sun      I spent 30 minutes caring for Merle on this date of service. This time includes time spent by me in the following activities: preparing for the visit, reviewing tests, obtaining and/or reviewing a separately obtained history, performing a medically appropriate examination and/or evaluation, counseling and educating the patient/family/caregiver, ordering medications, tests, or procedures, and documenting information in the medical record.

## 2025-06-15 LAB
25(OH)D3+25(OH)D2 SERPL-MCNC: 35.5 NG/ML (ref 30–100)
ALBUMIN SERPL-MCNC: 4.5 G/DL (ref 3.9–4.9)
ALP SERPL-CCNC: 100 IU/L (ref 44–121)
ALT SERPL-CCNC: 25 IU/L (ref 0–32)
AST SERPL-CCNC: 19 IU/L (ref 0–40)
BASOPHILS # BLD AUTO: 0 X10E3/UL (ref 0–0.2)
BASOPHILS NFR BLD AUTO: 1 %
BILIRUB SERPL-MCNC: 0.5 MG/DL (ref 0–1.2)
BUN SERPL-MCNC: 12 MG/DL (ref 6–24)
BUN/CREAT SERPL: 20 (ref 9–23)
CALCIUM SERPL-MCNC: 9.8 MG/DL (ref 8.7–10.2)
CHLORIDE SERPL-SCNC: 102 MMOL/L (ref 96–106)
CO2 SERPL-SCNC: 22 MMOL/L (ref 20–29)
CREAT SERPL-MCNC: 0.59 MG/DL (ref 0.57–1)
EGFRCR SERPLBLD CKD-EPI 2021: 115 ML/MIN/1.73
EOSINOPHIL # BLD AUTO: 0 X10E3/UL (ref 0–0.4)
EOSINOPHIL NFR BLD AUTO: 1 %
ERYTHROCYTE [DISTWIDTH] IN BLOOD BY AUTOMATED COUNT: 13.4 % (ref 11.7–15.4)
FERRITIN SERPL-MCNC: 121 NG/ML (ref 15–150)
FOLATE SERPL-MCNC: >20 NG/ML
GLOBULIN SER CALC-MCNC: 2.7 G/DL (ref 1.5–4.5)
GLUCOSE SERPL-MCNC: 92 MG/DL (ref 70–99)
HCT VFR BLD AUTO: 48.3 % (ref 34–46.6)
HGB BLD-MCNC: 15.2 G/DL (ref 11.1–15.9)
IMM GRANULOCYTES # BLD AUTO: 0 X10E3/UL (ref 0–0.1)
IMM GRANULOCYTES NFR BLD AUTO: 0 %
IRON SERPL-MCNC: 58 UG/DL (ref 27–159)
LYMPHOCYTES # BLD AUTO: 2.2 X10E3/UL (ref 0.7–3.1)
LYMPHOCYTES NFR BLD AUTO: 32 %
MCH RBC QN AUTO: 27.7 PG (ref 26.6–33)
MCHC RBC AUTO-ENTMCNC: 31.5 G/DL (ref 31.5–35.7)
MCV RBC AUTO: 88 FL (ref 79–97)
METHYLMALONATE SERPL-SCNC: 116 NMOL/L (ref 0–378)
MONOCYTES # BLD AUTO: 0.5 X10E3/UL (ref 0.1–0.9)
MONOCYTES NFR BLD AUTO: 7 %
NEUTROPHILS # BLD AUTO: 4.2 X10E3/UL (ref 1.4–7)
NEUTROPHILS NFR BLD AUTO: 59 %
PLATELET # BLD AUTO: 310 X10E3/UL (ref 150–450)
POTASSIUM SERPL-SCNC: 4.8 MMOL/L (ref 3.5–5.2)
PREALB SERPL-MCNC: 25 MG/DL (ref 12–34)
PROT SERPL-MCNC: 7.2 G/DL (ref 6–8.5)
RBC # BLD AUTO: 5.49 X10E6/UL (ref 3.77–5.28)
SODIUM SERPL-SCNC: 139 MMOL/L (ref 134–144)
VIT B1 BLD-SCNC: 132 NMOL/L (ref 66.5–200)
WBC # BLD AUTO: 7 X10E3/UL (ref 3.4–10.8)

## 2025-06-16 ENCOUNTER — RESULTS FOLLOW-UP (OUTPATIENT)
Dept: BARIATRICS/WEIGHT MGMT | Facility: CLINIC | Age: 45
End: 2025-06-16
Payer: MEDICAID

## 2025-07-27 PROBLEM — E66.9 OBESITY (BMI 30-39.9): Status: ACTIVE | Noted: 2025-07-27

## 2025-07-27 NOTE — PROGRESS NOTES
"  [facility]  [address]  [place] [address]     Office Note      Date: 2025  Patient Name: Merle Muir  MRN: [id number]  : 1980    --medicaid; BALAJI on cpap; osteoarthritis, fatty liver, GERD, HLD, T2DM    Subjective     History of Present Illness      Patient is {Satisfied/unsatisfied/unsure:13089} with weight loss progress. Appetite is {poor/mod/well:49721}. The patient {is / IS NOT:39337::\"is not\"} taking multivitamin and {is / IS NOT:38359::\"is not\"} taking fish oil.  The patient {is / IS NOT:25006::\"is not\"} using a food journal.  The patient rates current efforts as *** out of 10.    The patient is exercising with a FITT score of:    Frequency Intensity Time Strength Training   []   0, none []   0 []   0 []   0   []   1 (1-2x/week) []   1 (light) []   1 (<10 min) []   1 (1x/week)   []   2 (3-5x/week) []   2 (moderate) []   2 (10-20 min) []   2 (2x/week)   []   3 (daily) []   3 (moderately hard)  []   4 (very hard) []   3 (20-30 min)  []   4 (>30 min) []   3 (3-4x/week)     Review of Systems    Objective   Start weight: *** pounds.    Total Loss lb/%Loss of beginning body weight (BBW): ***lb/***%  Change in weight since last visit: ***    Recent Weight History:   Wt Readings from Last 6 Encounters:   06/10/25 91.4 kg (201 lb 6.4 oz)   03/10/25 92.4 kg (203 lb 12.8 oz)   24 91.6 kg (202 lb)   24 99.1 kg (218 lb 6.4 oz)   07/15/24 104 kg (229 lb 9.6 oz)   24 117 kg (257 lb 11.2 oz)         There is no height or weight on file to calculate BMI.   Body composition analysis completed and showed:        Measurements (in inches)       Vital Signs:   There were no vitals taken for this visit.      Physical Exam   Result Review :{Labs  Result Review  Imaging  Med Tab  Media :23}   {The following data was reviewed by (Optional):81028}  {Ambulatory Labs (Optional):08038}           Assessment / Plan     {CC Problem List  Visit " Diagnosis  ROS  Review (Popup)  [facility] Maintenance  Quality  BestPractice  Medications  SmartSets  SnapShot Encounters  Media :23}     Assessment & Plan      There are no diagnoses linked to this encounter.    We discussed the risks, benefits, and limitations of treatments. Continue medications and OTC supplements as discussed. Patient verbalizes understanding of and agreement with management plan.     Follow Up {Instructions Charge Capture  Follow-up Communications :23}  No follow-ups on file.  Patient was given instructions and counseling regarding her condition or for health maintenance advice. Please see specific information pulled into the AVS if appropriate.     I spent *** minutes on this date of service. This time includes time spent by me in the following activities:preparing for the visit, counseling and educating the patient/family/caregiver, ordering medications, tests, or procedures and documenting information in the medical record.    {JAYMIE [employer] Provider Statement:84769}    Divya Sen, APRN  07/28/2025

## 2025-07-28 ENCOUNTER — OFFICE VISIT (OUTPATIENT)
Dept: BARIATRICS/WEIGHT MGMT | Facility: CLINIC | Age: 45
End: 2025-07-28
Payer: MEDICAID

## 2025-07-28 VITALS
HEART RATE: 74 BPM | BODY MASS INDEX: 37.27 KG/M2 | WEIGHT: 197.4 LBS | SYSTOLIC BLOOD PRESSURE: 124 MMHG | HEIGHT: 61 IN | DIASTOLIC BLOOD PRESSURE: 72 MMHG

## 2025-07-28 DIAGNOSIS — E66.9 OBESITY (BMI 30-39.9): Primary | ICD-10-CM

## 2025-07-28 DIAGNOSIS — K76.0 FATTY LIVER: ICD-10-CM

## 2025-07-28 DIAGNOSIS — E55.9 VITAMIN D DEFICIENCY: ICD-10-CM

## 2025-07-28 DIAGNOSIS — E11.69 TYPE 2 DIABETES MELLITUS WITH OBESITY: ICD-10-CM

## 2025-07-28 DIAGNOSIS — E66.9 TYPE 2 DIABETES MELLITUS WITH OBESITY: ICD-10-CM

## 2025-07-28 PROCEDURE — 99417 PROLNG OP E/M EACH 15 MIN: CPT | Performed by: NURSE PRACTITIONER

## 2025-07-28 PROCEDURE — 99215 OFFICE O/P EST HI 40 MIN: CPT | Performed by: NURSE PRACTITIONER

## 2025-07-28 PROCEDURE — 1159F MED LIST DOCD IN RCRD: CPT | Performed by: NURSE PRACTITIONER

## 2025-07-28 PROCEDURE — 1160F RVW MEDS BY RX/DR IN RCRD: CPT | Performed by: NURSE PRACTITIONER

## 2025-07-28 RX ORDER — SEMAGLUTIDE 0.68 MG/ML
0.25 INJECTION, SOLUTION SUBCUTANEOUS WEEKLY
Qty: 2 ML | Refills: 1 | Status: SHIPPED | OUTPATIENT
Start: 2025-07-28 | End: 2025-08-27

## 2025-07-28 NOTE — PROGRESS NOTES
[facility]  [address]  [place] [address]   Office Note      Date: 2025  Patient Name: Merle Muir  MRN: [id number]  : 1980    Subjective     Chief Complaint  Weight Management New Patient            History of Present Illness  The patient presents for weight management.    She underwent bariatric surgery on 2024, which resulted in a weight loss of 49 pounds. However, she has not experienced any further weight loss since then, despite adhering to a diet and exercise regimen. She is currently on disability and does not work. She engages in mindful movement and exercises at least 5 times a week, including walking, bed exercises, morning exercises, and stationary biking. She maintains a regular exercise routine, even though she does not enjoy it. She consumes approximately 72 ounces of water daily, flavored with zero-calorie, zero-sugar flavoring or drinks. She has discontinued coffee consumption. She occasionally indulges in Familia's due to sugar cravings. She used to consume milk frequently but has stopped as it makes her feel unwell. She has eliminated soda from her diet. Her breakfast typically includes eggs and keto bread with butter, while her lunch often consists of tuna or salad with chicken or ham pieces. She eats out on  but tries to choose healthy options like chicken. She also consumes pork and beans. She is lactose intolerant and avoids yogurt but enjoys cottage cheese. She has a history of diabetes, which is now controlled post-surgery. She was previously on Ozempic for diabetes but discontinued it due to nausea and sickness. She also took insulin until her numbers improved post-surgery. She reports no side effects from Ozempic. She has a history of irritable bowel syndrome (IBS) and experiences loose stools most of the time, but her bowel movements are regular. She is considering discussing meal planning with her  therapist and . She weighs herself daily and aims to reach a weight of 140 pounds. She has a history of low vitamin D levels prior to surgery and was taking vitamin C and D supplements. She has sleep apnea and uses a CPAP machine. She has reflux and takes medication for it. She has arthritis, seizures, fibromyalgia, asthma, anxiety, PTSD, depression, chronic headaches, and back pain from fibromyalgia. She has a history of fatty liver disease diagnosed 8 years ago and elevated liver enzymes. She has a neurotransmitter device implanted for seizures and another device for bladder control. She reports no history of pancreatitis, thyroid cancers, or major endocrinology issues.    ALLERGIES  The patient is lactose intolerant.    MEDICATIONS  Current: Wellbutrin, vitamins, Prilosec, Robaxin.  Discontinued: Ozempic.    Highest lifetime weight: 280-290 pounds. Today's weight is 89.5 kg (197 lb 6.4 oz) pounds.   Weight 5 years ago: 256  The patient is exercising with a FITT score of:    Frequency   Intensity Time Strength Training   [x]   0 None  [x]   0 None  [x]   0 None  [x]   0 None    []   1 (1-2x/week) []   1 (light) []   1 (<10 min) []   1 (1x/week)   []   2 (3-5x/week) []   2 (moderate) []   2 (10-20 min) []   2 (2x/week)   []   3 (daily)   []   3 (moderately hard)  []   4 (very hard) []   3 (20-30 min)  []   4 (>30 min) []   3 (3-4x/week)         The following seem to sabotage weight loss efforts:eating too fast, always hungry, boredom eating, portion sizes, and poor sleep    Review of Systems   Constitutional:  Positive for fatigue.        Positive for weight gain   HENT:  Negative for trouble swallowing.         Negative for throat swelling   Respiratory:  Negative for shortness of breath and wheezing.         Negative for snoring   Cardiovascular:  Negative for chest pain, palpitations and leg swelling.   Gastrointestinal:  Negative for abdominal pain, constipation, diarrhea, GERD and indigestion.  "  Endocrine: Negative for cold intolerance, heat intolerance, polydipsia, polyphagia and polyuria.        Negative for loss of hair  Negative for hirsutism     Genitourinary:         Denies menstrual irregularities   Musculoskeletal:  Negative for arthralgias.        Denies exercise limitations  Denies chronic pain   Skin:  Negative for dry skin.        Negative for acne   Neurological:  Positive for headache. Negative for memory problem.        Negative for paresthesias   Psychiatric/Behavioral:  Positive for sleep disturbance and depressed mood. Negative for self-injury and suicidal ideas. The patient is nervous/anxious.    All other systems reviewed and are negative.      PHQ-9 Total Score:  16    Objective   Body mass index is 37.92 kg/m².  Body composition analysis completed and showed:   %body fat: 43.8    Measurements (in inches)  Neck: 16  Chest: 43  Waist: 42  Hips: 47  Thighs: 39    Vital Signs:   /72 (BP Location: Left arm, Patient Position: Sitting)   Pulse 74   Ht 153.7 cm (60.5\")   Wt 89.5 kg (197 lb 6.4 oz)   BMI 37.92 kg/m²       Physical Exam  Vitals reviewed.   Constitutional:       Appearance: She is obese. She is not ill-appearing.   Pulmonary:      Effort: Pulmonary effort is normal.   Neurological:      Mental Status: She is alert.   Psychiatric:         Attention and Perception: Attention and perception normal.         Behavior: Behavior is cooperative.          Result Review :     Common labs          12/9/2024    12:34 3/10/2025    14:56 6/10/2025    14:12   Common Labs   Glucose 83  88  92    BUN 12  14  12    Creatinine 0.67  0.67  0.59    Sodium 140  140  139    Potassium 3.8  5.0  4.8    Chloride 105  103  102    Calcium 9.2  10.0  9.8    Albumin 3.6  4.4  4.5    Total Bilirubin 0.2  0.3  0.5    Alkaline Phosphatase 114  131  100    AST (SGOT) 16  17  19    ALT (SGPT) 16  20  25    WBC 6.57  7.7  7.0    Hemoglobin 13.8  15.4  15.2    Hematocrit 41.0  46.2  48.3    Platelets 312 "  359  310                  Assessment / Plan           Diagnoses and all orders for this visit:    1. Obesity (BMI 30-39.9) (Primary)  Assessment & Plan:  Patient's (Body mass index is 37.92 kg/m².) indicates that they are obese (BMI >30) with health conditions that include obstructive sleep apnea, diabetes mellitus, GERD, osteoarthritis, and seizures . Weight is newly identified. BMI  is above average; BMI management plan is completed. We discussed low calorie, low carb based diet program, portion control, increasing exercise, and an maia-based approach such as [employer] Pal or Lose It.     -- As an initial visit patient was referred over by her bariatric team.  --The composition analysis was reviewed in short and long-term goals set up based on this information.  --Is currently using the [employer] food maia and encouraged to continue using this.  Her calories and macronutrients were adjusted based on her basal metabolic rate and ask her to bring into next office visit if she feels comfortable with this.  --Patient does have a comorbidity of type 2 diabetes.  Start Ozempic.  Message sent to staff for prior approval.  --Labs ordered and completed in office today.       Orders:  -     CBC & Differential  -     Comprehensive Metabolic Panel  -     Hemoglobin A1c  -     Insulin, Total  -     Lipid Panel  -     Urine Drug Screen - Urine, Clean Catch  -     Vitamin D,25-Hydroxy  -     TSH  -     Semaglutide,0.25 or 0.5MG/DOS, (Ozempic, 0.25 or 0.5 MG/DOSE,) 2 MG/3ML solution pen-injector; Inject 0.25 mg under the skin into the appropriate area as directed 1 (One) Time Per Week for 30 days.  Dispense: 2 mL; Refill: 1    2. Vitamin D deficiency  -     Vitamin D,25-Hydroxy    3. Type 2 diabetes mellitus with obesity  -     Comprehensive Metabolic Panel  -     Hemoglobin A1c  -     Semaglutide,0.25 or 0.5MG/DOS, (Ozempic, 0.25 or 0.5 MG/DOSE,) 2 MG/3ML solution pen-injector; Inject 0.25 mg under the skin into the appropriate  area as directed 1 (One) Time Per Week for 30 days.  Dispense: 2 mL; Refill: 1    4. Fatty liver         Topics of discussion included obesity as a disease, nutritional education on food groups, exercise, and medications. Patient was instructed in adequate protein, controlled carb and controlled fat intake. Patient received instructions on using the medicines as a tool in controlling their weight with nutritional and behavioral changes. Risks and benefits were discussed. I believe the potential benefits of medication helping to decrease weight outweighs the risks. Patient is to try nutritonal/behavioral changes only first. Patient received our clinic education booklet.   Our patient consent form was reviewed including potential risks of weight loss. We also reviewed our confidentiality and HIPPA statements. Patients current FITT score was reviewed along with current capability for exercise tolerance and a patient will work towards a FITT score of: Patient's past medical history was reviewed in detail and barriers to weight loss were identified and discussed. Past efforts at weight reduction on their own as well as under medical provider supervision were documented and discussed.  I advised patient to continue routine care with their Primary Care Provider. Nutritional recommendations and goals were reviewed based on body composition analysis including basal metabolic rate. Goal set for Calories,  adjusted for exercise calories burnt as well as Macronutrient. Take other medications and supplements as directed. Increase physical activity as tolerated without side effects.     I spent 60 minutes on this date of service. This time includes time spent by me in the following activities:preparing for the visit, counseling and educating the patient/family/caregiver, ordering medications, tests, or procedures and documenting information in the medical record.    Patient or patient representative verbalized consent for the use  of Ambient Listening during the visit with  OFE Carlisle for chart documentation. 7/28/2025  13:10 EDT    Follow Up   Return in about 2 weeks (around 8/11/2025).  Patient was given instructions and counseling regarding her condition or for health maintenance advice. Please see specific information pulled into the AVS if appropriate.     OFE Carlisle

## 2025-07-28 NOTE — PROGRESS NOTES
[facility]  [address]  [place] [address]   Office Note      Date: 2025  Patient Name: Merle Muir  MRN: [id number]  : 1980    Subjective     Chief Complaint  Weight Management New Patient     {Problem List  Visit Diagnosis   Encounters  Notes  Medications  Labs  Result Review Imaging  Media :23}       History of Present Illness                Highest lifetime weight: 280-290 pounds. Today's weight is   pounds.   Weight 5 years ago: 260-270lbs  The patient is exercising with a FITT score of:    Frequency   Intensity Time Strength Training   []   0 None  []   0 None  []   0 None  [x]   0 None    []   1 (1-2x/week) []   1 (light) []   1 (<10 min) []   1 (1x/week)   [x]   2 (3-5x/week) [x]   2 (moderate) []   2 (10-20 min) []   2 (2x/week)   []   3 (daily)   []   3 (moderately hard)  []   4 (very hard) []   3 (20-30 min)  [x]   4 (>30 min) []   3 (3-4x/week)         The following seem to sabotage weight loss efforts:eating too fast, always hungry, boredom eating, too little protein, and poor sleep    Review of Systems   Constitutional:  Positive for fatigue.        Positive for weight gain   HENT:  Negative for trouble swallowing.         Negative for throat swelling   Respiratory:  Negative for shortness of breath and wheezing.         Negative for snoring   Cardiovascular:  Negative for chest pain, palpitations and leg swelling.   Gastrointestinal:  Positive for diarrhea. Negative for abdominal pain, constipation, GERD and indigestion.   Endocrine: Negative for cold intolerance, heat intolerance, polydipsia, polyphagia and polyuria.        Negative for loss of hair  Negative for hirsutism     Genitourinary:         Denies menstrual irregularities   Musculoskeletal:  Negative for arthralgias.        Denies exercise limitations  Denies chronic pain   Skin:  Negative for dry skin.        Negative for acne   Neurological:  Positive for  headache. Negative for memory problem.        Negative for paresthesias   Psychiatric/Behavioral:  Positive for sleep disturbance and depressed mood. Negative for self-injury and suicidal ideas. The patient is nervous/anxious.    All other systems reviewed and are negative.    PHQ-9 Total Score:       Objective   There is no height or weight on file to calculate BMI.  Body composition analysis completed and showed:   %body fat: 43.8    Measurements (in inches)  Neck: 16  Chest: 43  Waist: 42  Hips: 47  Thighs: 39    Vital Signs:   There were no vitals taken for this visit.      Physical Exam       Result Review :{Labs  Result Review  Imaging  Med Tab  Media :23}   {The following data was reviewed by (Optional):46186}  {Ambulatory Labs (Optional):48909}  {Data reviewed (Optional):14442:::1}            Assessment / Plan    {CC Problem List  Visit Diagnosis  ROS  Review (Popup)  Health Maintenance  Quality  BestPractice  Medications  SmartSets  SnapShot Encounters  Media :23}     [unfilled]              There are no diagnoses linked to this encounter.     Topics of discussion included obesity as a disease, nutritional education on food groups, exercise, and medications. Patient was instructed in adequate protein, controlled carb and controlled fat intake. Patient received instructions on using the medicines as a tool in controlling their weight with nutritional and behavioral changes. Risks and benefits were discussed. I believe the potential benefits of medication helping to decrease weight outweighs the risks. Patient is to try nutritonal/behavioral changes only first. Patient received our clinic education booklet.   Our patient consent form was reviewed including potential risks of weight loss. We also reviewed our confidentiality and HIPPA statements. Patients current FITT score was reviewed along with current capability for exercise tolerance and a patient will work towards a FITT score of:  Patient's past medical history was reviewed in detail and barriers to weight loss were identified and discussed. Past efforts at weight reduction on their own as well as under medical provider supervision were documented and discussed.  I advised patient to continue routine care with their Primary Care Provider. Nutritional recommendations and goals were reviewed based on body composition analysis including basal metabolic rate. Goal set for Calories,  adjusted for exercise calories burnt as well as Macronutrient. Take other medications and supplements as directed. Increase physical activity as tolerated without side effects.     I spent *** minutes on this date of service. This time includes time spent by me in the following activities:preparing for the visit, counseling and educating the patient/family/caregiver, ordering medications, tests, or procedures and documenting information in the medical record.    Patient or patient representative verbalized consent for the use of Ambient Listening during the visit with  OFE Carlisle for chart documentation. 7/28/2025  11:39 EDT    Follow Up {Instructions Charge Capture  Follow-up Communications :23}  No follow-ups on file.  Patient was given instructions and counseling regarding her condition or for health maintenance advice. Please see specific information pulled into the AVS if appropriate.     OFE Carlisle

## 2025-07-28 NOTE — ASSESSMENT & PLAN NOTE
Patient's (Body mass index is 37.92 kg/m².) indicates that they are obese (BMI >30) with health conditions that include obstructive sleep apnea, diabetes mellitus, GERD, osteoarthritis, and seizures . Weight is newly identified. BMI  is above average; BMI management plan is completed. We discussed low calorie, low carb based diet program, portion control, increasing exercise, and an maia-based approach such as MyFitness Pal or Lose It.     -- As an initial visit patient was referred over by her bariatric team.  --The composition analysis was reviewed in short and long-term goals set up based on this information.  --Is currently using the [employer] food maia and encouraged to continue using this.  Her calories and macronutrients were adjusted based on her basal metabolic rate and ask her to bring into next office visit if she feels comfortable with this.  --Patient does have a comorbidity of type 2 diabetes.  Start Ozempic.  Message sent to staff for prior approval.  --Labs ordered and completed in office today.     Addendum 7/31/2025: ozempic approved

## 2025-07-30 ENCOUNTER — PRIOR AUTHORIZATION (OUTPATIENT)
Dept: BARIATRICS/WEIGHT MGMT | Facility: CLINIC | Age: 45
End: 2025-07-30
Payer: MEDICAID

## 2025-07-30 LAB
25(OH)D3+25(OH)D2 SERPL-MCNC: 32.3 NG/ML (ref 30–100)
ALBUMIN SERPL-MCNC: 4.6 G/DL (ref 3.9–4.9)
ALP SERPL-CCNC: 107 IU/L (ref 44–121)
ALT SERPL-CCNC: 25 IU/L (ref 0–32)
AMPHETAMINES UR QL SCN: NEGATIVE NG/ML
AST SERPL-CCNC: 24 IU/L (ref 0–40)
BARBITURATES UR QL SCN: NEGATIVE NG/ML
BASOPHILS # BLD AUTO: 0 X10E3/UL (ref 0–0.2)
BASOPHILS NFR BLD AUTO: 0 %
BENZODIAZ UR QL SCN: NEGATIVE NG/ML
BILIRUB SERPL-MCNC: 0.5 MG/DL (ref 0–1.2)
BUN SERPL-MCNC: 14 MG/DL (ref 6–24)
BUN/CREAT SERPL: 23 (ref 9–23)
BZE UR QL SCN: NEGATIVE NG/ML
CALCIUM SERPL-MCNC: 10.2 MG/DL (ref 8.7–10.2)
CANNABINOIDS UR QL SCN: NEGATIVE NG/ML
CHLORIDE SERPL-SCNC: 106 MMOL/L (ref 96–106)
CHOLEST SERPL-MCNC: 212 MG/DL (ref 100–199)
CO2 SERPL-SCNC: 20 MMOL/L (ref 20–29)
CREAT SERPL-MCNC: 0.62 MG/DL (ref 0.57–1)
CREAT UR-MCNC: 233.8 MG/DL (ref 20–300)
EGFRCR SERPLBLD CKD-EPI 2021: 113 ML/MIN/1.73
EOSINOPHIL # BLD AUTO: 0.1 X10E3/UL (ref 0–0.4)
EOSINOPHIL NFR BLD AUTO: 1 %
ERYTHROCYTE [DISTWIDTH] IN BLOOD BY AUTOMATED COUNT: 13.9 % (ref 11.7–15.4)
GLOBULIN SER CALC-MCNC: 3.2 G/DL (ref 1.5–4.5)
GLUCOSE SERPL-MCNC: 90 MG/DL (ref 70–99)
HBA1C MFR BLD: 4.9 % (ref 4.8–5.6)
HCT VFR BLD AUTO: 48.6 % (ref 34–46.6)
HDLC SERPL-MCNC: 57 MG/DL
HGB BLD-MCNC: 15.6 G/DL (ref 11.1–15.9)
IMM GRANULOCYTES # BLD AUTO: 0 X10E3/UL (ref 0–0.1)
IMM GRANULOCYTES NFR BLD AUTO: 0 %
INSULIN SERPL-ACNC: 13 UIU/ML (ref 2.6–24.9)
LABORATORY COMMENT REPORT: NORMAL
LDLC SERPL CALC-MCNC: 139 MG/DL (ref 0–99)
LYMPHOCYTES # BLD AUTO: 2.6 X10E3/UL (ref 0.7–3.1)
LYMPHOCYTES NFR BLD AUTO: 33 %
MCH RBC QN AUTO: 28.6 PG (ref 26.6–33)
MCHC RBC AUTO-ENTMCNC: 32.1 G/DL (ref 31.5–35.7)
MCV RBC AUTO: 89 FL (ref 79–97)
METHADONE UR QL SCN: NEGATIVE NG/ML
MONOCYTES # BLD AUTO: 0.5 X10E3/UL (ref 0.1–0.9)
MONOCYTES NFR BLD AUTO: 6 %
NEUTROPHILS # BLD AUTO: 4.7 X10E3/UL (ref 1.4–7)
NEUTROPHILS NFR BLD AUTO: 60 %
OPIATES UR QL SCN: NEGATIVE NG/ML
OXYCODONE+OXYMORPHONE UR QL SCN: NEGATIVE NG/ML
PCP UR QL: NEGATIVE NG/ML
PH UR: 5.2 [PH] (ref 4.5–8.9)
PLATELET # BLD AUTO: 315 X10E3/UL (ref 150–450)
POTASSIUM SERPL-SCNC: 4.7 MMOL/L (ref 3.5–5.2)
PROPOXYPH UR QL SCN: NEGATIVE NG/ML
PROT SERPL-MCNC: 7.8 G/DL (ref 6–8.5)
RBC # BLD AUTO: 5.45 X10E6/UL (ref 3.77–5.28)
SODIUM SERPL-SCNC: 141 MMOL/L (ref 134–144)
TRIGL SERPL-MCNC: 87 MG/DL (ref 0–149)
TSH SERPL DL<=0.005 MIU/L-ACNC: 1.14 UIU/ML (ref 0.45–4.5)
VLDLC SERPL CALC-MCNC: 16 MG/DL (ref 5–40)
WBC # BLD AUTO: 7.9 X10E3/UL (ref 3.4–10.8)

## 2025-07-30 NOTE — TELEPHONE ENCOUNTER
Merle Muir (Key: [id number])  Ozempic (0.25 or 0.5 MG/DOSE) 2MG/3ML pen-injectors  Form  [employer] [place] Medicaid ePA Form 2017 NCPDP        Information regarding your request  Member should be able to get the drug/product without a PA at this time.

## 2025-07-31 NOTE — TELEPHONE ENCOUNTER
PA for Ozempic. APPROVED    Message from Plan  Member should be able to get the drug/product without a PA at this time.

## 2025-08-11 ENCOUNTER — OFFICE VISIT (OUTPATIENT)
Dept: BARIATRICS/WEIGHT MGMT | Facility: CLINIC | Age: 45
End: 2025-08-11
Payer: MEDICAID

## 2025-08-11 VITALS
SYSTOLIC BLOOD PRESSURE: 122 MMHG | HEIGHT: 61 IN | BODY MASS INDEX: 35.98 KG/M2 | DIASTOLIC BLOOD PRESSURE: 76 MMHG | WEIGHT: 190.6 LBS | HEART RATE: 95 BPM

## 2025-08-11 DIAGNOSIS — E66.9 OBESITY (BMI 30-39.9): Primary | ICD-10-CM

## 2025-08-11 DIAGNOSIS — T88.7XXA SIDE EFFECT OF MEDICATION: ICD-10-CM

## 2025-08-11 DIAGNOSIS — E11.9 TYPE 2 DIABETES MELLITUS WITHOUT COMPLICATION, WITHOUT LONG-TERM CURRENT USE OF INSULIN: ICD-10-CM

## 2025-08-11 PROCEDURE — 99215 OFFICE O/P EST HI 40 MIN: CPT | Performed by: NURSE PRACTITIONER

## 2025-08-11 PROCEDURE — 1159F MED LIST DOCD IN RCRD: CPT | Performed by: NURSE PRACTITIONER

## 2025-08-11 PROCEDURE — 1160F RVW MEDS BY RX/DR IN RCRD: CPT | Performed by: NURSE PRACTITIONER

## 2025-08-11 RX ORDER — ONDANSETRON 8 MG/1
8 TABLET, FILM COATED ORAL EVERY 8 HOURS PRN
Qty: 30 TABLET | Refills: 1 | Status: SHIPPED | OUTPATIENT
Start: 2025-08-11 | End: 2025-09-10

## 2025-08-11 RX ORDER — SEMAGLUTIDE 0.68 MG/ML
0.5 INJECTION, SOLUTION SUBCUTANEOUS WEEKLY
Qty: 2 ML | Refills: 1 | Status: SHIPPED | OUTPATIENT
Start: 2025-08-11 | End: 2025-09-10

## (undated) DEVICE — ST TBG AIRSEAL BIF FLTR W/ACT/CHARCOAL/FLTR

## (undated) DEVICE — AIRSEAL 8 MM CANNULA CAP AND OBTURATOR WITH BLADELESS OPTICAL TIP COMPATIBLE WITH INTUITIVE DA VINCI XI AND DA VINCI X 8 MM INSTRUMENT CANNULA, LONG LENGTH: Brand: ARS LONG

## (undated) DEVICE — APL DUPLOSPRAYER MIS 40CM

## (undated) DEVICE — KT ORCA ORCAPOD DISP STRL

## (undated) DEVICE — ARM DRAPE

## (undated) DEVICE — UNDRPD COMFRT GLD DRYPAD 36X57IN

## (undated) DEVICE — BLADELESS OBTURATOR, LONG: Brand: WECK VISTA

## (undated) DEVICE — GLV SURG SENSICARE PI MIC PF SZ6.5 LF STRL

## (undated) DEVICE — BLANKT WARM UPPR/BDY ARM/OUT 57X196CM

## (undated) DEVICE — COLUMN DRAPE

## (undated) DEVICE — SEAL

## (undated) DEVICE — REDUCER: Brand: ENDOWRIST

## (undated) DEVICE — SEALANT WND FIBRIN TISSEEL PREFIL/SYR/PRIMAFZ 4ML

## (undated) DEVICE — GLV SURG DERMASSURE GRN LF PF 7.0

## (undated) DEVICE — THE DEVICE IS A DISPOSABLE, LIGATURE PASSING, SUTURING APPARATUS AND NEEDLE GUIDE FOR THE ABDOMINAL WALL WHICH IS NON-POWERED, HAND-HELD, AND HAND-MANIPULATED,INTENDED TO BE USED IN VARIOUS GENERAL SURGICAL PROCEDURES. THE DEVICE INCLUDES A LIGATURE CARRIER PATHWAY, NEEDLE GUIDE, TWO NEEDLES, REFERENCE PLANE T-BAR, AND A GUIDEWIRE. THE HANDLE OF THE DEVICE PROVIDES TWO DIAMETRICALLY OPPOSED ENCLOSED GUIDEWAYS FOR THE ADVANCEMENT AND RETRACTION OF THE NEEDLES UNDER MANUAL CONTROL OF A PLUNGER LOCATED AT THE PROXIMAL END OF THE DEVICE.AS PART OF THE M-CLOSE CONVENIENCE KIT, A NERVE BLOCK NEEDLE IS INCLUDED FOR THE ADMINISTRATION OF LOCAL ANESTHETIC AGENTS TO PROVIDE REGIONAL AND LOCAL ANESTHESIA.  A TELFA ANTIMICROBIAL, NON-ADHERENT PAD IS ALSO PROVIDED IN THE KIT FOR USE AS A PRIMARY DRESSING FOR THE SURGICAL INCISION.: Brand: M-CLOSE KIT

## (undated) DEVICE — PK BARIATRIC 10

## (undated) DEVICE — GOWN,NON-REINFORCED,SIRUS,SET IN SLV,XL: Brand: MEDLINE

## (undated) DEVICE — STAPLER 60: Brand: SUREFORM

## (undated) DEVICE — SHT AIR TRANSFR COMFRT GLIDE LAT 40X80IN

## (undated) DEVICE — Device

## (undated) DEVICE — VESSEL SEALER EXTEND: Brand: ENDOWRIST

## (undated) DEVICE — LAPAROVUE VISIBILITY SYSTEM LAPAROSCOPIC SOLUTIONS: Brand: LAPAROVUE